# Patient Record
Sex: FEMALE | Race: WHITE | NOT HISPANIC OR LATINO | Employment: UNEMPLOYED | URBAN - METROPOLITAN AREA
[De-identification: names, ages, dates, MRNs, and addresses within clinical notes are randomized per-mention and may not be internally consistent; named-entity substitution may affect disease eponyms.]

---

## 2017-01-29 ENCOUNTER — HOSPITAL ENCOUNTER (EMERGENCY)
Facility: HOSPITAL | Age: 22
Discharge: HOME | End: 2017-01-29
Payer: MEDICAID

## 2017-01-29 VITALS
RESPIRATION RATE: 18 BRPM | DIASTOLIC BLOOD PRESSURE: 74 MMHG | SYSTOLIC BLOOD PRESSURE: 116 MMHG | WEIGHT: 227 LBS | BODY MASS INDEX: 41.77 KG/M2 | TEMPERATURE: 98 F | OXYGEN SATURATION: 100 % | HEIGHT: 62 IN | HEART RATE: 80 BPM

## 2017-01-29 DIAGNOSIS — M06.9 FLARE OF RHEUMATOID ARTHRITIS: Primary | ICD-10-CM

## 2017-01-29 DIAGNOSIS — M25.562 ACUTE PAIN OF LEFT KNEE: ICD-10-CM

## 2017-01-29 PROCEDURE — 96372 THER/PROPH/DIAG INJ SC/IM: CPT

## 2017-01-29 PROCEDURE — 63600175 PHARM REV CODE 636 W HCPCS: Performed by: NURSE PRACTITIONER

## 2017-01-29 PROCEDURE — 99283 EMERGENCY DEPT VISIT LOW MDM: CPT | Mod: 25

## 2017-01-29 RX ORDER — KETOROLAC TROMETHAMINE 30 MG/ML
60 INJECTION, SOLUTION INTRAMUSCULAR; INTRAVENOUS
Status: COMPLETED | OUTPATIENT
Start: 2017-01-29 | End: 2017-01-29

## 2017-01-29 RX ORDER — METHYLPREDNISOLONE 4 MG/1
TABLET ORAL
Qty: 1 PACKAGE | Refills: 0 | Status: SHIPPED | OUTPATIENT
Start: 2017-01-29 | End: 2017-02-19

## 2017-01-29 RX ORDER — DICLOFENAC SODIUM 50 MG/1
50 TABLET, DELAYED RELEASE ORAL 2 TIMES DAILY
Qty: 20 TABLET | Refills: 0 | Status: SHIPPED | OUTPATIENT
Start: 2017-01-29 | End: 2017-07-11 | Stop reason: ALTCHOICE

## 2017-01-29 RX ADMIN — KETOROLAC TROMETHAMINE 60 MG: 60 INJECTION, SOLUTION INTRAMUSCULAR at 04:01

## 2017-01-29 NOTE — ED PROVIDER NOTES
SCRIBE #1 NOTE: I, Noah Meredith, am scribing for, and in the presence of, Marlo Mark NP. I have scribed the entire note.      History      Chief Complaint   Patient presents with    Rheumatoid Arthritis     pt has JRA and hasnt had any relief from medications for 3 weeks        Review of patient's allergies indicates:  No Known Allergies     HPI   HPI    1/29/2017, 4:20 PM   History obtained from the patient      History of Present Illness: Padmini Wren is a 21 y.o. female patient with a PMHx of Rheumatoid Arthritis who presents to the Emergency Department for bilateral knee pain, ongoing for three weeks. The pain is constant and moderate in severity, defined as resembling a usual flare-up. She reports that she does not have primary care at this time to treat her pain. She last received relief, via steroid injection, after presenting to [facility] last week. She has not been icing the site. Patient denies any weakness, numbness, fever, chills, CP, SOB, abdominal pain, N/V/D or any other sx at this time. No further complaints or concerns at this time.     Arrival mode: Personal vehicle    PCP: Primary Doctor No       Past Medical History:  No past medical history on file.    Past Surgical History:  No past surgical history on file.      Family History:  No family history on file.    Social History:  Social History     Social History Main Topics    Smoking status: Not on file    Smokeless tobacco: Not on file    Alcohol use Not on file    Drug use: Not on file    Sexual activity: Not on file       ROS   Review of Systems   Constitutional: Negative for chills and fever.   HENT: Negative for sore throat.    Respiratory: Negative for shortness of breath.    Cardiovascular: Negative for chest pain.   Gastrointestinal: Negative for diarrhea, nausea and vomiting.   Genitourinary: Negative for dysuria.   Musculoskeletal: Positive for arthralgias (knees, bilateral ). Negative for back pain.    Skin: Negative for rash.   Neurological: Negative for weakness and numbness.   Hematological: Does not bruise/bleed easily.       Physical Exam    Initial Vitals   BP Pulse Resp Temp SpO2   01/29/17 1450 01/29/17 1450 01/29/17 1450 01/29/17 1450 01/29/17 1450   117/65 78 16 98.1 °F (36.7 °C) 97 %      Physical Exam  Nursing Notes and Vital Signs Reviewed.  Constitutional: Patient is in no acute distress. Awake and alert. Well-developed and well-nourished.  Head: Atraumatic. Normocephalic.  Eyes: PERRL. EOM intact. Conjunctivae are not pale. No scleral icterus.  ENT: Mucous membranes are moist. Oropharynx is clear and symmetric.    Neck: Supple. Full ROM. No lymphadenopathy.  Cardiovascular: Regular rate. Regular rhythm. No murmurs, rubs, or gallops. Distal pulses are 2+ and symmetric.  Pulmonary/Chest: No respiratory distress. Clear to auscultation bilaterally. No wheezing, rales, or rhonchi.  Abdominal: Soft and non-distended.  There is no tenderness.  No rebound, guarding, or rigidity.  Good bowel sounds.    Genitourinary: No CVA tenderness  Musculoskeletal: Moves all extremities. No obvious deformities. No edema. No calf tenderness.  Right Knee:  No obvious deformity. No swelling or tenderness. No increased warmth, erythema, induration or fluctuance.  No ligament laxity. DP and PT pulses are 2+.  Normal capillary refill.  Distal sensation is intact.  Left Knee:  No obvious deformity. There is mild swelling with tenderness. No increased warmth, erythema, induration or fluctuance. DP and PT pulses are 2+.  Normal capillary refill.  Distal sensation is intact.  Skin: Warm and dry.  Neurological:  Alert, awake, and appropriate.  Normal speech.  No acute focal neurological deficits are appreciated.  Psychiatric: Normal affect. Good eye contact. Appropriate in content.    ED Course    Procedures  ED Vital Signs:  Vitals:    01/29/17 1450   BP: 117/65   Pulse: 78   Resp: 16   Temp: 98.1 °F (36.7 °C)   TempSrc: Oral  "  SpO2: 97%   Weight: 103 kg (227 lb)   Height: 5' 2" (1.575 m)            The Emergency Provider reviewed the vital signs and test results, which are outlined above.    ED Discussion     4:25 PM: Discussed with pt all pertinent ED information and results. Discussed pt dx and plan of tx. Gave pt all f/u and return to the ED instructions. All questions and concerns were addressed at this time. Pt expresses understanding of information and instructions, and is comfortable with plan to discharge. Pt is stable for discharge.    ED Medication(s):  Medications   ketorolac injection 60 mg (not administered)       New Prescriptions    DICLOFENAC (VOLTAREN) 50 MG EC TABLET    Take 1 tablet (50 mg total) by mouth 2 (two) times daily.    METHYLPREDNISOLONE (MEDROL DOSEPACK) 4 MG TABLET    As directed       Follow-up Information     Schedule an appointment as soon as possible for a visit with pcp or ER .            Medical Decision Making              Scribe Attestation:   Scribe #1: I performed the above scribed service and the documentation accurately describes the services I performed. I attest to the accuracy of the note.    Attending:   Physician Attestation Statement for Scribe #1: I, Marlo Mark NP , personally performed the services described in this documentation, as scribed by Noah Meredith, in my presence, and it is both accurate and complete.          Clinical Impression       ICD-10-CM ICD-9-CM   1. Flare of rheumatoid arthritis M06.9 714.0   2. Acute pain of left knee M25.562 719.46       Disposition:   Disposition: Discharged  Condition: Stable       Marlo Mark NP  01/31/17 0206    "

## 2017-01-29 NOTE — ED AVS SNAPSHOT
[facility]  [address] [facility] [address]               Padmini Wren   2017  3:50 PM   ED    Description:  Female : 1995   Department:  [facility]            Your Care was Coordinated By:     Provider Role From To    Marlo Mark NP Nurse Practitioner 17 [id number] --      Reason for Visit     Rheumatoid Arthritis           Diagnoses this Visit        Comments    Flare of rheumatoid arthritis    -  Primary     Acute pain of left knee           ED Disposition     None           To Do List           Follow-up Information     Schedule an appointment as soon as possible for a visit with pcp or ER .       These Medications        Disp Refills Start End    methylPREDNISolone (MEDROL DOSEPACK) 4 mg tablet 1 Package 0 2017    As directed    diclofenac (VOLTAREN) 50 MG EC tablet 20 tablet 0 2017     Take 1 tablet (50 mg total) by mouth 2 (two) times daily. - Oral      [facility]     [facility] -  Assistance  Registered nurses in the [facility] provide clinical advisement, health education, appointment booking, and other advisory services.  Call for this free service at [phone].             Medications           Message regarding Medications     Verify the changes and/or additions to your medication regime listed below are the same as discussed with your clinician today.  If any of these changes or additions are incorrect, please notify your healthcare provider.        START taking these NEW medications        Refills    methylPREDNISolone (MEDROL DOSEPACK) 4 mg tablet 0    Sig: As directed    Class: Print    diclofenac (VOLTAREN) 50 MG EC tablet 0    Sig: Take 1 tablet (50 mg total) by mouth 2 (two) times daily.    Class: Print    Route: Oral      These medications were administered today        Dose Freq    ketorolac injection 60 mg 60 mg ED 1 Time  "   Sig: Inject 2 mLs (60 mg total) into the muscle ED 1 Time.    Class: Normal    Route: Intramuscular    Non-formulary Exception Code: Defer to pharmacy           Verify that the below list of medications is an accurate representation of the medications you are currently taking.  If none reported, the list may be blank. If incorrect, please contact your healthcare provider. Carry this list with you in case of emergency.           Current Medications     diclofenac (VOLTAREN) 50 MG EC tablet Take 1 tablet (50 mg total) by mouth 2 (two) times daily.    ketorolac injection 60 mg Inject 2 mLs (60 mg total) into the muscle ED 1 Time.    methylPREDNISolone (MEDROL DOSEPACK) 4 mg tablet As directed           Clinical Reference Information           Your Vitals Were     BP Pulse Temp Resp Height Weight    117/65 (BP Location: Right arm, Patient Position: Sitting) 78 98.1 °F (36.7 °C) (Oral) 16 5' 2" (1.575 m) 103 kg (227 lb)    SpO2 BMI             97% 41.52 kg/m2         Allergies as of 1/29/2017     No Known Allergies      Immunizations Administered on Date of Encounter - 1/29/2017     None      ED Micro, Lab, POCT     None      ED Imaging Orders     None        Discharge Instructions         Living with Rheumatoid Arthritis  Rheumatoid arthritis (RA) is a chronic disease, but it doesn't have to keep you from being active. It is an autoimmune disease and your immune system attacks the lining of your joints. You can help control RA with exercise and a healthy lifestyle. Be sure to see your healthcare provider for scheduled checkups and lab work. At some point, you may be referred to a rheumatologist (a healthcare provider who specializes in arthritis and related diseases).    Make exercise part of your life  Gentle exercise can help lessen your pain. Keep the following in mind:  · Choose exercises that improve joint motion and make your muscles stronger. Your healthcare provider or a physical therapist may suggest a " few.  · Most people should exercise for at least 30 minutes a day on most days of the week. This can be broken up into shorter periods throughout the day.  · Try walking, riding a bike, or doing exercises in a warm pool. Look for programs in your community for people with arthritis.   · Dont push yourself too hard at first. Slowly build up over time.  · Make sure you warm up for 5 to 10 minutes each time you exercise. Stretching and flexibility exercises are often helpful.   · If pain and stiffness increase, don't exercise as hard or as long.  Watch your weight  If you weigh more than you should, your weight-bearing joints are under extra pressure. This makes your symptoms worse. To reduce pain and stiffness, try shedding a few of those extra pounds. The tips below may help:  · Start a weight-loss program with the help of your healthcare provider.  · Ask your friends and family for support.  · Join a weight-loss group.  Learn ways to cope  Most people with long-term conditions find it a challenge to deal with the emotions that often go along with their conditions. With rheumatoid arthritis, there is also pain.   · Work with your healthcare provider on ways to lessen pain. Medicines, use of heat and cold, and other methods are available.  · Learn to relax. Although it may not be easy, it does help lessen stress, anxiety, and pain. Simple deep-breathing exercises, meditation, and yoga are examples of relaxation techniques.  · Depression is common with long-term conditions. If you feel depressed, make sure you talk with your healthcare provider. Again, treatments, like medicine and counseling, are available.  Try to make your day easier    There are things you can do every day to protect your joints:  · Learn to balance rest with activity. Even on days when you have few symptoms, rest is still important.  · Ask friends and family members for help. Help with simple things can make a big difference for you. For example,  you might ask someone to change a light bulb, or take out your weekly garbage.  · Use assistive devices, which are special tools that reduce strain and protect joints. For example:  ¨ Long-handled reachers or grabbers for reaching high and low  ¨ Jar-openers, two-handled cups, and button --all of these devices help to protect your fingers, hands, and wrists  ¨ Large  for pencils, pens, kitchen and garden tools  The Arthritis Foundation has many additional suggestions about protecting your joints. Go to their website at http://www.arthritis.org.  Use mobility and other aids  People with arthritis and other problems affecting the joints often use mobility aids, to help with walking. For example, they may use canes or walkers. They may also use splints or braces to support joints. Talk with your healthcare provider or therapist about these aids. For instance, you might benefit from:  · Use a cane to ease knee or hip pain and help prevent falls  · Splints for your wrists or other joints  · A brace to support a weak knee joint  © 6595-2203 [employer]. [address] [place] [address]. All rights reserved. This information is not intended as a substitute for professional medical care. Always follow your healthcare professional's instructions.          Rheumatoid Arthritis  You have rheumatoid arthritis (RA). This is a chronic disease that mainly affects the joints. Sometimes, it also affects other parts of the body. RA is an autoimmune disease. This means that the bodys immune system, which normally protects the body, causes harm instead. With RA, the immune system attacks the joints. The reason for this is unknown.  In most cases, RA affects pairs of joints on both sides of the body. These can include joints in both elbows, wrists, hands, knees, feet, or ankles. The disease often starts slowly. Early symptoms include stiffness, muscle aches, weakness, and fatigue. Over time, the  joints may begin to hurt. They may also become warm, swollen, or tender. Symptoms may feel worse in the morning after a nights rest and may get better with activity.  With RA, you may have periods of active disease (when symptoms worsen) followed by periods of remission (when symptoms improve or go away). There is no known cure for RA. But medical treatment can slow or stop the progress of the disease. It can also help relieve symptoms. For advanced disease, surgery, such as joint replacement, may be the best option.  Home care  · If you were prescribed a medication, take it as directed.  · To help control swelling and pain, acetaminophen, ibuprofen, or another NSAID (non-steroidal anti-inflammatory drug) may be recommended. Note: If you have chronic liver or kidney disease or ever had a stomach ulcer or GI bleeding, tell your health care provider before taking any of these medications.  · Some persons find relief with heat (hot shower, hot bath, or heating pad). Others prefer cold (ice in a plastic bag, wrapped in a towel). Try both. Then use the method you like best. Use heat or cold for about 20 minutes, a few times a day.  · Exercise is a key part of treatment for RA. It helps reduce pain. It may also improve flexibility. Do your best to be active daily. Move your joints through their full range of motion each morning. Avoid staying in any one position for long periods of time. Take breaks throughout the day and move around. Also, ask your health care provider or physical therapist what exercises are best for you.  · If you are overweight, lose weight. Extra weight puts stress on your joints.  · If you smoke, quit. Smoking raises the risk of other problems linked to RA.  · No herbal product or nutritional supplement has been proven to help RA. But treatments such as acupuncture and massage may help relieve pain.  · Talk to your health care provider or occupational therapist about easier ways to perform daily  tasks. This may include the use of assistive devices. These are special tools that can help with things like dressing, bathing, cooking, driving, and moving or getting around.  Follow-up care  Follow up with your health care provider, or as advised.   When to seek medical advice  Call your health care provider right away if any of these occur:  · Increasing weakness, pale color of the skin, fainting  · Chest pain or shortness of breath  · Blood in vomit or stool (black or red color)  · Changes in vision  · Skin ulcers  · Fever of 101ºF (38.3ºC) or higher, or as directed by your health care provider  · New joint pain  · New rash  Resources  To learn more about RA, contact:  · Arthritis Foundation, 624.828.2003, www.arthritis.org  · National [place] of Arthritis and Musculoskeletal and Skin Diseases (NIAMS), 121.128.9763, www.niams.nih.gov     © 1277-9790 [employer]. [address] [place] [address], [place] [address]. All rights reserved. This information is not intended as a substitute for professional medical care. Always follow your healthcare professional's instructions.          [facility] Sign-Up     Activating your [facility] account is as easy as 1-2-3!     1) Visit [email or web address], select Sign Up Now, enter this activation code and your date of birth, then select Next.  [email or web address]  Expires: 3/15/2017  4:25 PM      2) Create a username and password to use when you visit [facility] in the future and select a security question in case you lose your password and select Next.    3) Enter your e-mail address and click Sign Up!    Additional Information  If you have questions, please e-mail [email or web address] or call [phone] to talk to our [facility] staff. Remember, [facility] is NOT to be used for urgent needs. For medical emergencies, dial 911.         Smoking Cessation     If you would like to quit smoking:   You may be eligible for free services if you are a [place] resident and started  smoking cigarettes before September 1, 1988.  Call the Smoking Cessation Trust (SCT) toll free at (834) 777-0422 or (445) 723-0651.   Call 1-800-QUIT-NOW if you do not meet the above criteria.             [facility] complies with applicable Federal civil rights laws and does not discriminate on the basis of race, color, national origin, age, disability, or sex.        Language Assistance Services     ATTENTION: Language assistance services are available, free of charge. Please call [phone].      ATENCIÓN: Si habla español, tiene a man disposición servicios gratuitos de asistencia lingüística. Llame al [phone].     CHÚ Ý: N?u b?n nói Ti?ng Vi?t, có các d?ch v? h? tr? ngôn ng? mi?n phí dành cho b?n. G?i s? [phone].

## 2017-01-29 NOTE — DISCHARGE INSTRUCTIONS
Living with Rheumatoid Arthritis  Rheumatoid arthritis (RA) is a chronic disease, but it doesn't have to keep you from being active. It is an autoimmune disease and your immune system attacks the lining of your joints. You can help control RA with exercise and a healthy lifestyle. Be sure to see your healthcare provider for scheduled checkups and lab work. At some point, you may be referred to a rheumatologist (a healthcare provider who specializes in arthritis and related diseases).    Make exercise part of your life  Gentle exercise can help lessen your pain. Keep the following in mind:  · Choose exercises that improve joint motion and make your muscles stronger. Your healthcare provider or a physical therapist may suggest a few.  · Most people should exercise for at least 30 minutes a day on most days of the week. This can be broken up into shorter periods throughout the day.  · Try walking, riding a bike, or doing exercises in a warm pool. Look for programs in your community for people with arthritis.   · Dont push yourself too hard at first. Slowly build up over time.  · Make sure you warm up for 5 to 10 minutes each time you exercise. Stretching and flexibility exercises are often helpful.   · If pain and stiffness increase, don't exercise as hard or as long.  Watch your weight  If you weigh more than you should, your weight-bearing joints are under extra pressure. This makes your symptoms worse. To reduce pain and stiffness, try shedding a few of those extra pounds. The tips below may help:  · Start a weight-loss program with the help of your healthcare provider.  · Ask your friends and family for support.  · Join a weight-loss group.  Learn ways to cope  Most people with long-term conditions find it a challenge to deal with the emotions that often go along with their conditions. With rheumatoid arthritis, there is also pain.   · Work with your healthcare provider on ways to lessen pain. Medicines, use of  heat and cold, and other methods are available.  · Learn to relax. Although it may not be easy, it does help lessen stress, anxiety, and pain. Simple deep-breathing exercises, meditation, and yoga are examples of relaxation techniques.  · Depression is common with long-term conditions. If you feel depressed, make sure you talk with your healthcare provider. Again, treatments, like medicine and counseling, are available.  Try to make your day easier    There are things you can do every day to protect your joints:  · Learn to balance rest with activity. Even on days when you have few symptoms, rest is still important.  · Ask friends and family members for help. Help with simple things can make a big difference for you. For example, you might ask someone to change a light bulb, or take out your weekly garbage.  · Use assistive devices, which are special tools that reduce strain and protect joints. For example:  ¨ Long-handled reachers or grabbers for reaching high and low  ¨ Jar-openers, two-handled cups, and button --all of these devices help to protect your fingers, hands, and wrists  ¨ Large  for pencils, pens, kitchen and garden tools  The Arthritis Foundation has many additional suggestions about protecting your joints. Go to their website at http://www.arthritis.org.  Use mobility and other aids  People with arthritis and other problems affecting the joints often use mobility aids, to help with walking. For example, they may use canes or walkers. They may also use splints or braces to support joints. Talk with your healthcare provider or therapist about these aids. For instance, you might benefit from:  · Use a cane to ease knee or hip pain and help prevent falls  · Splints for your wrists or other joints  · A brace to support a weak knee joint  © 1898-4029 [employer]. [address] [place] [address], [place] [address]. All rights reserved. This information is not intended as a substitute for  professional medical care. Always follow your healthcare professional's instructions.          Rheumatoid Arthritis  You have rheumatoid arthritis (RA). This is a chronic disease that mainly affects the joints. Sometimes, it also affects other parts of the body. RA is an autoimmune disease. This means that the bodys immune system, which normally protects the body, causes harm instead. With RA, the immune system attacks the joints. The reason for this is unknown.  In most cases, RA affects pairs of joints on both sides of the body. These can include joints in both elbows, wrists, hands, knees, feet, or ankles. The disease often starts slowly. Early symptoms include stiffness, muscle aches, weakness, and fatigue. Over time, the joints may begin to hurt. They may also become warm, swollen, or tender. Symptoms may feel worse in the morning after a nights rest and may get better with activity.  With RA, you may have periods of active disease (when symptoms worsen) followed by periods of remission (when symptoms improve or go away). There is no known cure for RA. But medical treatment can slow or stop the progress of the disease. It can also help relieve symptoms. For advanced disease, surgery, such as joint replacement, may be the best option.  Home care  · If you were prescribed a medication, take it as directed.  · To help control swelling and pain, acetaminophen, ibuprofen, or another NSAID (non-steroidal anti-inflammatory drug) may be recommended. Note: If you have chronic liver or kidney disease or ever had a stomach ulcer or GI bleeding, tell your health care provider before taking any of these medications.  · Some persons find relief with heat (hot shower, hot bath, or heating pad). Others prefer cold (ice in a plastic bag, wrapped in a towel). Try both. Then use the method you like best. Use heat or cold for about 20 minutes, a few times a day.  · Exercise is a key part of treatment for RA. It helps reduce  pain. It may also improve flexibility. Do your best to be active daily. Move your joints through their full range of motion each morning. Avoid staying in any one position for long periods of time. Take breaks throughout the day and move around. Also, ask your health care provider or physical therapist what exercises are best for you.  · If you are overweight, lose weight. Extra weight puts stress on your joints.  · If you smoke, quit. Smoking raises the risk of other problems linked to RA.  · No herbal product or nutritional supplement has been proven to help RA. But treatments such as acupuncture and massage may help relieve pain.  · Talk to your health care provider or occupational therapist about easier ways to perform daily tasks. This may include the use of assistive devices. These are special tools that can help with things like dressing, bathing, cooking, driving, and moving or getting around.  Follow-up care  Follow up with your health care provider, or as advised.   When to seek medical advice  Call your health care provider right away if any of these occur:  · Increasing weakness, pale color of the skin, fainting  · Chest pain or shortness of breath  · Blood in vomit or stool (black or red color)  · Changes in vision  · Skin ulcers  · Fever of 101ºF (38.3ºC) or higher, or as directed by your health care provider  · New joint pain  · New rash  Resources  To learn more about RA, contact:  · Arthritis Foundation, 582.200.8960, www.arthritis.org  · National [place] of Arthritis and Musculoskeletal and Skin Diseases (NIAMS), 405.468.1046, www.niams.nih.gov     © 9391-4679 [employer]. [address] [place] [address], [place] [address]. All rights reserved. This information is not intended as a substitute for professional medical care. Always follow your healthcare professional's instructions.

## 2017-02-23 ENCOUNTER — HOSPITAL ENCOUNTER (EMERGENCY)
Facility: HOSPITAL | Age: 22
Discharge: HOME | End: 2017-02-23
Payer: MEDICAID

## 2017-02-23 VITALS
WEIGHT: 220 LBS | TEMPERATURE: 98 F | BODY MASS INDEX: 40.48 KG/M2 | SYSTOLIC BLOOD PRESSURE: 118 MMHG | HEART RATE: 84 BPM | HEIGHT: 62 IN | OXYGEN SATURATION: 99 % | RESPIRATION RATE: 20 BRPM | DIASTOLIC BLOOD PRESSURE: 73 MMHG

## 2017-02-23 DIAGNOSIS — M06.9 RHEUMATOID ARTHRITIS INVOLVING BOTH KNEES, UNSPECIFIED RHEUMATOID FACTOR PRESENCE: Primary | ICD-10-CM

## 2017-02-23 DIAGNOSIS — M06.9 RHEUMATOID ARTHRITIS FLARE: ICD-10-CM

## 2017-02-23 PROCEDURE — 25000003 PHARM REV CODE 250: Performed by: PHYSICIAN ASSISTANT

## 2017-02-23 PROCEDURE — 99283 EMERGENCY DEPT VISIT LOW MDM: CPT | Mod: 25

## 2017-02-23 PROCEDURE — 96372 THER/PROPH/DIAG INJ SC/IM: CPT

## 2017-02-23 PROCEDURE — 63600175 PHARM REV CODE 636 W HCPCS: Performed by: PHYSICIAN ASSISTANT

## 2017-02-23 RX ORDER — HYDROCODONE BITARTRATE AND ACETAMINOPHEN 10; 325 MG/1; MG/1
1 TABLET ORAL EVERY 4 HOURS PRN
Qty: 15 TABLET | Refills: 0 | Status: SHIPPED | OUTPATIENT
Start: 2017-02-23 | End: 2017-07-11 | Stop reason: ALTCHOICE

## 2017-02-23 RX ORDER — DEXAMETHASONE SODIUM PHOSPHATE 4 MG/ML
4 INJECTION, SOLUTION INTRA-ARTICULAR; INTRALESIONAL; INTRAMUSCULAR; INTRAVENOUS; SOFT TISSUE
Status: DISCONTINUED | OUTPATIENT
Start: 2017-02-23 | End: 2017-02-23 | Stop reason: HOSPADM

## 2017-02-23 RX ORDER — HYDROCODONE BITARTRATE AND ACETAMINOPHEN 10; 325 MG/1; MG/1
1 TABLET ORAL
Status: COMPLETED | OUTPATIENT
Start: 2017-02-23 | End: 2017-02-23

## 2017-02-23 RX ORDER — NABUMETONE 500 MG/1
500 TABLET, FILM COATED ORAL 2 TIMES DAILY
Qty: 20 TABLET | Refills: 0 | Status: SHIPPED | OUTPATIENT
Start: 2017-02-23 | End: 2017-07-11 | Stop reason: ALTCHOICE

## 2017-02-23 RX ORDER — DEXAMETHASONE SODIUM PHOSPHATE 4 MG/ML
4 INJECTION, SOLUTION INTRA-ARTICULAR; INTRALESIONAL; INTRAMUSCULAR; INTRAVENOUS; SOFT TISSUE
Status: COMPLETED | OUTPATIENT
Start: 2017-02-23 | End: 2017-02-23

## 2017-02-23 RX ORDER — PREDNISONE 50 MG/1
50 TABLET ORAL DAILY
Qty: 5 TABLET | Refills: 0 | Status: SHIPPED | OUTPATIENT
Start: 2017-02-23 | End: 2017-02-28

## 2017-02-23 RX ADMIN — HYDROCODONE BITARTRATE AND ACETAMINOPHEN 1 TABLET: 10; 325 TABLET ORAL at 05:02

## 2017-02-23 RX ADMIN — DEXAMETHASONE SODIUM PHOSPHATE 4 MG: 4 INJECTION, SOLUTION INTRAMUSCULAR; INTRAVENOUS at 05:02

## 2017-02-23 NOTE — ED PROVIDER NOTES
SCRIBE #1 NOTE: I, Jose Alberto Mehta, am scribing for, and in the presence of, ELROY Carpenter. I have scribed the entire note.      History      Chief Complaint   Patient presents with    Arthritis     pt c/o arthritis flare up to bilat ankles, knees and hips for a while       Review of patient's allergies indicates:  No Known Allergies     HPI   HPI    2/23/2017, 5:11 PM   History obtained from the patient      History of Present Illness: Padmini Wren is a 21 y.o. female patient, with PMHx of rheumatoid arthritis, who presents to the Emergency Department for bilateral hip pain which onset gradually 1 week ago. Symptoms are constant and moderate in severity. Pt has been unable to get the proper medications due to an insurance problem. No mitigating or exacerbating factors reported. Associated sxs include gait problem, bilateral knee pain, bilateral ankle pain. Patient denies any back pain, neck pain, numbness, weakness, HA, lightheadedness, dizziness, n/v/d, and all other sxs at this time. No further complaints or concerns at this time.         Arrival mode: Personal vehicle     PCP: Taryn Salcido MD       Past Medical History:  Past medical history reviewed not relevant      Past Surgical History:  Past surgical history reviewed not relevant      Family History:  Family history reviewed not relevant      Social History:  Social History    Social History Main Topics    Social History Main Topics    Smoking status: Unknown if ever smoked    Smokeless tobacco: Unknown if ever used    Alcohol Use: Unknown drinking history    Drug Use: Unknown if ever used    Sexual Activity: Unknown         ROS   Review of Systems   Constitutional: Negative for fever.   HENT: Negative for sore throat.    Respiratory: Negative for shortness of breath.    Cardiovascular: Negative for chest pain.   Gastrointestinal: Negative for diarrhea, nausea and vomiting.   Genitourinary: Negative for dysuria.  "  Musculoskeletal: Positive for arthralgias (bilateral hip, knees, and ankles) and gait problem. Negative for back pain and neck pain.   Skin: Negative for rash.   Neurological: Negative for dizziness, weakness, light-headedness, numbness and headaches.   Hematological: Does not bruise/bleed easily.       Physical Exam    Initial Vitals   BP Pulse Resp Temp SpO2   02/23/17 1630 02/23/17 1630 02/23/17 1630 02/23/17 1630 02/23/17 1630   118/73 84 20 97.9 °F (36.6 °C) 99 %      Physical Exam  Nursing Notes and Vital Signs Reviewed.  Constitutional: Patient is in no acute distress. Awake and alert. Well-developed and well-nourished.  Head: Atraumatic. Normocephalic.  Eyes: PERRL. EOM intact. Conjunctivae are not pale. No scleral icterus.  ENT: Mucous membranes are moist. Oropharynx is clear and symmetric.    Neck: Supple. Full ROM. No lymphadenopathy.  Cardiovascular: Regular rate. Regular rhythm. No murmurs, rubs, or gallops. Distal pulses are 2+ and symmetric.  Pulmonary/Chest: No respiratory distress. Clear to auscultation bilaterally. No wheezing, rales, or rhonchi.  Abdominal: Soft and non-distended.  There is no tenderness.  No rebound, guarding, or rigidity.  Good bowel sounds.    Musculoskeletal: Moves all extremities. No obvious deformities. No edema. No calf tenderness.  Skin: Warm and dry.  Neurological:  Alert, awake, and appropriate.  Normal speech.  No acute focal neurological deficits are appreciated.  Psychiatric: Normal affect. Good eye contact. Appropriate in content.    ED Course    Procedures  ED Vital Signs:  Vitals:    02/23/17 1630   BP: 118/73   Pulse: 84   Resp: 20   Temp: 97.9 °F (36.6 °C)   TempSrc: Oral   SpO2: 99%   Weight: 99.8 kg (220 lb)   Height: 5' 2" (1.575 m)              The Emergency Provider reviewed the vital signs and test results, which are outlined above.    ED Discussion     5:26 PM: ELROY Roth explained to the pt the need to see a rheumatologist and the limitations " of the ED.    5:35 PM: Discussed pt dx and plan of tx. Gave pt all f/u and return to the ED instructions. All questions and concerns were addressed at this time. Pt expresses understanding of information and instructions, and is comfortable with plan to discharge. Pt is stable for discharge.        ED Medication(s):  Medications   dexamethasone injection 4 mg (not administered)   dexamethasone injection 4 mg (4 mg Intramuscular Given 2/23/17 1743)   hydrocodone-acetaminophen 10-325mg per tablet 1 tablet (1 tablet Oral Given 2/23/17 1742)       Discharge Medication List as of 2/23/2017  5:34 PM      START taking these medications    Details   hydrocodone-acetaminophen 10-325mg (NORCO)  mg Tab Take 1 tablet by mouth every 4 (four) hours as needed., Starting 2/23/2017, Until Discontinued, Print      nabumetone (RELAFEN) 500 MG tablet Take 1 tablet (500 mg total) by mouth 2 (two) times daily., Starting 2/23/2017, Until Discontinued, Print      predniSONE (DELTASONE) 50 MG Tab Take 1 tablet (50 mg total) by mouth once daily., Starting 2/23/2017, Until Tue 2/28/17, Print             Follow-up Information     Follow up with Taryn Salcido MD In 2 days.    Specialty:  Family Medicine    Why:  As needed, If symptoms worsen return to ED     Contact information:    [facility] [facility] [address]  [phone]              Medical Decision Making              Scribe Attestation:   Scribe #1: I performed the above scribed service and the documentation accurately describes the services I performed. I attest to the accuracy of the note.    Attending:   Physician Attestation Statement for Scribe #1: I, ELROY Carpenter, personally performed the services described in this documentation, as scribed by Jose Alberto Mehta, in my presence, and it is both accurate and complete.          Clinical Impression       ICD-10-CM ICD-9-CM   1. Rheumatoid arthritis involving both knees, unspecified rheumatoid factor presence M06.9  714.0   2. Rheumatoid arthritis flare M06.9 714.0       Disposition:   Disposition: Discharged  Condition: Stable           ELROY Baker  02/23/17 1815       ELROY Baker  02/23/17 1816

## 2017-02-23 NOTE — DISCHARGE INSTRUCTIONS
Living with Rheumatoid Arthritis    Rheumatoid arthritis (RA) is a chronic disease, but it doesn't have to keep you from being active. It is an autoimmune disease and your immune system attacks the lining of your joints. You can help control RA with exercise and a healthy lifestyle. Be sure to see your healthcare provider for scheduled checkups and lab work. At some point, you may be referred to a rheumatologist (a healthcare provider who specializes in arthritis and related diseases).  Make exercise part of your life  Gentle exercise can help lessen your pain. Keep the following in mind:  · Choose exercises that improve joint motion and make your muscles stronger. Your healthcare provider or a physical therapist may suggest a few.  · Most people should exercise for at least 30 minutes a day on most days of the week. This can be broken up into shorter periods throughout the day.  · Try walking, riding a bike, or doing exercises in a warm pool. Look for programs in your community for people with arthritis.   · Dont push yourself too hard at first. Slowly build up over time.  · Make sure you warm up for 5 to 10 minutes each time you exercise. Stretching and flexibility exercises are often helpful.   · If pain and stiffness increase, don't exercise as hard or as long.  Watch your weight  If you weigh more than you should, your weight-bearing joints are under extra pressure. This makes your symptoms worse. To reduce pain and stiffness, try shedding a few of those extra pounds. The tips below may help:  · Start a weight-loss program with the help of your healthcare provider.  · Ask your friends and family for support.  · Join a weight-loss group.  Learn ways to cope  Most people with long-term conditions find it a challenge to deal with the emotions that often go along with their conditions. With rheumatoid arthritis, there is also pain.   · Work with your healthcare provider on ways to lessen pain. Medicines, use of  heat and cold, and other methods are available.  · Learn to relax. Although it may not be easy, it does help lessen stress, anxiety, and pain. Simple deep-breathing exercises, meditation, and yoga are examples of relaxation techniques.  · Depression is common with long-term conditions. If you feel depressed, make sure you talk with your healthcare provider. Again, treatments, like medicine and counseling, are available.  Try to make your day easier  There are things you can do every day to protect your joints:  · Learn to balance rest with activity. Even on days when you have few symptoms, rest is still important.  · Ask friends and family members for help. Help with simple things can make a big difference for you. For example, you might ask someone to change a light bulb, or take out your weekly garbage.  · Use assistive devices, which are special tools that reduce strain and protect joints. For example:  ¨ Long-handled reachers or grabbers for reaching high and low  ¨ Jar-openers, two-handled cups, and button --all of these devices help to protect your fingers, hands, and wrists  ¨ Large  for pencils, pens, kitchen and garden tools  The Arthritis Foundation has many additional suggestions about protecting your joints. Go to their website at http://www.arthritis.org.  Use mobility and other aids  People with arthritis and other problems affecting the joints often use mobility aids, to help with walking. For example, they may use canes or walkers. They may also use splints or braces to support joints. Talk with your healthcare provider or therapist about these aids. For instance, you might benefit from:  · Use a cane to ease knee or hip pain and help prevent falls  · Splints for your wrists or other joints  · A brace to support a weak knee joint  Date Last Reviewed: 2/14/2016  © 6247-8875 [employer]. [address] [place] [address], [place] [address]. All rights reserved. This information is not  intended as a substitute for professional medical care. Always follow your healthcare professional's instructions.

## 2017-02-23 NOTE — ED AVS SNAPSHOT
[facility] -   [address] [address]  [place] [address]               Padmini Wren   2017  5:01 PM   ED    Description:  Female : 1995   Department:  [facility] -            Your Care was Coordinated By:     Provider Role From To    ELROY Baker Physician Assistant 17 1700 --      Reason for Visit     Arthritis           Diagnoses this Visit        Comments    Rheumatoid arthritis involving both knees, unspecified rheumatoid factor presence    -  Primary     Rheumatoid arthritis flare           ED Disposition     None           To Do List           Follow-up Information     Follow up with Taryn Salcido MD In 2 days.    Specialty:  Family Medicine    Why:  As needed, If symptoms worsen return to ED     Contact information:    [address] [facility] [address]  [phone]         These Medications        Disp Refills Start End    hydrocodone-acetaminophen 10-325mg (NORCO)  mg Tab 15 tablet 0 2017     Take 1 tablet by mouth every 4 (four) hours as needed. - Oral    predniSONE (DELTASONE) 50 MG Tab 5 tablet 0 2017    Take 1 tablet (50 mg total) by mouth once daily. - Oral    nabumetone (RELAFEN) 500 MG tablet 20 tablet 0 2017     Take 1 tablet (500 mg total) by mouth 2 (two) times daily. - Oral      [facility]     [facility] -  Assistance  Registered nurses in the [facility] provide clinical advisement, health education, appointment booking, and other advisory services.  Call for this free service at [phone].             Medications           Message regarding Medications     Verify the changes and/or additions to your medication regime listed below are the same as discussed with your clinician today.  If any of these changes or additions are incorrect, please notify your healthcare provider.        START taking these NEW  medications        Refills    hydrocodone-acetaminophen 10-325mg (NORCO)  mg Tab 0    Sig: Take 1 tablet by mouth every 4 (four) hours as needed.    Class: Print    Route: Oral    predniSONE (DELTASONE) 50 MG Tab 0    Sig: Take 1 tablet (50 mg total) by mouth once daily.    Class: Print    Route: Oral    nabumetone (RELAFEN) 500 MG tablet 0    Sig: Take 1 tablet (500 mg total) by mouth 2 (two) times daily.    Class: Print    Route: Oral      These medications were administered today        Dose Freq    dexamethasone injection 4 mg 4 mg ED 1 Time    Sig: Inject 1 mL (4 mg total) into the muscle ED 1 Time.    Class: Normal    Route: Intramuscular    Cosign for Ordering: Accepted by Nikolas Cortez MD on 2/23/2017  5:30 PM    hydrocodone-acetaminophen 10-325mg per tablet 1 tablet 1 tablet ED 1 Time    Sig: Take 1 tablet by mouth ED 1 Time.    Class: Normal    Route: Oral    Cosign for Ordering: Accepted by Nikolas Cortez MD on 2/23/2017  5:32 PM           Verify that the below list of medications is an accurate representation of the medications you are currently taking.  If none reported, the list may be blank. If incorrect, please contact your healthcare provider. Carry this list with you in case of emergency.           Current Medications     dexamethasone injection 4 mg Inject 1 mL (4 mg total) into the muscle ED 1 Time.    diclofenac (VOLTAREN) 50 MG EC tablet Take 1 tablet (50 mg total) by mouth 2 (two) times daily.    hydrocodone-acetaminophen 10-325mg (NORCO)  mg Tab Take 1 tablet by mouth every 4 (four) hours as needed.    hydrocodone-acetaminophen 10-325mg per tablet 1 tablet Take 1 tablet by mouth ED 1 Time.    nabumetone (RELAFEN) 500 MG tablet Take 1 tablet (500 mg total) by mouth 2 (two) times daily.    predniSONE (DELTASONE) 50 MG Tab Take 1 tablet (50 mg total) by mouth once daily.           Clinical Reference Information           Your Vitals Were     BP Pulse Temp Resp  "Height Weight    118/73 (BP Location: Right arm, Patient Position: Sitting) 84 97.9 °F (36.6 °C) (Oral) 20 5' 2" (1.575 m) 99.8 kg (220 lb)    SpO2 BMI             99% 40.24 kg/m2         Allergies as of 2/23/2017     No Known Allergies      Immunizations Administered on Date of Encounter - 2/23/2017     None      ED Micro, Lab, POCT     None      ED Imaging Orders     None        Discharge Instructions         Living with Rheumatoid Arthritis    Rheumatoid arthritis (RA) is a chronic disease, but it doesn't have to keep you from being active. It is an autoimmune disease and your immune system attacks the lining of your joints. You can help control RA with exercise and a healthy lifestyle. Be sure to see your healthcare provider for scheduled checkups and lab work. At some point, you may be referred to a rheumatologist (a healthcare provider who specializes in arthritis and related diseases).  Make exercise part of your life  Gentle exercise can help lessen your pain. Keep the following in mind:  · Choose exercises that improve joint motion and make your muscles stronger. Your healthcare provider or a physical therapist may suggest a few.  · Most people should exercise for at least 30 minutes a day on most days of the week. This can be broken up into shorter periods throughout the day.  · Try walking, riding a bike, or doing exercises in a warm pool. Look for programs in your community for people with arthritis.   · Dont push yourself too hard at first. Slowly build up over time.  · Make sure you warm up for 5 to 10 minutes each time you exercise. Stretching and flexibility exercises are often helpful.   · If pain and stiffness increase, don't exercise as hard or as long.  Watch your weight  If you weigh more than you should, your weight-bearing joints are under extra pressure. This makes your symptoms worse. To reduce pain and stiffness, try shedding a few of those extra pounds. The tips below may help:  · Start a " weight-loss program with the help of your healthcare provider.  · Ask your friends and family for support.  · Join a weight-loss group.  Learn ways to cope  Most people with long-term conditions find it a challenge to deal with the emotions that often go along with their conditions. With rheumatoid arthritis, there is also pain.   · Work with your healthcare provider on ways to lessen pain. Medicines, use of heat and cold, and other methods are available.  · Learn to relax. Although it may not be easy, it does help lessen stress, anxiety, and pain. Simple deep-breathing exercises, meditation, and yoga are examples of relaxation techniques.  · Depression is common with long-term conditions. If you feel depressed, make sure you talk with your healthcare provider. Again, treatments, like medicine and counseling, are available.  Try to make your day easier  There are things you can do every day to protect your joints:  · Learn to balance rest with activity. Even on days when you have few symptoms, rest is still important.  · Ask friends and family members for help. Help with simple things can make a big difference for you. For example, you might ask someone to change a light bulb, or take out your weekly garbage.  · Use assistive devices, which are special tools that reduce strain and protect joints. For example:  ¨ Long-handled reachers or grabbers for reaching high and low  ¨ Jar-openers, two-handled cups, and button --all of these devices help to protect your fingers, hands, and wrists  ¨ Large  for pencils, pens, kitchen and garden tools  The Arthritis Foundation has many additional suggestions about protecting your joints. Go to their website at http://www.arthritis.org.  Use mobility and other aids  People with arthritis and other problems affecting the joints often use mobility aids, to help with walking. For example, they may use canes or walkers. They may also use splints or braces to support  joints. Talk with your healthcare provider or therapist about these aids. For instance, you might benefit from:  · Use a cane to ease knee or hip pain and help prevent falls  · Splints for your wrists or other joints  · A brace to support a weak knee joint  Date Last Reviewed: 2/14/2016  © 6577-9447 [employer]. [address] [place] [address], [place] [address]. All rights reserved. This information is not intended as a substitute for professional medical care. Always follow your healthcare professional's instructions.          [facility] Sign-Up     Activating your [facility] account is as easy as 1-2-3!     1) Visit [email or web address], select Sign Up Now, enter this activation code and your date of birth, then select Next.  [email or web address]  Expires: 3/15/2017  4:25 PM      2) Create a username and password to use when you visit [facility] in the future and select a security question in case you lose your password and select Next.    3) Enter your e-mail address and click Sign Up!    Additional Information  If you have questions, please e-mail [email or web address] or call [phone] to talk to our [facility] staff. Remember, [facility] is NOT to be used for urgent needs. For medical emergencies, dial 911.         Smoking Cessation     If you would like to quit smoking:   You may be eligible for free services if you are a [place] resident and started smoking cigarettes before September 1, 1988.  Call the Smoking Cessation Trust (SCT) toll free at (796) 587-3317 or (937) 731-7359.   Call 5-997-QUIT-NOW if you do not meet the above criteria.             [facility] complies with applicable Federal civil rights laws and does not discriminate on the basis of race, color, national origin, age, disability, or sex.        Language Assistance Services     ATTENTION: Language assistance services are available, free of charge. Please call [phone].      ATENCIÓN: margret Denise  disposición servicios gratuitos de asistencia lingüística. Llluis enrique al [phone].     BRAXTON Ý: N?u b?n nói Ti?ng Vi?t, có các d?ch v? h? tr? ngôn ng? mi?n phí dành cho b?n. G?i s? [phone].

## 2017-03-02 ENCOUNTER — TELEPHONE (OUTPATIENT)
Dept: RHEUMATOLOGY | Facility: CLINIC | Age: 22
End: 2017-03-02

## 2017-03-02 ENCOUNTER — OFFICE VISIT (OUTPATIENT)
Dept: RHEUMATOLOGY | Facility: CLINIC | Age: 22
End: 2017-03-02
Payer: MEDICAID

## 2017-03-02 ENCOUNTER — LAB VISIT (OUTPATIENT)
Dept: LAB | Facility: HOSPITAL | Age: 22
End: 2017-03-02
Attending: INTERNAL MEDICINE
Payer: MEDICAID

## 2017-03-02 VITALS
WEIGHT: 232.81 LBS | HEART RATE: 106 BPM | SYSTOLIC BLOOD PRESSURE: 107 MMHG | BODY MASS INDEX: 42.58 KG/M2 | DIASTOLIC BLOOD PRESSURE: 73 MMHG

## 2017-03-02 DIAGNOSIS — M08.80 JIA (JUVENILE IDIOPATHIC ARTHRITIS): ICD-10-CM

## 2017-03-02 DIAGNOSIS — M08.80 JIA (JUVENILE IDIOPATHIC ARTHRITIS): Primary | ICD-10-CM

## 2017-03-02 DIAGNOSIS — M25.462 KNEE EFFUSION, LEFT: Chronic | ICD-10-CM

## 2017-03-02 LAB
ALBUMIN SERPL BCP-MCNC: 3 G/DL
ALP SERPL-CCNC: 69 U/L
ALT SERPL W/O P-5'-P-CCNC: 15 U/L
ANION GAP SERPL CALC-SCNC: 7 MMOL/L
APPEARANCE FLD: NORMAL
AST SERPL-CCNC: 12 U/L
BASOPHILS # BLD AUTO: 0.02 K/UL
BASOPHILS NFR BLD: 0.1 %
BILIRUB SERPL-MCNC: 0.2 MG/DL
BODY FLD TYPE: NORMAL
BUN SERPL-MCNC: 14 MG/DL
CALCIUM SERPL-MCNC: 8.8 MG/DL
CHLORIDE SERPL-SCNC: 106 MMOL/L
CO2 SERPL-SCNC: 28 MMOL/L
COLOR FLD: YELLOW
CREAT SERPL-MCNC: 0.8 MG/DL
CRP SERPL-MCNC: 22.4 MG/L
DIFFERENTIAL METHOD: ABNORMAL
EOSINOPHIL # BLD AUTO: 0.1 K/UL
EOSINOPHIL NFR BLD: 0.7 %
ERYTHROCYTE [DISTWIDTH] IN BLOOD BY AUTOMATED COUNT: 14 %
ERYTHROCYTE [SEDIMENTATION RATE] IN BLOOD BY WESTERGREN METHOD: 13 MM/HR
EST. GFR  (AFRICAN AMERICAN): >60 ML/MIN/1.73 M^2
EST. GFR  (NON AFRICAN AMERICAN): >60 ML/MIN/1.73 M^2
GLUCOSE SERPL-MCNC: 97 MG/DL
HCT VFR BLD AUTO: 42.6 %
HGB BLD-MCNC: 13.5 G/DL
LYMPHOCYTES # BLD AUTO: 5.8 K/UL
LYMPHOCYTES NFR BLD: 36 %
LYMPHOCYTES NFR FLD MANUAL: 17 %
MCH RBC QN AUTO: 29 PG
MCHC RBC AUTO-ENTMCNC: 31.7 %
MCV RBC AUTO: 92 FL
MONOCYTES # BLD AUTO: 0.8 K/UL
MONOCYTES NFR BLD: 5.2 %
MONOS+MACROS NFR FLD MANUAL: 7 %
NEUTROPHILS # BLD AUTO: 9.4 K/UL
NEUTROPHILS NFR BLD: 58 %
NEUTROPHILS NFR FLD MANUAL: 76 %
PLATELET # BLD AUTO: 333 K/UL
PMV BLD AUTO: 9.6 FL
POTASSIUM SERPL-SCNC: 4 MMOL/L
PROT SERPL-MCNC: 7 G/DL
RBC # BLD AUTO: 4.65 M/UL
SODIUM SERPL-SCNC: 141 MMOL/L
WBC # BLD AUTO: 16.21 K/UL
WBC # FLD: NORMAL /CU MM

## 2017-03-02 PROCEDURE — 20610 DRAIN/INJ JOINT/BURSA W/O US: CPT | Mod: PBBFAC,PO | Performed by: PHYSICIAN ASSISTANT

## 2017-03-02 PROCEDURE — 99213 OFFICE O/P EST LOW 20 MIN: CPT | Mod: PBBFAC,PO | Performed by: PHYSICIAN ASSISTANT

## 2017-03-02 PROCEDURE — 20610 DRAIN/INJ JOINT/BURSA W/O US: CPT | Mod: S$PBB,,, | Performed by: PHYSICIAN ASSISTANT

## 2017-03-02 PROCEDURE — 99999 PR PBB SHADOW E&M-EST. PATIENT-LVL III: CPT | Mod: PBBFAC,,, | Performed by: PHYSICIAN ASSISTANT

## 2017-03-02 PROCEDURE — 99214 OFFICE O/P EST MOD 30 MIN: CPT | Mod: 25,S$PBB,, | Performed by: PHYSICIAN ASSISTANT

## 2017-03-02 PROCEDURE — 89051 BODY FLUID CELL COUNT: CPT

## 2017-03-02 RX ORDER — BETAMETHASONE SODIUM PHOSPHATE AND BETAMETHASONE ACETATE 3; 3 MG/ML; MG/ML
3 INJECTION, SUSPENSION INTRA-ARTICULAR; INTRALESIONAL; INTRAMUSCULAR; SOFT TISSUE
Status: COMPLETED | OUTPATIENT
Start: 2017-03-02 | End: 2017-03-03

## 2017-03-02 RX ORDER — METHYLPREDNISOLONE ACETATE 80 MG/ML
80 INJECTION, SUSPENSION INTRA-ARTICULAR; INTRALESIONAL; INTRAMUSCULAR; SOFT TISSUE
Status: COMPLETED | OUTPATIENT
Start: 2017-03-02 | End: 2017-03-03

## 2017-03-02 RX ORDER — FOLIC ACID 1 MG/1
1 TABLET ORAL DAILY
Qty: 90 TABLET | Refills: 2 | Status: SHIPPED | OUTPATIENT
Start: 2017-03-02 | End: 2017-07-11 | Stop reason: ALTCHOICE

## 2017-03-02 RX ORDER — METHOTREXATE 2.5 MG/1
TABLET ORAL
Qty: 30 TABLET | Refills: 11 | Status: SHIPPED | OUTPATIENT
Start: 2017-03-02 | End: 2017-07-11 | Stop reason: ALTCHOICE

## 2017-03-02 ASSESSMENT — ROUTINE ASSESSMENT OF PATIENT INDEX DATA (RAPID3): MDHAQ FUNCTION SCORE: 1.7

## 2017-03-02 NOTE — MR AVS SNAPSHOT
[facility]  [address] [facility] [address] [facility] [address] [facility] [place] [address]  Phone: [phone]  Fax: [phone]                  Padmini Jones   3/2/2017 2:00 PM   Office Visit    Description:  Female : 1995   Provider:  Daphne Armstrong PA-C   Department:  [facility] - Rheumatology           Reason for Visit     Rheumatoid Arthritis           Diagnoses this Visit        Comments    FELIBERTO (juvenile idiopathic arthritis)    -  Primary     Knee effusion, left         BMI 40.0-44.9, adult                To Do List           Future Appointments        Provider Department Dept Phone    3/2/2017 3:35 PM LABORATORY, [facility] [phone]    7/10/2017 9:00 AM Daphne Armstrong PA-C [facility] [phone]    7/10/2017 10:00 AM LABORATORY, [facility] [phone]      Goals (5 Years of Data)     None      Follow-Up and Disposition     Return in about 4 months (around 2017) for reg 4 labs.       These Medications        Disp Refills Start End    methotrexate 2.5 MG Tab 30 tablet 11 3/2/2017     Take 4 (10mg) tablets once a week for two weeks, then increase to 6 (15mg) tablets once a week    Pharmacy: [facility] [address] [place] [address] [address] [facility] [address] Ph #: [phone]       folic acid (FOLVITE) 1 MG tablet 90 tablet 2 3/2/2017 3/2/2018    Take 1 tablet (1 mg total) by mouth once daily. - Oral    Pharmacy: [facility] [address] [facility] [address] AT [facility] OF [employer] [email or web address] Ph #: [phone]         [facility]     [facility] Care [facility] -  Assistance  Registered nurses in the [facility] provide clinical advisement, health education, appointment booking, and other advisory services.  Call for this free service at [phone].             Medications           Message regarding Medications     Verify the changes and/or additions to your  medication regime listed below are the same as discussed with your clinician today.  If any of these changes or additions are incorrect, please notify your healthcare provider.        START taking these NEW medications        Refills    methotrexate 2.5 MG Tab 11    Sig: Take 4 (10mg) tablets once a week for two weeks, then increase to 6 (15mg) tablets once a week    Class: Normal    folic acid (FOLVITE) 1 MG tablet 2    Sig: Take 1 tablet (1 mg total) by mouth once daily.    Class: Normal    Route: Oral      These medications were administered today        Dose Freq    betamethasone acetate-betamethasone sodium phosphate injection 3 mg 3 mg Clinic/HOD 1 time    Sig: Inject 0.5 mLs (3 mg total) into the articular space one time.    Class: Normal    Route: Intra-articular    methylPREDNISolone acetate injection 80 mg 80 mg Clinic/HOD 1 time    Sig: Inject 1 mL (80 mg total) into the articular space one time.    Class: Normal    Route: Intra-articular           Verify that the below list of medications is an accurate representation of the medications you are currently taking.  If none reported, the list may be blank. If incorrect, please contact your healthcare provider. Carry this list with you in case of emergency.           Current Medications     etonogestrel (NEXPLANON) 68 mg Impl by Subdermal route.    naproxen sodium (ALEVE) 220 mg Cap Take by mouth. 2 pills twice daily    folic acid (FOLVITE) 1 MG tablet Take 1 tablet (1 mg total) by mouth once daily.    methotrexate 2.5 MG Tab Take 4 (10mg) tablets once a week for two weeks, then increase to 6 (15mg) tablets once a week           Clinical Reference Information           Your Vitals Were     BP                   107/73           Blood Pressure          Most Recent Value    BP  107/73      Allergies as of 3/2/2017     No Known Allergies      Immunizations Administered on Date of Encounter - 3/2/2017     None      Orders Placed During Today's Visit      Normal  Orders This Visit    WBC & Diff,Body Fluid Joint Fluid, Left Knee     Future Labs/Procedures Expected by Expires    KENDRICK  3/2/2017 5/1/2018    Cyclic citrul peptide antibody, IgG  3/2/2017 5/1/2018    Hepatitis B surface antigen  3/2/2017 5/1/2018    Hepatitis C antibody  3/2/2017 5/1/2018    Rheumatoid factor  3/2/2017 5/1/2018    WBC & Diff,Body Fluid Joint Fluid, Left Knee  3/2/2017 5/1/2018      [facility] Sign-Up     Activating your [facility] account is as easy as 1-2-3!     1) Visit [email or web address], select Sign Up Now, enter this activation code and your date of birth, then select Next.  [email or web address]  Expires: 4/16/2017  3:29 PM      2) Create a username and password to use when you visit [facility] in the future and select a security question in case you lose your password and select Next.    3) Enter your e-mail address and click Sign Up!    Additional Information  If you have questions, please e-mail [email or web address] or call [phone] to talk to our [facility] staff. Remember, [facility] is NOT to be used for urgent needs. For medical emergencies, dial 911.         Instructions    Birth control!!!!    Repeat some labs today    Will start methotrexate.     MTX 2.5mg  -start 4 tablets once a week x 2 weeks (10mg)  -then increase to 6 tablets once a week x 2wks (15mg)  -folic acid daily 1 mg    Skip mtx dose for infections or surgery, call office with any questions               Language Assistance Services     ATTENTION: Language assistance services are available, free of charge. Please call [phone].      ATENCIÓN: Si habla diaz, tiene a man disposición servicios gratuitos de asistencia lingüística. Llame al [phone].     CHÚ Ý: N?u b?n nói Ti?ng Vi?t, có các d?ch v? h? tr? ngôn ng? mi?n phí dành cho b?n. G?i s? [phone].         [facility] complies with applicable Federal civil rights laws and does not discriminate on the basis of race, color, national origin, age,  disability, or sex.

## 2017-03-02 NOTE — PROGRESS NOTES
Subjective:       Patient ID: Padmini Jones is a 21 y.o. female.    Chief Complaint: Rheumatoid Arthritis    HPI Comments: Padmini is a 20y/o female with a history of juvenile idiopathic arthritis.  She has not been since since April of 2014 and saw Franchesca at that time, and prior to that time it sounds like she hadn't been seen in a few years.  She states in the past she has been on Enbrel which helped her joints; she is unsure of whether she has taken methotrexate in the past but it sounds familiar to her.  She has not been on any medications for a very long time and is having issues with her joints.  She c/o gregorio knee pain and swelling, left hip pain, and left ankle pain.  She has been going to the ER to get steroid shots for her pain but these have stopped working.  She complains of 7/10 pain today.  She denies pain in her wrists or hands.  No real morning stiffness.  She denies fever, fatigue, rash, hair loss, weight loss, chest pain, SOB, and hematuria.  She denies any symptoms of Raynauds.     Review of Systems   Constitutional: Negative for chills, fatigue and fever.   HENT: Negative for mouth sores, rhinorrhea and sore throat.    Eyes: Negative for pain and redness.   Respiratory: Negative for cough and shortness of breath.    Cardiovascular: Negative for chest pain.   Gastrointestinal: Negative for abdominal pain, constipation, diarrhea, nausea and vomiting.        Reflux   Genitourinary: Negative for dysuria and hematuria.   Musculoskeletal: Positive for arthralgias and joint swelling. Negative for myalgias.   Skin: Negative for color change and rash.   Neurological: Negative for weakness, numbness and headaches.   Psychiatric/Behavioral: The patient is not nervous/anxious.          Objective:   /73  Pulse 106  Wt 105.6 kg (232 lb 12.9 oz)  BMI 42.58 kg/m2     Physical Exam       Right Side Rheumatological Exam     She has swelling of the knee    Left Side Rheumatological Exam     The  patient is tender to palpation of the knee.    She has swelling of the knee      Musculoskeletal:   gregorio wrists, mcps, pips no synovitis, no tenderness, full rom  gregorio elbows and shoulder no swelling, no tenderness, full rom    gregorio hips no pain with int/ext rotation    Right knee small effusion, nontender, good rom  Left knee moderate effusion, +warmth, no erythema, decreased rom due to pain    gregorio ankles no swelling or tenderness, scar the the medial right ankle from old surgery         Recent Results (from the past 168 hour(s))   CBC auto differential    Collection Time: 03/02/17  2:21 PM   Result Value Ref Range    WBC 16.21 (H) 3.90 - 12.70 K/uL    RBC 4.65 4.00 - 5.40 M/uL    Hemoglobin 13.5 12.0 - 16.0 g/dL    Hematocrit 42.6 37.0 - 48.5 %    MCV 92 82 - 98 fL    MCH 29.0 27.0 - 31.0 pg    MCHC 31.7 (L) 32.0 - 36.0 %    RDW 14.0 11.5 - 14.5 %    Platelets 333 150 - 350 K/uL    MPV 9.6 9.2 - 12.9 fL    Gran # 9.4 (H) 1.8 - 7.7 K/uL    Lymph # 5.8 (H) 1.0 - 4.8 K/uL    Mono # 0.8 0.3 - 1.0 K/uL    Eos # 0.1 0.0 - 0.5 K/uL    Baso # 0.02 0.00 - 0.20 K/uL    Gran% 58.0 38.0 - 73.0 %    Lymph% 36.0 18.0 - 48.0 %    Mono% 5.2 4.0 - 15.0 %    Eosinophil% 0.7 0.0 - 8.0 %    Basophil% 0.1 0.0 - 1.9 %    Differential Method Automated    Comprehensive metabolic panel    Collection Time: 03/02/17  2:21 PM   Result Value Ref Range    Sodium 141 136 - 145 mmol/L    Potassium 4.0 3.5 - 5.1 mmol/L    Chloride 106 95 - 110 mmol/L    CO2 28 23 - 29 mmol/L    Glucose 97 70 - 110 mg/dL    BUN, Bld 14 6 - 20 mg/dL    Creatinine 0.8 0.5 - 1.4 mg/dL    Calcium 8.8 8.7 - 10.5 mg/dL    Total Protein 7.0 6.0 - 8.4 g/dL    Albumin 3.0 (L) 3.5 - 5.2 g/dL    Total Bilirubin 0.2 0.1 - 1.0 mg/dL    Alkaline Phosphatase 69 55 - 135 U/L    AST 12 10 - 40 U/L    ALT 15 10 - 44 U/L    Anion Gap 7 (L) 8 - 16 mmol/L    eGFR if African American >60 >60 mL/min/1.73 m^2    eGFR if non African American >60 >60 mL/min/1.73 m^2   C-reactive protein     Collection Time: 03/02/17  2:21 PM   Result Value Ref Range    CRP 22.4 (H) 0.0 - 8.2 mg/L   Sedimentation rate, manual    Collection Time: 03/02/17  2:21 PM   Result Value Ref Range    Sed Rate 13 0 - 20 mm/Hr   WBC & Diff,Body Fluid Joint Fluid, Left Knee    Collection Time: 03/02/17  3:10 PM   Result Value Ref Range    Body Fluid Type Joint Fluid, Left Knee     Fluid Appearance Cloudy     Fluid Color Yellow     WBC, Body Fluid 02975 /cu mm    Segs, Fluid 76 %    Lymphs, Fluid 17 %    Monocytes/Macrophages, Fluid 7 %   Rheumatoid factor    Collection Time: 03/02/17  3:40 PM   Result Value Ref Range    Rheumatoid Factor <10.0 0.0 - 15.0 IU/mL   Cyclic citrul peptide antibody, IgG    Collection Time: 03/02/17  3:40 PM   Result Value Ref Range    CCP Antibodies <0.5 <5.0 U/mL          Assessment:       1. FELIBERTO (juvenile idiopathic arthritis)    2. Knee effusion, left    3. BMI 40.0-44.9, adult        1.  Left knee effusion: has gone to the ER to get steroid shots several times, these dont really work; fluid found to be inflammatory after aspiration and microscope eval, see below  2.  FELIBERTO oligoarticular - Active disease, gregorio knee effusions L>R, h/o enbrel which seemed to help, unclear if has taken mtx in the past; cdai 12, mhaq 1.7  3.  BMI 42, obesity    Plan:       -Aspirate and inject left knee as below  -Will start mtx, could add enbrel vs humira in the future if needed  -Will get RF, CCP, KENDRICK, hepatits B and C   -MTX 2.5mg    -start 4 tablets once a week x 2 weeks (10mg)    -then increase to 6 tablets once a week x 2wks (15mg)    -folic acid daily 1 mg    Skip mtx dose for infections or surgery, call office with any questions  -Birth control measures discussed  -counseled patient on weight loss measures -encouraged patient to make better dietary decisions and  incorporate some regular exercise into their lifestyle    Procedure note: After verbal consent was obtained.  The left knee was prepared with sterile prep.   The skin was anesthetized with 1% ethyl chloride.  The knee joint was injected with 3 cc of 1% lidocaine to anesthetize the knee.  The joint was then aspirated with an 18 gauge needle.  40ccs of cloudy yellow fluid was obtained and send to lab for cell count.  The joint was then injected with 3mg celestone/soluspan, 80 mg Depomedrol and 1ml of 1% lidocaine.  Hemostasis was obtained.  The patient tolerated procedure well with no complications.  discharge and icing instructions given to patient. The fluid was examined under microscope and was found to be Inflammatory fluid, 10-20WBCper hpf, fair viscocity.no crystals seen.     Case presented to Dr Celestin. He was present in the exam room. PE done independently. Impression and Plan done in collaboration  Hx and exam reviewed  with Mrs Armstrong, helped develop  Imp/Plan as above and discussed same with Diff. Dx considered. I  Reviewed synovial fluid- fair viscosity, 10-20 wbc/hpf; 5-10 rbc/hpf- ,no crystals= inflammatory fluid consistent with active FELIBERTO DARRION Celesitn MD

## 2017-03-02 NOTE — PATIENT INSTRUCTIONS
Birth control!!!!    Repeat some labs today    Will start methotrexate.     MTX 2.5mg  -start 4 tablets once a week x 2 weeks (10mg)  -then increase to 6 tablets once a week x 2wks (15mg)  -folic acid daily 1 mg    Skip mtx dose for infections or surgery, call office with any questions

## 2017-03-03 RX ADMIN — BETAMETHASONE SODIUM PHOSPHATE AND BETAMETHASONE ACETATE 3 MG: 3; 3 INJECTION, SUSPENSION INTRA-ARTICULAR; INTRALESIONAL; INTRAMUSCULAR at 11:03

## 2017-03-03 RX ADMIN — METHYLPREDNISOLONE ACETATE 80 MG: 80 INJECTION, SUSPENSION INTRALESIONAL; INTRAMUSCULAR; INTRASYNOVIAL; SOFT TISSUE at 11:03

## 2017-03-03 ASSESSMENT — CLINICAL DISEASE ACTIVITY INDEX (CDAI)
PATIENT_ASSESSMENT: 7
TENDER_JOINTS_COUNT: 1
SWOLLEN_JOINTS_COUNT: 2
TOTAL_SCORE: 12
PHYSICIAN_ASSESSMENT: 2

## 2017-04-05 ENCOUNTER — LAB VISIT (OUTPATIENT)
Dept: LAB | Facility: HOSPITAL | Age: 22
End: 2017-04-05
Attending: INTERNAL MEDICINE
Payer: MEDICAID

## 2017-04-05 ENCOUNTER — TELEPHONE (OUTPATIENT)
Dept: PHARMACY | Facility: CLINIC | Age: 22
End: 2017-04-05

## 2017-04-05 ENCOUNTER — OFFICE VISIT (OUTPATIENT)
Dept: RHEUMATOLOGY | Facility: CLINIC | Age: 22
End: 2017-04-05
Payer: MEDICAID

## 2017-04-05 VITALS — DIASTOLIC BLOOD PRESSURE: 74 MMHG | HEART RATE: 91 BPM | SYSTOLIC BLOOD PRESSURE: 110 MMHG

## 2017-04-05 DIAGNOSIS — Z11.1 SCREENING-PULMONARY TB: ICD-10-CM

## 2017-04-05 DIAGNOSIS — M08.80 JIA (JUVENILE IDIOPATHIC ARTHRITIS): ICD-10-CM

## 2017-04-05 DIAGNOSIS — M06.061 RHEUMATOID ARTHRITIS INVOLVING RIGHT KNEE WITH NEGATIVE RHEUMATOID FACTOR: ICD-10-CM

## 2017-04-05 DIAGNOSIS — Z79.899 HIGH RISK MEDICATION USE: ICD-10-CM

## 2017-04-05 DIAGNOSIS — M25.462 KNEE EFFUSION, LEFT: Chronic | ICD-10-CM

## 2017-04-05 DIAGNOSIS — M08.80 JIA (JUVENILE IDIOPATHIC ARTHRITIS): Primary | ICD-10-CM

## 2017-04-05 DIAGNOSIS — M25.461 KNEE EFFUSION, RIGHT: ICD-10-CM

## 2017-04-05 DIAGNOSIS — D84.9 IMMUNOCOMPROMISED: ICD-10-CM

## 2017-04-05 PROCEDURE — 99214 OFFICE O/P EST MOD 30 MIN: CPT | Mod: 25,S$PBB,, | Performed by: PHYSICIAN ASSISTANT

## 2017-04-05 PROCEDURE — 36415 COLL VENOUS BLD VENIPUNCTURE: CPT | Mod: PO

## 2017-04-05 PROCEDURE — 20610 DRAIN/INJ JOINT/BURSA W/O US: CPT | Mod: S$PBB,,, | Performed by: PHYSICIAN ASSISTANT

## 2017-04-05 PROCEDURE — 86480 TB TEST CELL IMMUN MEASURE: CPT

## 2017-04-05 PROCEDURE — 99999 PR PBB SHADOW E&M-EST. PATIENT-LVL III: CPT | Mod: PBBFAC,,, | Performed by: PHYSICIAN ASSISTANT

## 2017-04-05 RX ORDER — ETANERCEPT 50 MG/ML
50 SOLUTION SUBCUTANEOUS WEEKLY
Qty: 4 SYRINGE | Refills: 6 | Status: SHIPPED | OUTPATIENT
Start: 2017-04-05 | End: 2017-04-12 | Stop reason: SDUPTHER

## 2017-04-05 RX ORDER — BETAMETHASONE SODIUM PHOSPHATE AND BETAMETHASONE ACETATE 3; 3 MG/ML; MG/ML
3 INJECTION, SUSPENSION INTRA-ARTICULAR; INTRALESIONAL; INTRAMUSCULAR; SOFT TISSUE
Status: COMPLETED | OUTPATIENT
Start: 2017-04-05 | End: 2017-04-05

## 2017-04-05 RX ORDER — METHYLPREDNISOLONE ACETATE 80 MG/ML
80 INJECTION, SUSPENSION INTRA-ARTICULAR; INTRALESIONAL; INTRAMUSCULAR; SOFT TISSUE
Status: COMPLETED | OUTPATIENT
Start: 2017-04-05 | End: 2017-04-05

## 2017-04-05 RX ADMIN — METHYLPREDNISOLONE ACETATE 80 MG: 80 INJECTION, SUSPENSION INTRALESIONAL; INTRAMUSCULAR; INTRASYNOVIAL; SOFT TISSUE at 09:04

## 2017-04-05 RX ADMIN — BETAMETHASONE SODIUM PHOSPHATE AND BETAMETHASONE ACETATE 3 MG: 3; 3 INJECTION, SUSPENSION INTRA-ARTICULAR; INTRALESIONAL; INTRAMUSCULAR at 09:04

## 2017-04-05 ASSESSMENT — ROUTINE ASSESSMENT OF PATIENT INDEX DATA (RAPID3): MDHAQ FUNCTION SCORE: 2

## 2017-04-05 NOTE — TELEPHONE ENCOUNTER
Called to notify patient PA required on Enbrel.  Explained PA process to patient, which can take 1 to 3 days depending on her insurance.  Told her once result of TB test is returned, then I can submit PA and from there it will take 1 to 3 days.  Patient verbalized understanding and was thankful for the call.

## 2017-04-05 NOTE — MR AVS SNAPSHOT
[facility]  [address] [facility] [address]  Phone: [phone]  Fax: [phone]                  Padmini Wren   2017 7:30 AM   Office Visit    Description:  Female : 1995   Provider:  Jannet Saha PA-C   Department:  [facility] - Rheumatology           Reason for Visit     Knee Pain     FELIBERTO           Diagnoses this Visit        Comments    FELIBERTO (juvenile idiopathic arthritis)    -  Primary     Knee effusion, left         BMI 40.0-44.9, adult         High risk medication use         Screening-pulmonary TB         Immunocompromised         Knee effusion, right         Rheumatoid arthritis involving right knee with negative rheumatoid factor                To Do List           Future Appointments        Provider Department Dept Phone    2017 1:40 PM SPECIMEN, [facility] [phone]    2017 10:40 AM LABORATORY, [facility] [phone]    2017 11:00 AM Jannet Saha PA-C [facility] [phone]    7/10/2017 10:00 AM LABORATORY, [facility] [phone]      Goals (5 Years of Data)     None      Follow-Up and Disposition     Follow-up and Disposition History       These Medications        Disp Refills Start End    ENBREL SURECLICK 50 mg/mL (0.98 mL) PnIj 4 Syringe 6 2017    Inject 50 mg into the skin once a week. - Subcutaneous    Pharmacy: [facility] [address] [place] [address] Ph #: [phone]         [facility]     [facility] Nurse Care Line -  Assistance  Unless otherwise directed by your provider, please contact [facility], our nurse care line that is available for  assistance.     Registered nurses in the [facility] provide: appointment scheduling, clinical advisement, health education, and other advisory services.  Call: [phone] (toll free)                Medications           Message regarding Medications     Verify the changes and/or additions to your medication regime listed below are the same as discussed with your clinician today.  If any of these changes or additions are incorrect, please notify your healthcare provider.        START taking these NEW medications        Refills    ENBREL SURECLICK 50 mg/mL (0.98 mL) PnIj 6    Sig: Inject 50 mg into the skin once a week.    Class: Normal    Route: Subcutaneous      These medications were administered today        Dose Freq    betamethasone acetate-betamethasone sodium phosphate injection 3 mg 3 mg Clinic/HOD 1 time    Sig: Inject 0.5 mLs (3 mg total) into the articular space one time.    Class: Normal    Route: Intra-articular    methylPREDNISolone acetate injection 80 mg 80 mg Clinic/HOD 1 time    Sig: Inject 1 mL (80 mg total) into the articular space one time.    Class: Normal    Route: Intra-articular      STOP taking these medications     etonogestrel (NEXPLANON) 68 mg Impl by Subdermal route.    etanercept (ENBREL) 50 mg/mL (0.98 mL) injection Inject 1 mL (50 mg total) into the skin once a week.           Verify that the below list of medications is an accurate representation of the medications you are currently taking.  If none reported, the list may be blank. If incorrect, please contact your healthcare provider. Carry this list with you in case of emergency.           Current Medications     diclofenac (VOLTAREN) 50 MG EC tablet Take 1 tablet (50 mg total) by mouth 2 (two) times daily.    ENBREL SURECLICK 50 mg/mL (0.98 mL) PnIj Inject 50 mg into the skin once a week.    folic acid (FOLVITE) 1 MG tablet Take 1 tablet (1 mg total) by mouth once daily.    hydrocodone-acetaminophen 10-325mg (NORCO)  mg Tab Take 1 tablet by mouth every 4 (four) hours as needed.    methotrexate 2.5 MG Tab Take 4 (10mg) tablets once a week for two weeks, then increase to 6 (15mg) tablets once a week    nabumetone  (RELAFEN) 500 MG tablet Take 1 tablet (500 mg total) by mouth 2 (two) times daily.    naproxen sodium (ALEVE) 220 mg Cap Take by mouth. 2 pills twice daily           Clinical Reference Information           Your Vitals Were     BP Pulse Last Period             110/74 91 03/29/2017         Blood Pressure          Most Recent Value    BP  110/74      Allergies as of 4/5/2017     No Known Allergies      Immunizations Administered on Date of Encounter - 4/5/2017     None      Orders Placed During Today's Visit     Future Labs/Procedures Expected by Expires    Quantiferon Gold TB  4/5/2017 6/4/2018    Recurring Lab Work Interval Expires    C-reactive protein   4/5/2018    CBC auto differential   4/5/2018    Comprehensive metabolic panel   4/5/2018    Sedimentation rate, manual   4/5/2018      Administrations This Visit     betamethasone acetate-betamethasone sodium phosphate injection 3 mg     Admin Date Action Dose Route Administered By             04/05/2017 Given 3 mg Intra-articular Jannet GLORIA Saha PA-C                    methylPREDNISolone acetate injection 80 mg     Admin Date Action Dose Route Administered By             04/05/2017 Given 80 mg Intra-articular Jannet GLORIA Saha PA-C                      [facility] Sign-Up     Activating your [facility] account is as easy as 1-2-3!     1) Visit [email or web address], select Sign Up Now, enter this activation code and your date of birth, then select Next.  [email or web address]  Expires: 4/16/2017  4:29 PM      2) Create a username and password to use when you visit [facility] in the future and select a security question in case you lose your password and select Next.    3) Enter your e-mail address and click Sign Up!    Additional Information  If you have questions, please e-mail [email or web address] or call [phone] to talk to our [facility] staff. Remember, [facility] is NOT to be used for urgent needs. For medical emergencies, dial 911.         Language Assistance Services      ATTENTION: Language assistance services are available, free of charge. Please call [phone].      ATENCIÓN: Si habla diaz, tiene a man disposición servicios gratuitos de asistencia lingüística. Llame al [phone].     CHÚ Ý: N?u b?n nói Ti?ng Vi?t, có các d?ch v? h? tr? ngôn ng? mi?n phí dành cho b?n. G?i s? [phone].         [facility] - Rheumatology complies with applicable Federal civil rights laws and does not discriminate on the basis of race, color, national origin, age, disability, or sex.

## 2017-04-05 NOTE — PROGRESS NOTES
Subjective:       Patient ID: Padmini Wren is a 22 y.o. female.    Chief Complaint: Knee Pain and FELIBERTO    HPI Comments: Padmini is a 21 y/o female with a history of juvenile idiopathic arthritis.  Saw Daphne 1 month ago. Prior to that had been last seen April of 2014. At the time was off all RA treatment. Came in with gregorio knee and ankle pain. Left knee effusion. Left knee drained and injected with steroids. Fluid was inflammatory. Started on mtx and titrated up to 15 mg once weekly. Taking folic acid. Not on birth control. Saw her GYN to start depo-provera injections soon. She is aware she cannot become pregnant while taking mtx. In the past took enbrel which did well to control her RA. She has been off about 2 years due to medicaid not covering it any longer. Most of her joint symptoms have always been in her knees, feet and ankles. Today her with c/o gregorio knee pain right much worse than left. The steroid injection did help the left. Also gregorio ankle pain. Unable to take care of her 2 y/o son. She is in a wheelchair today. Cannot bear weight on right knee and right ankle. She complains of 8/10 pain today.  She denies pain in her wrists or hands. No sob or chest pain.  She denies fever, fatigue, rash, hair loss, or weight loss.  She denies any symptoms of Raynauds. No infections have occurred since last visit.     Knee Pain   Associated symptoms include arthralgias and joint swelling. Pertinent negatives include no abdominal pain, chest pain, chills, coughing, fatigue, fever, headaches, myalgias, nausea, numbness, rash, sore throat, vomiting or weakness.     Review of Systems   Constitutional: Negative for chills, fatigue and fever.   HENT: Negative for mouth sores, rhinorrhea and sore throat.    Eyes: Negative for pain and redness.   Respiratory: Negative for cough and shortness of breath.    Cardiovascular: Negative for chest pain.   Gastrointestinal: Negative for abdominal pain, constipation,  diarrhea, nausea and vomiting.        Reflux   Genitourinary: Negative for dysuria and hematuria.   Musculoskeletal: Positive for arthralgias and joint swelling. Negative for myalgias.   Skin: Negative for color change and rash.   Neurological: Negative for weakness, numbness and headaches.   Psychiatric/Behavioral: The patient is not nervous/anxious.          Objective:   /74  Pulse 91  LMP 03/29/2017 Comment: no birth controll, but will be starting depo inj     Physical Exam   Constitutional: She is oriented to person, place, and time and well-developed, well-nourished, and in no distress. No distress.   HENT:   Head: Normocephalic and atraumatic.   Right Ear: External ear normal.   Left Ear: External ear normal.   Mouth/Throat: No oropharyngeal exudate.   Eyes: Conjunctivae and EOM are normal. Pupils are equal, round, and reactive to light. No scleral icterus.   Neck: Normal range of motion. Neck supple. No thyromegaly present.   Cardiovascular: Normal rate, regular rhythm and normal heart sounds.    No murmur heard.  Pulmonary/Chest: Effort normal and breath sounds normal. She exhibits no tenderness.   Abdominal: Soft. Bowel sounds are normal.       Right Side Rheumatological Exam     The patient is tender to palpation of the knee, ankle and talocalcaneal    She has swelling of the knee, ankle and talocalcaneal    Left Side Rheumatological Exam     The patient is tender to palpation of the knee, ankle and talocalcaneal.    She has swelling of the knee, knee and talocalcaneal      Lymphadenopathy:     She has no cervical adenopathy.   Neurological: She is alert and oriented to person, place, and time. She displays normal reflexes. No cranial nerve deficit. She exhibits normal muscle tone. Gait normal.   Skin: Skin is warm and dry. No rash noted.     Musculoskeletal: Normal range of motion. She exhibits edema and tenderness.   gregorio wrists, mcps, pips no synovitis, no tenderness, full rom  gregorio elbows and  shoulder no swelling, no tenderness, full rom    gregorio hips no pain with int/ext rotation    Right knee small effusion, nontender, good rom  Left knee moderate effusion, +warmth, no erythema, decreased rom due to pain    gregorio ankles no swelling or tenderness, scar the the medial right ankle from old surgery         No results found for this or any previous visit (from the past 168 hour(s)).        Component      Latest Ref Rng & Units 3/2/2017   WBC      3.90 - 12.70 K/uL 16.21 (H)   RBC      4.00 - 5.40 M/uL 4.65   Hemoglobin      12.0 - 16.0 g/dL 13.5   Hematocrit      37.0 - 48.5 % 42.6   MCV      82 - 98 fL 92   MCH      27.0 - 31.0 pg 29.0   MCHC      32.0 - 36.0 % 31.7 (L)   RDW      11.5 - 14.5 % 14.0   Platelets      150 - 350 K/uL 333   MPV      9.2 - 12.9 fL 9.6   Gran #      1.8 - 7.7 K/uL 9.4 (H)   Lymph #      1.0 - 4.8 K/uL 5.8 (H)   Mono #      0.3 - 1.0 K/uL 0.8   Eos #      0.0 - 0.5 K/uL 0.1   Baso #      0.00 - 0.20 K/uL 0.02   Gran%      38.0 - 73.0 % 58.0   Lymph%      18.0 - 48.0 % 36.0   Mono%      4.0 - 15.0 % 5.2   Eosinophil%      0.0 - 8.0 % 0.7   Basophil%      0.0 - 1.9 % 0.1   Differential Method       Automated   Sodium      136 - 145 mmol/L 141   Potassium      3.5 - 5.1 mmol/L 4.0   Chloride      95 - 110 mmol/L 106   CO2      23 - 29 mmol/L 28   Glucose      70 - 110 mg/dL 97   BUN, Bld      6 - 20 mg/dL 14   Creatinine      0.5 - 1.4 mg/dL 0.8   Calcium      8.7 - 10.5 mg/dL 8.8   Total Protein      6.0 - 8.4 g/dL 7.0   Albumin      3.5 - 5.2 g/dL 3.0 (L)   Total Bilirubin      0.1 - 1.0 mg/dL 0.2   Alkaline Phosphatase      55 - 135 U/L 69   AST      10 - 40 U/L 12   ALT      10 - 44 U/L 15   Anion Gap      8 - 16 mmol/L 7 (L)   eGFR if African American      >60 mL/min/1.73 m:2 >60   eGFR if non African American      >60 mL/min/1.73 m:2 >60   Anti Sm Antibody      0.00 - 19.99 EU 0.55   Anti-Sm Interpretation      Negative Negative   Anti-SSA Antibody      0.00 - 19.99 EU 0.00    Anti-SSA Interpretation      Negative Negative   Anti-SSB Antibody      0.00 - 19.99 EU 0.10   Anti-SSB Interpretation      Negative Negative   ds DNA Ab      Negative 1:10 Negative 1:10   Anti Sm/RNP Antibody      0.00 - 19.99 EU 1.26   Anti-Sm/RNP Interpretation      Negative Negative   Body Fluid Type       Joint Fluid, Left Knee   Fluid Appearance       Cloudy   Fluid Color       Yellow   WBC, Body Fluid      /cu mm 16554   Segs, Fluid      % 76   Lymphs, Fluid      % 17   Monocytes/Macrophages, Fluid      % 7   CRP      0.0 - 8.2 mg/L 22.4 (H)   Sed Rate      0 - 20 mm/Hr 13   KENDRICK Screen      Negative <1:160 Positive (A)   Rheumatoid Factor      0.0 - 15.0 IU/mL <10.0   CCP Antibodies      <5.0 U/mL <0.5   Hepatitis B Surface Ag       Negative   Hepatitis C Ab       Negative   KENDRICK HEP-2 Titer       Positive 1:160 Homogeneous Speckled        Assessment:       1. FELIBERTO (juvenile idiopathic arthritis)    2. Knee effusion, left    3. BMI 40.0-44.9, adult    4. High risk medication use    5. Screening-pulmonary TB    6. Immunocompromised        1.  Right knee effusion- FELIBERTO with activity in both knees and both ankles with prolonged morning stiffness back on mtx with no improvement in her symtpoms    In the past failed mtx monotherapy  Good response to enbrel    2.  FELIBERTO oligoarticular - Active disease, gregorio knee effusions L>R, h/o enbrel which seemed to help, unclear if has taken mtx in the past; cdai 12, mhaq 1.7    3.  BMI 42, obesity    4. Medication Monitoring- no current issues, no evidence of toxicity    5. Immunocompromised no issues with recurrent infections      Plan:       -inject right knee knee as below  -increase her  mtx to 20 mg once weekly   -folic acid daily 1 mg while on mtx      -Skip mtx dose for infections or surgery, call office with any questions-   -Birth control measures discussed- she will start depo-provera through her GYN office    -quantiferon gold TB test today    -move her back to enbrel  since she responded so well in the past with no side effects   -Discussed risk versus benefits and potential side effects of enbrel. Medication Literature given to patient   - Always remember to Hold enbrel and mtx  for signs of infection or for surgery     --counseled patient on weight loss measures -encouraged patient to make better dietary decisions and  incorporate some regular exercise into their lifestyle    rtc 8 weeks with labs      Procedure note: After verbal consent was obtained.  The right knee was prepared with sterile prep.  The skin was anesthetized with 1% ethyl chloride.  The knee joint was injected with 3 cc of 1% lidocaine to anesthetize the knee.  The joint was then injected with 3mg celestone/soluspan, 80 mg Depomedrol and 1ml of 1% lidocaine.  Hemostasis was obtained.  The patient tolerated procedure well with no complications.  discharge and icing instructions given to patient.

## 2017-04-07 LAB
MITOGEN NIL: 6.92 IU/ML
NIL: 0.04 IU/ML
TB ANTIGEN NIL: -0.01 IU/ML
TB ANTIGEN: 0.02 IU/ML
TB GOLD: NEGATIVE

## 2017-04-10 ENCOUNTER — TELEPHONE (OUTPATIENT)
Dept: PHARMACY | Facility: CLINIC | Age: 22
End: 2017-04-10

## 2017-04-10 ENCOUNTER — TELEPHONE (OUTPATIENT)
Dept: RHEUMATOLOGY | Facility: CLINIC | Age: 22
End: 2017-04-10

## 2017-04-10 NOTE — TELEPHONE ENCOUNTER
Called and spoke with patient notifying her that PA for Enbrel was approved with a $3.00 copayment.    Explained to Ms. Washington that her Enbrel prescription is being sent to [facility] in [place] and a member of our [facility] Team will be calling her to obtain information, set up a shipping date, obtain consultation and payment information. She is aware and verbalized understanding that she must speak with a member of our team at [facility] before her medicaiton is shipped and understands it is coming from [place].     PA Information  [facility]   [phone]  Approval Dates: 4/10/17-4/10/18  Case ID # [id number]

## 2017-04-11 ENCOUNTER — TELEPHONE (OUTPATIENT)
Dept: PHARMACY | Facility: CLINIC | Age: 22
End: 2017-04-11

## 2017-04-12 ENCOUNTER — TELEPHONE (OUTPATIENT)
Dept: PHARMACY | Facility: CLINIC | Age: 22
End: 2017-04-12

## 2017-04-12 DIAGNOSIS — M08.80 JIA (JUVENILE IDIOPATHIC ARTHRITIS): ICD-10-CM

## 2017-04-12 DIAGNOSIS — M06.061 RHEUMATOID ARTHRITIS INVOLVING RIGHT KNEE WITH NEGATIVE RHEUMATOID FACTOR: ICD-10-CM

## 2017-04-12 RX ORDER — ETANERCEPT 50 MG/ML
50 SOLUTION SUBCUTANEOUS WEEKLY
Qty: 4 SYRINGE | Refills: 6 | Status: SHIPPED | OUTPATIENT
Start: 2017-04-12 | End: 2017-04-18 | Stop reason: SDUPTHER

## 2017-04-18 DIAGNOSIS — M06.061 RHEUMATOID ARTHRITIS INVOLVING RIGHT KNEE WITH NEGATIVE RHEUMATOID FACTOR: ICD-10-CM

## 2017-04-18 DIAGNOSIS — M08.80 JIA (JUVENILE IDIOPATHIC ARTHRITIS): ICD-10-CM

## 2017-04-18 RX ORDER — ETANERCEPT 50 MG/ML
50 SOLUTION SUBCUTANEOUS WEEKLY
Qty: 4 SYRINGE | Refills: 6 | Status: SHIPPED | OUTPATIENT
Start: 2017-04-18 | End: 2017-07-11 | Stop reason: DRUGHIGH

## 2017-04-27 ENCOUNTER — TELEPHONE (OUTPATIENT)
Dept: RHEUMATOLOGY | Facility: CLINIC | Age: 22
End: 2017-04-27

## 2017-04-27 NOTE — TELEPHONE ENCOUNTER
----- Message from Sindi Huerta sent at 4/26/2017  4:31 PM CDT -----  Contact:   [facility] at [phone]    [facility] at [phone], Pharmacy has a questions about the patient inusrance and needing prior Authorization on Emril.  Please advise. Thanks.

## 2017-06-05 ENCOUNTER — TELEPHONE (OUTPATIENT)
Dept: RHEUMATOLOGY | Facility: CLINIC | Age: 22
End: 2017-06-05

## 2017-06-05 NOTE — TELEPHONE ENCOUNTER
Make sure we reschedule this pt  She no showed today  Last visit i started enbrel  She needs recheck and reg 4 labs

## 2017-07-11 ENCOUNTER — HOSPITAL ENCOUNTER (EMERGENCY)
Facility: HOSPITAL | Age: 22
Discharge: HOME | End: 2017-07-11
Attending: EMERGENCY MEDICINE
Payer: MEDICAID

## 2017-07-11 VITALS
DIASTOLIC BLOOD PRESSURE: 60 MMHG | SYSTOLIC BLOOD PRESSURE: 108 MMHG | BODY MASS INDEX: 40.75 KG/M2 | HEIGHT: 63 IN | WEIGHT: 230 LBS | OXYGEN SATURATION: 99 % | RESPIRATION RATE: 20 BRPM | HEART RATE: 64 BPM | TEMPERATURE: 98 F

## 2017-07-11 DIAGNOSIS — Z3A.01 LESS THAN 8 WEEKS GESTATION OF PREGNANCY: Primary | ICD-10-CM

## 2017-07-11 LAB
ABO + RH BLD: NORMAL
ALBUMIN SERPL BCP-MCNC: 3.4 G/DL
ALP SERPL-CCNC: 64 U/L
ALT SERPL W/O P-5'-P-CCNC: 14 U/L
ANION GAP SERPL CALC-SCNC: 10 MMOL/L
AST SERPL-CCNC: 18 U/L
B-HCG UR QL: POSITIVE
BACTERIA #/AREA URNS HPF: NORMAL /HPF
BACTERIA GENITAL QL WET PREP: ABNORMAL
BASOPHILS # BLD AUTO: 0.02 K/UL
BASOPHILS NFR BLD: 0.2 %
BILIRUB SERPL-MCNC: 0.4 MG/DL
BILIRUB UR QL STRIP: NEGATIVE
BLD GP AB SCN CELLS X3 SERPL QL: NORMAL
BUN SERPL-MCNC: 6 MG/DL
CALCIUM SERPL-MCNC: 8.7 MG/DL
CHLORIDE SERPL-SCNC: 109 MMOL/L
CLARITY UR: CLEAR
CLUE CELLS VAG QL WET PREP: ABNORMAL
CO2 SERPL-SCNC: 22 MMOL/L
COLOR UR: YELLOW
CREAT SERPL-MCNC: 0.6 MG/DL
DIFFERENTIAL METHOD: ABNORMAL
EOSINOPHIL # BLD AUTO: 0.3 K/UL
EOSINOPHIL NFR BLD: 2.6 %
ERYTHROCYTE [DISTWIDTH] IN BLOOD BY AUTOMATED COUNT: 14.7 %
EST. GFR  (AFRICAN AMERICAN): >60 ML/MIN/1.73 M^2
EST. GFR  (NON AFRICAN AMERICAN): >60 ML/MIN/1.73 M^2
FILAMENT FUNGI VAG WET PREP-#/AREA: ABNORMAL
GLUCOSE SERPL-MCNC: 76 MG/DL
GLUCOSE UR QL STRIP: NEGATIVE
HCG INTACT+B SERPL-ACNC: 368 MIU/ML
HCT VFR BLD AUTO: 42.7 %
HGB BLD-MCNC: 13.7 G/DL
HGB UR QL STRIP: NEGATIVE
KETONES UR QL STRIP: NEGATIVE
LEUKOCYTE ESTERASE UR QL STRIP: ABNORMAL
LYMPHOCYTES # BLD AUTO: 3.9 K/UL
LYMPHOCYTES NFR BLD: 31.7 %
MCH RBC QN AUTO: 29 PG
MCHC RBC AUTO-ENTMCNC: 32.1 %
MCV RBC AUTO: 90 FL
MICROSCOPIC COMMENT: NORMAL
MONOCYTES # BLD AUTO: 0.9 K/UL
MONOCYTES NFR BLD: 6.9 %
NEUTROPHILS # BLD AUTO: 7.2 K/UL
NEUTROPHILS NFR BLD: 58.6 %
NITRITE UR QL STRIP: NEGATIVE
PH UR STRIP: 6 [PH] (ref 5–8)
PLATELET # BLD AUTO: 317 K/UL
PMV BLD AUTO: 10.1 FL
POTASSIUM SERPL-SCNC: 3.9 MMOL/L
PROT SERPL-MCNC: 7.3 G/DL
PROT UR QL STRIP: NEGATIVE
RBC # BLD AUTO: 4.73 M/UL
RBC #/AREA URNS HPF: 1 /HPF (ref 0–4)
SODIUM SERPL-SCNC: 141 MMOL/L
SP GR UR STRIP: >=1.03 (ref 1–1.03)
SPECIMEN SOURCE: ABNORMAL
T VAGINALIS GENITAL QL WET PREP: ABNORMAL
URN SPEC COLLECT METH UR: ABNORMAL
UROBILINOGEN UR STRIP-ACNC: NEGATIVE EU/DL
WBC # BLD AUTO: 12.32 K/UL
WBC #/AREA URNS HPF: 2 /HPF (ref 0–5)
WBC #/AREA VAG WET PREP: ABNORMAL
YEAST GENITAL QL WET PREP: ABNORMAL

## 2017-07-11 PROCEDURE — 87210 SMEAR WET MOUNT SALINE/INK: CPT

## 2017-07-11 PROCEDURE — 86900 BLOOD TYPING SEROLOGIC ABO: CPT

## 2017-07-11 PROCEDURE — 86850 RBC ANTIBODY SCREEN: CPT

## 2017-07-11 PROCEDURE — 81000 URINALYSIS NONAUTO W/SCOPE: CPT

## 2017-07-11 PROCEDURE — 80053 COMPREHEN METABOLIC PANEL: CPT

## 2017-07-11 PROCEDURE — 99283 EMERGENCY DEPT VISIT LOW MDM: CPT

## 2017-07-11 PROCEDURE — 87591 N.GONORRHOEAE DNA AMP PROB: CPT

## 2017-07-11 PROCEDURE — 85025 COMPLETE CBC W/AUTO DIFF WBC: CPT

## 2017-07-11 PROCEDURE — 84702 CHORIONIC GONADOTROPIN TEST: CPT

## 2017-07-11 PROCEDURE — 81025 URINE PREGNANCY TEST: CPT

## 2017-07-11 RX ORDER — ALBUTEROL SULFATE 0.63 MG/3ML
0.63 SOLUTION RESPIRATORY (INHALATION) EVERY 6 HOURS PRN
COMMUNITY
End: 2017-10-10

## 2017-07-11 NOTE — ED PROVIDER NOTES
SCRIBE #1 NOTE: I, Mercedez Ordoñez, am scribing for, and in the presence of, Ranjeet Castillo MD. I have scribed the entire note.      History      Chief Complaint   Patient presents with    Possible Pregnancy     pt reports having 3/4 +pregnancy tests, pt unsure how far along, reports cramping and some spotting, LMP around april/may       Review of patient's allergies indicates:  No Known Allergies     HPI   HPI    7/11/2017, 3:06 PM   History obtained from the patient      History of Present Illness: Padimni Wren is a 22 y.o. female patient who presents to the Emergency Department for abdominal cramping which onset gradually a few days ago. Pain is located to the suprapubic region. Sx have been intermittent and moderate in severity. No modifying factors noted. Pt reports having a positive at-home pregnancy test, but pt has not followed up with OB/GYN. Pt's LMP was 2 months ago. Pt is unsure how far along she is. Associated sx include pelvic pressure when having a BM. Pt denies any fever, chills, n/v/d, constipation, vaginal bleeding, vaginal discharge, dysuria, or hematuria. No further complaints at this time.       Arrival mode: Personal vehicle     PCP: Taryn Salcido MD       Past Medical History:  Past Medical History:   Diagnosis Date    Anxiety     Arthritis     Asthma     JRA (juvenile rheumatoid arthritis)     Seizures        Past Surgical History:  Past Surgical History:   Procedure Laterality Date    ANKLE FRACTURE SURGERY      CYST REMOVAL      tubes ears           Family History:  Family History   Problem Relation Age of Onset    Inflammatory bowel disease Mother     Asthma Mother     Asthma Brother     Asthma Brother        Social History:  Social History     Social History Main Topics    Smoking status: Never Smoker    Smokeless tobacco: Unknown     Alcohol use Yes    Drug use: No    Sexual activity: Yes     Birth control/ protection: None       ROS   Review  of Systems   Constitutional: Negative for chills, diaphoresis and fever.   HENT: Negative for sore throat.    Respiratory: Negative for shortness of breath.    Cardiovascular: Negative for chest pain.   Gastrointestinal: Positive for abdominal pain (cramping). Negative for blood in stool, constipation, diarrhea, nausea and vomiting.   Genitourinary: Positive for pelvic pain (pressure). Negative for dysuria, hematuria, vaginal bleeding, vaginal discharge and vaginal pain.   Musculoskeletal: Negative for back pain.   Skin: Negative for rash.   Neurological: Negative for dizziness, weakness, light-headedness, numbness and headaches.   Hematological: Does not bruise/bleed easily.       Physical Exam      Initial Vitals [07/11/17 1433]   BP Pulse Resp Temp SpO2   (!) 146/85 67 18 98.1 °F (36.7 °C) 100 %      MAP       105.33          Physical Exam  Nursing Notes and Vital Signs Reviewed.  Constitutional: Patient is in no acute distress. Well-developed and well-nourished.  Head: Atraumatic. Normocephalic.  Eyes: PERRL. EOM intact. Conjunctivae are not pale. No scleral icterus.  ENT: Mucous membranes are moist. Oropharynx is clear and symmetric.    Neck: Supple. Full ROM. No lymphadenopathy.  Cardiovascular: Regular rate. Regular rhythm. No murmurs, rubs, or gallops. Distal pulses are 2+ and symmetric.  Pulmonary/Chest: No respiratory distress. Clear to auscultation bilaterally. No wheezing, rales, or rhonchi.  Abdominal: Soft and non-distended.  There is no tenderness.  No rebound, guarding, or rigidity. Obese.   Musculoskeletal: Moves all extremities. No obvious deformities.   Pelvic: A female chaperone was present for this examination. Nl external inspection. No lesions or abnormalities were visible on the labia majora or minora. Cervical os is closed. There is no CMT. There is no blood in the vaginal vault. Thick white discharge. No adnexal tenderness. No adnexal masses.   Skin: Warm and dry.  Neurological:  Alert,  "awake, and appropriate.  Normal speech.  No acute focal neurological deficits are appreciated.  Psychiatric: Normal affect. Good eye contact. Appropriate in content.    ED Course    Procedures  ED Vital Signs:  Vitals:    07/11/17 1433 07/11/17 1631   BP: (!) 146/85 108/60   Pulse: 67 64   Resp: 18 20   Temp: 98.1 °F (36.7 °C)    TempSrc: Oral    SpO2: 100% 99%   Weight: 104.3 kg (230 lb)    Height: 5' 3" (1.6 m)        Abnormal Lab Results:  Labs Reviewed   URINALYSIS - Abnormal; Notable for the following:        Result Value    Specific Gravity, UA >=1.030 (*)     Leukocytes, UA 1+ (*)     All other components within normal limits   VAGINAL SCREEN - Abnormal; Notable for the following:     Budding Yeast Rare (*)     WBC - Vaginal Screen Rare (*)     Bacteria - Vaginal Screen Occasional (*)     All other components within normal limits   CBC W/ AUTO DIFFERENTIAL - Abnormal; Notable for the following:     RDW 14.7 (*)     All other components within normal limits   COMPREHENSIVE METABOLIC PANEL - Abnormal; Notable for the following:     CO2 22 (*)     Albumin 3.4 (*)     All other components within normal limits   C. TRACHOMATIS/N. GONORRHOEAE BY AMP DNA   PREGNANCY TEST, URINE RAPID   HCG, QUANTITATIVE, PREGNANCY   URINALYSIS MICROSCOPIC   TYPE & SCREEN        All Lab Results:  Results for orders placed or performed during the hospital encounter of 07/11/17   Urinalysis   Result Value Ref Range    Specimen UA Urine, Clean Catch     Color, UA Yellow Yellow, Straw, Tish    Appearance, UA Clear Clear    pH, UA 6.0 5.0 - 8.0    Specific Gravity, UA >=1.030 (A) 1.005 - 1.030    Protein, UA Negative Negative    Glucose, UA Negative Negative    Ketones, UA Negative Negative    Bilirubin (UA) Negative Negative    Occult Blood UA Negative Negative    Nitrite, UA Negative Negative    Urobilinogen, UA Negative <2.0 EU/dL    Leukocytes, UA 1+ (A) Negative   Pregnancy, urine rapid   Result Value Ref Range    Preg Test, Ur " Positive    Vaginal Screen   Result Value Ref Range    Trichomonas None None    Clue Cells, Wet Prep None None    Budding Yeast Rare (A) None    Fungal Hyphae None None    WBC - Vaginal Screen Rare (A) None    Bacteria - Vaginal Screen Occasional (A) None    Wet Prep Source VAG None   hCG, quantitative   Result Value Ref Range    hCG Quant 368 See Text mIU/mL   CBC auto differential   Result Value Ref Range    WBC 12.32 3.90 - 12.70 K/uL    RBC 4.73 4.00 - 5.40 M/uL    Hemoglobin 13.7 12.0 - 16.0 g/dL    Hematocrit 42.7 37.0 - 48.5 %    MCV 90 82 - 98 fL    MCH 29.0 27.0 - 31.0 pg    MCHC 32.1 32.0 - 36.0 %    RDW 14.7 (H) 11.5 - 14.5 %    Platelets 317 150 - 350 K/uL    MPV 10.1 9.2 - 12.9 fL    Gran # 7.2 1.8 - 7.7 K/uL    Lymph # 3.9 1.0 - 4.8 K/uL    Mono # 0.9 0.3 - 1.0 K/uL    Eos # 0.3 0.0 - 0.5 K/uL    Baso # 0.02 0.00 - 0.20 K/uL    Gran% 58.6 38.0 - 73.0 %    Lymph% 31.7 18.0 - 48.0 %    Mono% 6.9 4.0 - 15.0 %    Eosinophil% 2.6 0.0 - 8.0 %    Basophil% 0.2 0.0 - 1.9 %    Differential Method Automated    Comprehensive metabolic panel   Result Value Ref Range    Sodium 141 136 - 145 mmol/L    Potassium 3.9 3.5 - 5.1 mmol/L    Chloride 109 95 - 110 mmol/L    CO2 22 (L) 23 - 29 mmol/L    Glucose 76 70 - 110 mg/dL    BUN, Bld 6 6 - 20 mg/dL    Creatinine 0.6 0.5 - 1.4 mg/dL    Calcium 8.7 8.7 - 10.5 mg/dL    Total Protein 7.3 6.0 - 8.4 g/dL    Albumin 3.4 (L) 3.5 - 5.2 g/dL    Total Bilirubin 0.4 0.1 - 1.0 mg/dL    Alkaline Phosphatase 64 55 - 135 U/L    AST 18 10 - 40 U/L    ALT 14 10 - 44 U/L    Anion Gap 10 8 - 16 mmol/L    eGFR if African American >60 >60 mL/min/1.73 m^2    eGFR if non African American >60 >60 mL/min/1.73 m^2   Urinalysis Microscopic   Result Value Ref Range    RBC, UA 1 0 - 4 /hpf    WBC, UA 2 0 - 5 /hpf    Bacteria, UA Rare None-Occ /hpf    Microscopic Comment SEE COMMENT    Type & Screen   Result Value Ref Range    Group & Rh A POS     Indirect Trista NEG          Imaging  Results:  Imaging Results    None                 The Emergency Provider reviewed the vital signs and test results, which are outlined above.    ED Discussion     5:33 PM: Reassessed pt at this time.  Discussed with pt all pertinent ED information and results. Discussed pt dx and plan of tx. Gave pt all f/u and return to the ED instructions. All questions and concerns were addressed at this time. Pt expresses understanding of information and instructions, and is comfortable with plan to discharge. Pt is stable for discharge.    ED Medication(s):  Medications - No data to display    Current Discharge Medication List          Follow-up Information     ob/gyn.    Contact information:  [phone]                   Medical Decision Making    Medical Decision Making:   Clinical Tests:   Lab Tests: Reviewed and Ordered           Scribe Attestation:   Scribe #1: I performed the above scribed service and the documentation accurately describes the services I performed. I attest to the accuracy of the note.    Attending:   Physician Attestation Statement for Scribe #1: I, Ranjeet Castillo MD, personally performed the services described in this documentation, as scribed by Mercedez Ordoñez, in my presence, and it is both accurate and complete.          Clinical Impression       ICD-10-CM ICD-9-CM   1. Less than 8 weeks gestation of pregnancy Z3A.01 V22.2       Disposition:   Disposition: Discharged  Condition: Stable         Ranjeet Castillo MD  07/11/17 7706

## 2017-07-12 LAB
C TRACH DNA SPEC QL NAA+PROBE: NOT DETECTED
N GONORRHOEA DNA SPEC QL NAA+PROBE: NOT DETECTED

## 2017-07-28 ENCOUNTER — OFFICE VISIT (OUTPATIENT)
Dept: OBSTETRICS AND GYNECOLOGY | Facility: CLINIC | Age: 22
End: 2017-07-28
Payer: MEDICAID

## 2017-07-28 ENCOUNTER — LAB VISIT (OUTPATIENT)
Dept: LAB | Facility: HOSPITAL | Age: 22
End: 2017-07-28
Payer: MEDICAID

## 2017-07-28 VITALS
SYSTOLIC BLOOD PRESSURE: 118 MMHG | WEIGHT: 222.69 LBS | BODY MASS INDEX: 40.98 KG/M2 | DIASTOLIC BLOOD PRESSURE: 74 MMHG | HEIGHT: 62 IN

## 2017-07-28 DIAGNOSIS — M06.061 RHEUMATOID ARTHRITIS INVOLVING RIGHT KNEE WITH NEGATIVE RHEUMATOID FACTOR: ICD-10-CM

## 2017-07-28 DIAGNOSIS — O26.849 UTERINE SIZE DATE DISCREPANCY, UNSPECIFIED TRIMESTER: Primary | ICD-10-CM

## 2017-07-28 DIAGNOSIS — F41.9 ANXIETY: ICD-10-CM

## 2017-07-28 DIAGNOSIS — R56.9 SEIZURES: ICD-10-CM

## 2017-07-28 DIAGNOSIS — Z87.19 HISTORY OF GALLSTONES: ICD-10-CM

## 2017-07-28 DIAGNOSIS — O26.849 UTERINE SIZE DATE DISCREPANCY, UNSPECIFIED TRIMESTER: ICD-10-CM

## 2017-07-28 PROBLEM — Z79.899 HIGH RISK MEDICATION USE: Status: RESOLVED | Noted: 2017-04-05 | Resolved: 2017-07-28

## 2017-07-28 PROBLEM — Z11.1 SCREENING-PULMONARY TB: Status: RESOLVED | Noted: 2017-04-05 | Resolved: 2017-07-28

## 2017-07-28 PROBLEM — M25.462 KNEE EFFUSION, LEFT: Chronic | Status: RESOLVED | Noted: 2017-03-02 | Resolved: 2017-07-28

## 2017-07-28 PROBLEM — M25.461 KNEE EFFUSION, RIGHT: Status: RESOLVED | Noted: 2017-04-05 | Resolved: 2017-07-28

## 2017-07-28 PROBLEM — D84.9 IMMUNOCOMPROMISED: Status: RESOLVED | Noted: 2017-04-05 | Resolved: 2017-07-28

## 2017-07-28 LAB
ABO + RH BLD: NORMAL
BASOPHILS # BLD AUTO: 0.03 K/UL
BASOPHILS NFR BLD: 0.3 %
BLD GP AB SCN CELLS X3 SERPL QL: NORMAL
DIFFERENTIAL METHOD: ABNORMAL
EOSINOPHIL # BLD AUTO: 0.1 K/UL
EOSINOPHIL NFR BLD: 1.2 %
ERYTHROCYTE [DISTWIDTH] IN BLOOD BY AUTOMATED COUNT: 14.1 %
HCT VFR BLD AUTO: 40.2 %
HGB BLD-MCNC: 12.6 G/DL
LYMPHOCYTES # BLD AUTO: 2.7 K/UL
LYMPHOCYTES NFR BLD: 23.3 %
MCH RBC QN AUTO: 28.2 PG
MCHC RBC AUTO-ENTMCNC: 31.3 G/DL
MCV RBC AUTO: 90 FL
MONOCYTES # BLD AUTO: 0.6 K/UL
MONOCYTES NFR BLD: 4.9 %
NEUTROPHILS # BLD AUTO: 8.2 K/UL
NEUTROPHILS NFR BLD: 70 %
PLATELET # BLD AUTO: 316 K/UL
PMV BLD AUTO: 10.2 FL
RBC # BLD AUTO: 4.47 M/UL
RPR SER QL: NORMAL
WBC # BLD AUTO: 11.75 K/UL

## 2017-07-28 PROCEDURE — 99204 OFFICE O/P NEW MOD 45 MIN: CPT | Mod: S$PBB,TH,, | Performed by: ADVANCED PRACTICE MIDWIFE

## 2017-07-28 PROCEDURE — 99999 PR PBB SHADOW E&M-EST. PATIENT-LVL III: CPT | Mod: PBBFAC,,, | Performed by: ADVANCED PRACTICE MIDWIFE

## 2017-07-28 PROCEDURE — 86703 HIV-1/HIV-2 1 RESULT ANTBDY: CPT

## 2017-07-28 PROCEDURE — 86900 BLOOD TYPING SEROLOGIC ABO: CPT

## 2017-07-28 PROCEDURE — 87340 HEPATITIS B SURFACE AG IA: CPT

## 2017-07-28 PROCEDURE — 86850 RBC ANTIBODY SCREEN: CPT

## 2017-07-28 PROCEDURE — 88175 CYTOPATH C/V AUTO FLUID REDO: CPT

## 2017-07-28 PROCEDURE — 85025 COMPLETE CBC W/AUTO DIFF WBC: CPT

## 2017-07-28 PROCEDURE — 86592 SYPHILIS TEST NON-TREP QUAL: CPT

## 2017-07-28 PROCEDURE — 86762 RUBELLA ANTIBODY: CPT

## 2017-07-28 PROCEDURE — 36415 COLL VENOUS BLD VENIPUNCTURE: CPT

## 2017-07-28 NOTE — PROGRESS NOTES
"Subjective:      Padmini Wren is a 22 y.o. female who presents for evaluation of amenorrhea. She believes she could be pregnant. Pregnancy is desired. Sexual Activity: multiple partners, contraception: none. Current symptoms also include: positive home pregnancy test. Last period was abnormal. Cycles are irregular. She had an hcg in the ER about 2.5 weeks ago that was 368. She did not return for f/u hcg. They did not see anything on u/s at that visit. She was told she was around 5 wga.    Medical hx includes: JRA, anxiety, asthma, obesity and seizure disorder. Patient followed by a specialist for JRA. She is currently taking Enbrel which is pregnancy category B. Encouraged to continue this medication, but to schedule a f/u with specialist to let her know pt is pregnant. Has not used any medications for asthma in "years." States she thinks her sx were anxiety related and not asthma. Recommended to see PCP for this. Also notes seizure disorder, but states that she hasn't taken medication in >5 years. No seizures since then either.     OB hx of  x1 in 2016. She had a NCB at [facility]. She desires NCB again.      Patient unsure who FOB is. Asking about paternity testing. Explained she will need to do this at an outside lab.       Patient's last menstrual period was 05/15/2017.  The following portions of the patient's history were reviewed and updated as appropriate: allergies, current medications, past family history, past medical history, past social history, past surgical history and problem list.    Review of Systems  Pertinent items are noted in HPI.       Objective:      /74   Ht 5' 2" (1.575 m)   Wt 101 kg (222 lb 10.6 oz)   LMP 05/15/2017   BMI 40.73 kg/m²   General: alert, appears stated age, no distress and no acute distress        General:   Genitalia:  AAO x3, WDWN, NAD  Vulva normal in appearance, no rash or lesions noted. Vagina moist, with adequate rugae, pink in " appearance, no tenderness or lesions noted, no discharge noted. Cervix closed, pink, without bleeding or discharge. Specimens collected.       Lab Review  Urine HCG: positive      Assessment:      Absence of menstruation.       Plan:      Pregnancy Test: Positive. Briefly discussed pre- care options. Pregnancy, Childbirth and the [employer] book given. Encouraged well-balanced diet, plenty of rest when needed, pre- vitamins daily and walking for exercise. Discussed self-help for nausea, avoiding OTC medications until consulting provider or pharmacist, other than Tylenol as needed, minimal caffeine (1-2 cups daily) and avoiding alcohol. She will schedule her initial OB visit in the next month with her PCP or OB provider. Feel free to call with any questions.   OB labs and dating sono ordered  ED precautions reviewed

## 2017-07-31 ENCOUNTER — TELEPHONE (OUTPATIENT)
Dept: OBSTETRICS AND GYNECOLOGY | Facility: CLINIC | Age: 22
End: 2017-07-31

## 2017-07-31 DIAGNOSIS — O20.0 THREATENED ABORTION, ANTEPARTUM: Primary | ICD-10-CM

## 2017-07-31 LAB
HBV SURFACE AG SERPL QL IA: NEGATIVE
HIV 1+2 AB+HIV1 P24 AG SERPL QL IA: NEGATIVE
RUBV IGG SER-ACNC: 12.7 IU/ML
RUBV IGG SER-IMP: REACTIVE

## 2017-07-31 NOTE — TELEPHONE ENCOUNTER
I spoke with pt lab appointment scheduled for Aug 1st at [facility] she voiced understanding.

## 2017-07-31 NOTE — TELEPHONE ENCOUNTER
I spoke with pt she was calling for her BHCG results from Friday 7/28.  Pt informed that a BHCG was not ordered as a part of labs from Friday pt insists that she should have had this done.  Please advise

## 2017-07-31 NOTE — TELEPHONE ENCOUNTER
----- Message from Mary Gotti sent at 7/31/2017 10:45 AM CDT -----  Contact:  pt  [phone]  Pt requests the nurse to call her at [phone] (home) regarding lab results.

## 2017-08-01 ENCOUNTER — LAB VISIT (OUTPATIENT)
Dept: LAB | Facility: HOSPITAL | Age: 22
End: 2017-08-01
Attending: ADVANCED PRACTICE MIDWIFE
Payer: MEDICAID

## 2017-08-01 DIAGNOSIS — O20.0 THREATENED ABORTION, ANTEPARTUM: ICD-10-CM

## 2017-08-01 LAB — HCG INTACT+B SERPL-ACNC: NORMAL MIU/ML

## 2017-08-01 PROCEDURE — 36415 COLL VENOUS BLD VENIPUNCTURE: CPT

## 2017-08-01 PROCEDURE — 84702 CHORIONIC GONADOTROPIN TEST: CPT

## 2017-08-02 ENCOUNTER — TELEPHONE (OUTPATIENT)
Dept: OBSTETRICS AND GYNECOLOGY | Facility: CLINIC | Age: 22
End: 2017-08-02

## 2017-08-15 ENCOUNTER — PROCEDURE VISIT (OUTPATIENT)
Dept: OBSTETRICS AND GYNECOLOGY | Facility: CLINIC | Age: 22
End: 2017-08-15
Payer: MEDICAID

## 2017-08-15 ENCOUNTER — INITIAL PRENATAL (OUTPATIENT)
Dept: OBSTETRICS AND GYNECOLOGY | Facility: CLINIC | Age: 22
End: 2017-08-15
Payer: MEDICAID

## 2017-08-15 VITALS
BODY MASS INDEX: 41.21 KG/M2 | WEIGHT: 225.31 LBS | DIASTOLIC BLOOD PRESSURE: 78 MMHG | SYSTOLIC BLOOD PRESSURE: 124 MMHG

## 2017-08-15 DIAGNOSIS — Z34.80 SUPERVISION OF OTHER NORMAL PREGNANCY: Primary | ICD-10-CM

## 2017-08-15 DIAGNOSIS — O26.849 UTERINE SIZE DATE DISCREPANCY, UNSPECIFIED TRIMESTER: ICD-10-CM

## 2017-08-15 PROCEDURE — 3008F BODY MASS INDEX DOCD: CPT | Mod: ,,, | Performed by: ADVANCED PRACTICE MIDWIFE

## 2017-08-15 PROCEDURE — 99999 PR PBB SHADOW E&M-EST. PATIENT-LVL II: CPT | Mod: PBBFAC,,, | Performed by: ADVANCED PRACTICE MIDWIFE

## 2017-08-15 PROCEDURE — 76801 OB US < 14 WKS SINGLE FETUS: CPT | Mod: 26,S$PBB,, | Performed by: OBSTETRICS & GYNECOLOGY

## 2017-08-15 PROCEDURE — 99213 OFFICE O/P EST LOW 20 MIN: CPT | Mod: TH,S$PBB,, | Performed by: ADVANCED PRACTICE MIDWIFE

## 2017-08-15 PROCEDURE — 99212 OFFICE O/P EST SF 10 MIN: CPT | Mod: PBBFAC,25 | Performed by: ADVANCED PRACTICE MIDWIFE

## 2017-08-15 PROCEDURE — 76801 OB US < 14 WKS SINGLE FETUS: CPT | Mod: PBBFAC | Performed by: OBSTETRICS & GYNECOLOGY

## 2017-08-15 NOTE — PROGRESS NOTES
Doing well; U/S today not consistent with LMP, HECTOR adjusted and pt aware  Pt here for new ob visit  Oriented to the practice including CNM/MD collaboration  Reviewed labs  Introduced to Coffective ofelia  Blue bag materials reviewed  Warning signs discussed.

## 2017-09-12 ENCOUNTER — ROUTINE PRENATAL (OUTPATIENT)
Dept: OBSTETRICS AND GYNECOLOGY | Facility: CLINIC | Age: 22
End: 2017-09-12
Payer: MEDICAID

## 2017-09-12 VITALS
WEIGHT: 222.25 LBS | BODY MASS INDEX: 40.65 KG/M2 | DIASTOLIC BLOOD PRESSURE: 74 MMHG | SYSTOLIC BLOOD PRESSURE: 120 MMHG

## 2017-09-12 DIAGNOSIS — O99.213 OBESITY IN PREGNANCY, ANTEPARTUM, THIRD TRIMESTER: ICD-10-CM

## 2017-09-12 DIAGNOSIS — R30.0 DYSURIA: ICD-10-CM

## 2017-09-12 DIAGNOSIS — Z87.19 PERSONAL HISTORY OF GALLSTONES: ICD-10-CM

## 2017-09-12 DIAGNOSIS — Z34.80 SUPERVISION OF OTHER NORMAL PREGNANCY: Primary | ICD-10-CM

## 2017-09-12 PROBLEM — O99.210 OBESITY IN PREGNANCY, ANTEPARTUM: Status: ACTIVE | Noted: 2017-09-12

## 2017-09-12 PROCEDURE — 87086 URINE CULTURE/COLONY COUNT: CPT

## 2017-09-12 PROCEDURE — 99213 OFFICE O/P EST LOW 20 MIN: CPT | Mod: TH,S$PBB,, | Performed by: ADVANCED PRACTICE MIDWIFE

## 2017-09-12 PROCEDURE — 3008F BODY MASS INDEX DOCD: CPT | Mod: ,,, | Performed by: ADVANCED PRACTICE MIDWIFE

## 2017-09-12 PROCEDURE — 87088 URINE BACTERIA CULTURE: CPT

## 2017-09-12 PROCEDURE — 99213 OFFICE O/P EST LOW 20 MIN: CPT | Mod: PBBFAC | Performed by: ADVANCED PRACTICE MIDWIFE

## 2017-09-12 PROCEDURE — 99999 PR PBB SHADOW E&M-EST. PATIENT-LVL III: CPT | Mod: PBBFAC,,, | Performed by: ADVANCED PRACTICE MIDWIFE

## 2017-09-12 NOTE — PROGRESS NOTES
C/o epigastric pain, discussed diet, avoiding fatty foods, hx of gallstones, not followed up on after last baby as recommended. US ordered. QMS next ov. Pt also c/o urgency will send urine culture. al

## 2017-09-14 ENCOUNTER — TELEPHONE (OUTPATIENT)
Dept: OBSTETRICS AND GYNECOLOGY | Facility: CLINIC | Age: 22
End: 2017-09-14

## 2017-09-14 ENCOUNTER — TELEPHONE (OUTPATIENT)
Dept: RADIOLOGY | Facility: HOSPITAL | Age: 22
End: 2017-09-14

## 2017-09-14 LAB — BACTERIA UR CULT: NORMAL

## 2017-09-14 RX ORDER — NITROFURANTOIN 25; 75 MG/1; MG/1
100 CAPSULE ORAL 2 TIMES DAILY
Qty: 14 CAPSULE | Refills: 0 | Status: SHIPPED | OUTPATIENT
Start: 2017-09-14 | End: 2017-09-21

## 2017-09-22 ENCOUNTER — TELEPHONE (OUTPATIENT)
Dept: RADIOLOGY | Facility: HOSPITAL | Age: 22
End: 2017-09-22

## 2017-09-25 ENCOUNTER — TELEPHONE (OUTPATIENT)
Dept: OBSTETRICS AND GYNECOLOGY | Facility: CLINIC | Age: 22
End: 2017-09-25

## 2017-09-25 ENCOUNTER — HOSPITAL ENCOUNTER (OUTPATIENT)
Dept: RADIOLOGY | Facility: HOSPITAL | Age: 22
Discharge: HOME | End: 2017-09-25
Attending: ADVANCED PRACTICE MIDWIFE
Payer: MEDICAID

## 2017-09-25 DIAGNOSIS — Z87.19 PERSONAL HISTORY OF GALLSTONES: ICD-10-CM

## 2017-09-25 PROCEDURE — 76700 US EXAM ABDOM COMPLETE: CPT | Mod: 26,,, | Performed by: RADIOLOGY

## 2017-09-25 PROCEDURE — 76700 US EXAM ABDOM COMPLETE: CPT | Mod: TC

## 2017-09-25 NOTE — TELEPHONE ENCOUNTER
Number is not reachable. Attempted to call pt- US of abdomen shows   1.  Cholelithiasis without evidence to suggest cholecystitis.    2.  Splenomegaly   will inform next OV. al

## 2017-10-04 ENCOUNTER — TELEPHONE (OUTPATIENT)
Dept: OBSTETRICS AND GYNECOLOGY | Facility: CLINIC | Age: 22
End: 2017-10-04

## 2017-10-04 NOTE — TELEPHONE ENCOUNTER
----- Message from Alen Al sent at 10/4/2017  3:34 PM CDT -----  Contact: pt  Pt calling in regards to a miss call [phone] (vpap). [place]

## 2017-10-04 NOTE — TELEPHONE ENCOUNTER
----- Message from Portia Paige sent at 10/4/2017  3:11 PM CDT -----  Pt at [phone]//states she received a letter in the mail to call your office regarding the results of her Ultrasound//please call back at phone number listed in this message///frances/jourdan

## 2017-10-10 ENCOUNTER — LAB VISIT (OUTPATIENT)
Dept: LAB | Facility: HOSPITAL | Age: 22
End: 2017-10-10
Attending: ADVANCED PRACTICE MIDWIFE
Payer: MEDICAID

## 2017-10-10 ENCOUNTER — ROUTINE PRENATAL (OUTPATIENT)
Dept: OBSTETRICS AND GYNECOLOGY | Facility: CLINIC | Age: 22
End: 2017-10-10
Payer: MEDICAID

## 2017-10-10 VITALS
BODY MASS INDEX: 40.77 KG/M2 | WEIGHT: 222.88 LBS | DIASTOLIC BLOOD PRESSURE: 60 MMHG | SYSTOLIC BLOOD PRESSURE: 102 MMHG

## 2017-10-10 DIAGNOSIS — Z34.80 ENCOUNTER FOR SUPERVISION OF OTHER NORMAL PREGNANCY: ICD-10-CM

## 2017-10-10 DIAGNOSIS — Z36.89 ENCOUNTER FOR FETAL ANATOMIC SURVEY: Primary | ICD-10-CM

## 2017-10-10 DIAGNOSIS — K80.20 GALLSTONES: ICD-10-CM

## 2017-10-10 DIAGNOSIS — Z34.82 ENCOUNTER FOR SUPERVISION OF OTHER NORMAL PREGNANCY IN SECOND TRIMESTER: ICD-10-CM

## 2017-10-10 PROCEDURE — 81511 FTL CGEN ABNOR FOUR ANAL: CPT

## 2017-10-10 PROCEDURE — 99212 OFFICE O/P EST SF 10 MIN: CPT | Mod: PBBFAC | Performed by: ADVANCED PRACTICE MIDWIFE

## 2017-10-10 PROCEDURE — 99999 PR PBB SHADOW E&M-EST. PATIENT-LVL II: CPT | Mod: PBBFAC,,, | Performed by: ADVANCED PRACTICE MIDWIFE

## 2017-10-10 PROCEDURE — 36415 COLL VENOUS BLD VENIPUNCTURE: CPT

## 2017-10-10 PROCEDURE — 99213 OFFICE O/P EST LOW 20 MIN: CPT | Mod: TH,S$PBB,, | Performed by: ADVANCED PRACTICE MIDWIFE

## 2017-10-10 NOTE — PROGRESS NOTES
Discussed US findings, + gallstones, pt denies any s/s presently. rec pt check with insurance about surgeons on her plan. Exp consult during pregnancy, can remove after unless recurrent problems occur. Low fat diet recommended. Anatomy scan ordered. QMS today. al

## 2017-10-12 LAB
# FETUSES US: NORMAL
2ND TRIMESTER 4 SCREEN PNL SERPL: NEGATIVE
2ND TRIMESTER 4 SCREEN SERPL-IMP: NORMAL
AFP MOM SERPL: 0.6
AFP SERPL-MCNC: 20.5 NG/ML
AGE AT DELIVERY: 21
B-HCG MOM SERPL: 1.23
B-HCG SERPL-ACNC: 21.6 IU/ML
FET TS 21 RISK FROM MAT AGE: NORMAL
GA (DAYS): 3 D
GA (WEEKS): 18 WK
GA METHOD: NORMAL
IDDM PATIENT QL: NORMAL
INHIBIN A MOM SERPL: 0.85
INHIBIN A SERPL-MCNC: 118.8 PG/ML
SMOKING STATUS FTND: NO
TS 18 RISK FETUS: NORMAL
TS 21 RISK FETUS: NORMAL
U ESTRIOL MOM SERPL: 0.65
U ESTRIOL SERPL-MCNC: 0.84 NG/ML

## 2017-10-31 ENCOUNTER — PROCEDURE VISIT (OUTPATIENT)
Dept: OBSTETRICS AND GYNECOLOGY | Facility: CLINIC | Age: 22
End: 2017-10-31
Payer: MEDICAID

## 2017-10-31 ENCOUNTER — ROUTINE PRENATAL (OUTPATIENT)
Dept: OBSTETRICS AND GYNECOLOGY | Facility: CLINIC | Age: 22
End: 2017-10-31
Payer: MEDICAID

## 2017-10-31 VITALS
SYSTOLIC BLOOD PRESSURE: 110 MMHG | BODY MASS INDEX: 41.49 KG/M2 | WEIGHT: 226.88 LBS | DIASTOLIC BLOOD PRESSURE: 64 MMHG

## 2017-10-31 DIAGNOSIS — Z36.89 ENCOUNTER FOR FETAL ANATOMIC SURVEY: ICD-10-CM

## 2017-10-31 DIAGNOSIS — O99.210 OBESITY IN PREGNANCY, ANTEPARTUM: ICD-10-CM

## 2017-10-31 PROCEDURE — 99213 OFFICE O/P EST LOW 20 MIN: CPT | Mod: TH,S$PBB,, | Performed by: ADVANCED PRACTICE MIDWIFE

## 2017-10-31 PROCEDURE — 76805 OB US >/= 14 WKS SNGL FETUS: CPT | Mod: 26,S$PBB,, | Performed by: OBSTETRICS & GYNECOLOGY

## 2017-10-31 PROCEDURE — 99999 PR PBB SHADOW E&M-EST. PATIENT-LVL II: CPT | Mod: PBBFAC,,, | Performed by: ADVANCED PRACTICE MIDWIFE

## 2017-10-31 PROCEDURE — 76805 OB US >/= 14 WKS SNGL FETUS: CPT | Mod: PBBFAC | Performed by: OBSTETRICS & GYNECOLOGY

## 2017-10-31 PROCEDURE — 99212 OFFICE O/P EST SF 10 MIN: CPT | Mod: PBBFAC,25 | Performed by: ADVANCED PRACTICE MIDWIFE

## 2017-11-06 NOTE — PROGRESS NOTES
US today subopt spine, f/u ordered. pt denies c/o. Coffective counseling sheet Get Ready discussed with mother. Reinforced avoiding induction of labor unless medically indicated as well as comfort measures during labor.  Encouraged mother to download Coffective mobile ofelia if she has not already done so. Mother verbalizes understanding.al

## 2017-11-28 ENCOUNTER — PROCEDURE VISIT (OUTPATIENT)
Dept: OBSTETRICS AND GYNECOLOGY | Facility: CLINIC | Age: 22
End: 2017-11-28
Payer: MEDICAID

## 2017-11-28 ENCOUNTER — ROUTINE PRENATAL (OUTPATIENT)
Dept: OBSTETRICS AND GYNECOLOGY | Facility: CLINIC | Age: 22
End: 2017-11-28
Payer: MEDICAID

## 2017-11-28 VITALS
BODY MASS INDEX: 41.81 KG/M2 | SYSTOLIC BLOOD PRESSURE: 118 MMHG | WEIGHT: 228.63 LBS | DIASTOLIC BLOOD PRESSURE: 78 MMHG

## 2017-11-28 DIAGNOSIS — O99.210 OBESITY AFFECTING PREGNANCY, ANTEPARTUM: Primary | ICD-10-CM

## 2017-11-28 PROCEDURE — 99213 OFFICE O/P EST LOW 20 MIN: CPT | Mod: TH,S$PBB,, | Performed by: ADVANCED PRACTICE MIDWIFE

## 2017-11-28 PROCEDURE — 99212 OFFICE O/P EST SF 10 MIN: CPT | Mod: PBBFAC | Performed by: ADVANCED PRACTICE MIDWIFE

## 2017-11-28 PROCEDURE — 76815 OB US LIMITED FETUS(S): CPT | Mod: PBBFAC | Performed by: OBSTETRICS & GYNECOLOGY

## 2017-11-28 PROCEDURE — 99999 PR PBB SHADOW E&M-EST. PATIENT-LVL II: CPT | Mod: PBBFAC,,, | Performed by: ADVANCED PRACTICE MIDWIFE

## 2017-11-28 PROCEDURE — 76815 OB US LIMITED FETUS(S): CPT | Mod: 26,S$PBB,, | Performed by: OBSTETRICS & GYNECOLOGY

## 2017-11-28 NOTE — PROGRESS NOTES
Doing well, US today normal spine, anatomy complete. Labs next OV. Declines flu shot, tdap handout provided. Planning to breastfeed. Coffective counseling sheet Fall In Love discussed with mother. Reinforced immediate skin to skin, the magic first hour, importance of the first feeding and delaying routine procedures. Encouraged mother to download Coffective mobile ofelia if she has not already done so. Mother verbalizes understanding.    Had issues nursing last baby bc baby was taken away from mom for 6 hours!!! al

## 2017-12-28 ENCOUNTER — ROUTINE PRENATAL (OUTPATIENT)
Dept: OBSTETRICS AND GYNECOLOGY | Facility: CLINIC | Age: 22
End: 2017-12-28
Payer: MEDICAID

## 2017-12-28 ENCOUNTER — LAB VISIT (OUTPATIENT)
Dept: LAB | Facility: HOSPITAL | Age: 22
End: 2017-12-28
Attending: ADVANCED PRACTICE MIDWIFE
Payer: MEDICAID

## 2017-12-28 VITALS — WEIGHT: 231.06 LBS | DIASTOLIC BLOOD PRESSURE: 70 MMHG | BODY MASS INDEX: 42.26 KG/M2 | SYSTOLIC BLOOD PRESSURE: 92 MMHG

## 2017-12-28 DIAGNOSIS — Z34.83 ENCOUNTER FOR SUPERVISION OF OTHER NORMAL PREGNANCY IN THIRD TRIMESTER: Primary | ICD-10-CM

## 2017-12-28 DIAGNOSIS — Z23 NEED FOR TDAP VACCINATION: ICD-10-CM

## 2017-12-28 DIAGNOSIS — O99.210 OBESITY AFFECTING PREGNANCY, ANTEPARTUM: ICD-10-CM

## 2017-12-28 LAB
BASOPHILS # BLD AUTO: 0.02 K/UL
BASOPHILS NFR BLD: 0.1 %
DIFFERENTIAL METHOD: ABNORMAL
EOSINOPHIL # BLD AUTO: 0.3 K/UL
EOSINOPHIL NFR BLD: 2.5 %
ERYTHROCYTE [DISTWIDTH] IN BLOOD BY AUTOMATED COUNT: 13.7 %
GLUCOSE SERPL-MCNC: 132 MG/DL
HCT VFR BLD AUTO: 32.3 %
HGB BLD-MCNC: 9.9 G/DL
IMM GRANULOCYTES # BLD AUTO: 0.24 K/UL
IMM GRANULOCYTES NFR BLD AUTO: 1.8 %
LYMPHOCYTES # BLD AUTO: 2.3 K/UL
LYMPHOCYTES NFR BLD: 16.5 %
MCH RBC QN AUTO: 27.7 PG
MCHC RBC AUTO-ENTMCNC: 30.7 G/DL
MCV RBC AUTO: 91 FL
MONOCYTES # BLD AUTO: 0.8 K/UL
MONOCYTES NFR BLD: 5.5 %
NEUTROPHILS # BLD AUTO: 10.1 K/UL
NEUTROPHILS NFR BLD: 73.6 %
NRBC BLD-RTO: 0 /100 WBC
PLATELET # BLD AUTO: 281 K/UL
PMV BLD AUTO: 10.4 FL
RBC # BLD AUTO: 3.57 M/UL
WBC # BLD AUTO: 13.68 K/UL

## 2017-12-28 PROCEDURE — 82950 GLUCOSE TEST: CPT

## 2017-12-28 PROCEDURE — 99213 OFFICE O/P EST LOW 20 MIN: CPT | Mod: TH,S$PBB,, | Performed by: ADVANCED PRACTICE MIDWIFE

## 2017-12-28 PROCEDURE — 36415 COLL VENOUS BLD VENIPUNCTURE: CPT

## 2017-12-28 PROCEDURE — 90471 IMMUNIZATION ADMIN: CPT | Mod: PBBFAC

## 2017-12-28 PROCEDURE — 99212 OFFICE O/P EST SF 10 MIN: CPT | Mod: PBBFAC,25 | Performed by: ADVANCED PRACTICE MIDWIFE

## 2017-12-28 PROCEDURE — 85025 COMPLETE CBC W/AUTO DIFF WBC: CPT

## 2017-12-28 PROCEDURE — 86592 SYPHILIS TEST NON-TREP QUAL: CPT

## 2017-12-28 PROCEDURE — 90715 TDAP VACCINE 7 YRS/> IM: CPT | Mod: PBBFAC

## 2017-12-28 PROCEDURE — 99999 PR PBB SHADOW E&M-EST. PATIENT-LVL II: CPT | Mod: PBBFAC,,, | Performed by: ADVANCED PRACTICE MIDWIFE

## 2017-12-28 PROCEDURE — 86703 HIV-1/HIV-2 1 RESULT ANTBDY: CPT

## 2017-12-29 ENCOUNTER — TELEPHONE (OUTPATIENT)
Dept: OBSTETRICS AND GYNECOLOGY | Facility: CLINIC | Age: 22
End: 2017-12-29

## 2017-12-29 LAB
HIV 1+2 AB+HIV1 P24 AG SERPL QL IA: NEGATIVE
RPR SER QL: NORMAL

## 2017-12-29 RX ORDER — FERROUS SULFATE 325(65) MG
325 TABLET ORAL DAILY
Qty: 30 TABLET | Refills: 3 | Status: SHIPPED | OUTPATIENT
Start: 2017-12-29 | End: 2018-01-19 | Stop reason: SDUPTHER

## 2017-12-29 NOTE — TELEPHONE ENCOUNTER
Please tell pt I sent iron to her pharmacy. She needs to take her PNV and an iron tablet daily. She passed her glucose test. Lilia

## 2018-01-19 ENCOUNTER — ROUTINE PRENATAL (OUTPATIENT)
Dept: OBSTETRICS AND GYNECOLOGY | Facility: CLINIC | Age: 23
End: 2018-01-19
Payer: MEDICAID

## 2018-01-19 ENCOUNTER — PROCEDURE VISIT (OUTPATIENT)
Dept: OBSTETRICS AND GYNECOLOGY | Facility: CLINIC | Age: 23
End: 2018-01-19
Payer: MEDICAID

## 2018-01-19 ENCOUNTER — TELEPHONE (OUTPATIENT)
Dept: OBSTETRICS AND GYNECOLOGY | Facility: CLINIC | Age: 23
End: 2018-01-19

## 2018-01-19 VITALS — WEIGHT: 232.38 LBS | DIASTOLIC BLOOD PRESSURE: 64 MMHG | SYSTOLIC BLOOD PRESSURE: 108 MMHG | BODY MASS INDEX: 42.5 KG/M2

## 2018-01-19 DIAGNOSIS — Z34.83 ENCOUNTER FOR SUPERVISION OF OTHER NORMAL PREGNANCY IN THIRD TRIMESTER: Primary | ICD-10-CM

## 2018-01-19 DIAGNOSIS — O99.210 OBESITY AFFECTING PREGNANCY, ANTEPARTUM: ICD-10-CM

## 2018-01-19 DIAGNOSIS — Z3A.31 31 WEEKS GESTATION OF PREGNANCY: ICD-10-CM

## 2018-01-19 PROBLEM — Z34.93 ENCOUNTER FOR SUPERVISION OF NORMAL PREGNANCY IN THIRD TRIMESTER: Status: ACTIVE | Noted: 2017-08-15

## 2018-01-19 PROCEDURE — 76816 OB US FOLLOW-UP PER FETUS: CPT | Mod: PBBFAC | Performed by: OBSTETRICS & GYNECOLOGY

## 2018-01-19 PROCEDURE — 99213 OFFICE O/P EST LOW 20 MIN: CPT | Mod: PBBFAC,TH,25 | Performed by: ADVANCED PRACTICE MIDWIFE

## 2018-01-19 PROCEDURE — 99999 PR PBB SHADOW E&M-EST. PATIENT-LVL III: CPT | Mod: PBBFAC,,, | Performed by: ADVANCED PRACTICE MIDWIFE

## 2018-01-19 PROCEDURE — 76816 OB US FOLLOW-UP PER FETUS: CPT | Mod: 26,S$PBB,, | Performed by: OBSTETRICS & GYNECOLOGY

## 2018-01-19 PROCEDURE — 76819 FETAL BIOPHYS PROFIL W/O NST: CPT | Mod: PBBFAC | Performed by: OBSTETRICS & GYNECOLOGY

## 2018-01-19 PROCEDURE — 99213 OFFICE O/P EST LOW 20 MIN: CPT | Mod: TH,S$PBB,, | Performed by: ADVANCED PRACTICE MIDWIFE

## 2018-01-19 PROCEDURE — 76819 FETAL BIOPHYS PROFIL W/O NST: CPT | Mod: 26,S$PBB,, | Performed by: OBSTETRICS & GYNECOLOGY

## 2018-01-19 RX ORDER — FERROUS SULFATE 325(65) MG
325 TABLET ORAL DAILY
Qty: 30 TABLET | Refills: 3 | Status: SHIPPED | OUTPATIENT
Start: 2018-01-19 | End: 2018-03-13

## 2018-01-19 NOTE — TELEPHONE ENCOUNTER
----- Message from Alen Al sent at 1/18/2018  3:39 PM CST -----  Contact: pt  She's calling in regards to rescheduling her appt, [phone] (cell)

## 2018-01-19 NOTE — TELEPHONE ENCOUNTER
Appt r/s for today at 2pm at [place] location, per pt request.  Pt voiced understanding.  SERGEY ROSE

## 2018-01-19 NOTE — PROGRESS NOTES
Pt wants to go natural, went natural with last birth, discussed making birth plan   PTL precautions and FKCs reviewed   Ultrasound today with EFW 29%, 3lb 14oz, MVP 5.6cm, KRUPA 15.9cm, reviewed with pt     Coffective counseling sheet Protect Breastfeeding discussed with mother. Reinforced avoidence of artifical nipples and formula, unless medically indicated.  Encouraged mother to download Coffective mobile ofelia if she has not already done so. Mother verbalizes understanding.

## 2018-01-30 ENCOUNTER — ROUTINE PRENATAL (OUTPATIENT)
Dept: OBSTETRICS AND GYNECOLOGY | Facility: CLINIC | Age: 23
End: 2018-01-30
Payer: MEDICAID

## 2018-01-30 VITALS
DIASTOLIC BLOOD PRESSURE: 60 MMHG | WEIGHT: 231.06 LBS | BODY MASS INDEX: 42.26 KG/M2 | SYSTOLIC BLOOD PRESSURE: 102 MMHG

## 2018-01-30 DIAGNOSIS — Z34.83 ENCOUNTER FOR SUPERVISION OF OTHER NORMAL PREGNANCY IN THIRD TRIMESTER: Primary | ICD-10-CM

## 2018-01-30 DIAGNOSIS — O99.210 OBESITY AFFECTING PREGNANCY, ANTEPARTUM: ICD-10-CM

## 2018-01-30 PROCEDURE — 3008F BODY MASS INDEX DOCD: CPT | Mod: ,,, | Performed by: ADVANCED PRACTICE MIDWIFE

## 2018-01-30 PROCEDURE — 99213 OFFICE O/P EST LOW 20 MIN: CPT | Mod: TH,S$PBB,, | Performed by: ADVANCED PRACTICE MIDWIFE

## 2018-01-30 PROCEDURE — 99212 OFFICE O/P EST SF 10 MIN: CPT | Mod: PBBFAC,TH | Performed by: ADVANCED PRACTICE MIDWIFE

## 2018-01-30 PROCEDURE — 99999 PR PBB SHADOW E&M-EST. PATIENT-LVL II: CPT | Mod: PBBFAC,,, | Performed by: ADVANCED PRACTICE MIDWIFE

## 2018-01-30 NOTE — PROGRESS NOTES
Doing well, no c/o. Good FM, no PTL s/s. US for growth next OV for growth, BMI. GBS handout provided and explained. Planning to formula feed. al

## 2018-02-07 ENCOUNTER — TELEPHONE (OUTPATIENT)
Dept: OBSTETRICS AND GYNECOLOGY | Facility: CLINIC | Age: 23
End: 2018-02-07

## 2018-02-07 NOTE — TELEPHONE ENCOUNTER
----- Message from Dior Hearn sent at 2/7/2018  1:10 PM CST -----  Contact: pt  Pt calling in regards to wanting to speak with the nurse regarding a bladder or yeast infection possibly and would like a callback at .[phone]

## 2018-02-10 ENCOUNTER — HOSPITAL ENCOUNTER (OUTPATIENT)
Facility: HOSPITAL | Age: 23
Discharge: HOME | End: 2018-02-10
Attending: OBSTETRICS & GYNECOLOGY | Admitting: OBSTETRICS & GYNECOLOGY
Payer: MEDICAID

## 2018-02-10 ENCOUNTER — NURSE TRIAGE (OUTPATIENT)
Dept: ADMINISTRATIVE | Facility: CLINIC | Age: 23
End: 2018-02-10

## 2018-02-10 DIAGNOSIS — O47.9 UTERINE CONTRACTIONS DURING PREGNANCY: ICD-10-CM

## 2018-02-10 PROBLEM — O99.013 ANEMIA DURING PREGNANCY IN THIRD TRIMESTER: Status: ACTIVE | Noted: 2018-02-10

## 2018-02-10 PROBLEM — O23.43 URINARY TRACT INFECTION IN MOTHER DURING THIRD TRIMESTER OF PREGNANCY: Status: ACTIVE | Noted: 2018-02-10

## 2018-02-10 PROBLEM — N89.8 VAGINAL LESION: Status: ACTIVE | Noted: 2018-02-10

## 2018-02-10 LAB
BACTERIA #/AREA URNS HPF: ABNORMAL /HPF
BILIRUB UR QL STRIP: NEGATIVE
CLARITY UR: CLEAR
COLOR UR: YELLOW
GLUCOSE UR QL STRIP: ABNORMAL
HGB UR QL STRIP: ABNORMAL
KETONES UR QL STRIP: NEGATIVE
LEUKOCYTE ESTERASE UR QL STRIP: ABNORMAL
MICROSCOPIC COMMENT: ABNORMAL
NITRITE UR QL STRIP: POSITIVE
PH UR STRIP: 6 [PH] (ref 5–8)
PROT UR QL STRIP: NEGATIVE
RBC #/AREA URNS HPF: 0 /HPF (ref 0–4)
SP GR UR STRIP: 1.02 (ref 1–1.03)
SQUAMOUS #/AREA URNS HPF: 3 /HPF
URN SPEC COLLECT METH UR: ABNORMAL
UROBILINOGEN UR STRIP-ACNC: 1 EU/DL
WBC #/AREA URNS HPF: 12 /HPF (ref 0–5)

## 2018-02-10 PROCEDURE — 81000 URINALYSIS NONAUTO W/SCOPE: CPT

## 2018-02-10 PROCEDURE — G0378 HOSPITAL OBSERVATION PER HR: HCPCS

## 2018-02-10 PROCEDURE — 87088 URINE BACTERIA CULTURE: CPT

## 2018-02-10 PROCEDURE — 87077 CULTURE AEROBIC IDENTIFY: CPT

## 2018-02-10 PROCEDURE — 59025 FETAL NON-STRESS TEST: CPT

## 2018-02-10 PROCEDURE — 59025 FETAL NON-STRESS TEST: CPT | Mod: 26,,, | Performed by: ADVANCED PRACTICE MIDWIFE

## 2018-02-10 PROCEDURE — 87529 HSV DNA AMP PROBE: CPT

## 2018-02-10 PROCEDURE — 99213 OFFICE O/P EST LOW 20 MIN: CPT | Mod: TH,25,, | Performed by: ADVANCED PRACTICE MIDWIFE

## 2018-02-10 PROCEDURE — 87086 URINE CULTURE/COLONY COUNT: CPT

## 2018-02-10 PROCEDURE — 87186 SC STD MICRODIL/AGAR DIL: CPT

## 2018-02-10 PROCEDURE — 99211 OFF/OP EST MAY X REQ PHY/QHP: CPT | Mod: 25,TH

## 2018-02-10 RX ORDER — ACETAMINOPHEN 500 MG
500 TABLET ORAL EVERY 6 HOURS PRN
Status: DISCONTINUED | OUTPATIENT
Start: 2018-02-10 | End: 2018-02-11 | Stop reason: HOSPADM

## 2018-02-10 RX ORDER — METRONIDAZOLE 500 MG/1
500 TABLET ORAL 2 TIMES DAILY
Qty: 14 TABLET | Refills: 0 | Status: SHIPPED | OUTPATIENT
Start: 2018-02-10 | End: 2018-02-17

## 2018-02-10 RX ORDER — FERROUS SULFATE 325(65) MG
325 TABLET ORAL
Refills: 0 | COMMUNITY
Start: 2018-02-10 | End: 2018-02-13 | Stop reason: SDUPTHER

## 2018-02-10 RX ORDER — ONDANSETRON 8 MG/1
8 TABLET, ORALLY DISINTEGRATING ORAL EVERY 8 HOURS PRN
Status: DISCONTINUED | OUTPATIENT
Start: 2018-02-10 | End: 2018-02-11 | Stop reason: HOSPADM

## 2018-02-10 RX ORDER — NITROFURANTOIN 25; 75 MG/1; MG/1
100 CAPSULE ORAL 2 TIMES DAILY
Qty: 14 CAPSULE | Refills: 0 | Status: SHIPPED | OUTPATIENT
Start: 2018-02-10 | End: 2018-02-17

## 2018-02-10 NOTE — TELEPHONE ENCOUNTER
"Patient stated that she has abdominal pain that makes her cry. Pains are intermittent but occur at timed intervals consistent with contractions. Reported that she is 35 weeks but baby appears 38 weeks. Last time this pain occurred she was in labor with the last child. Advised to go to the ED per protocol and she verbalized understanding. Instructed to call back with any additional problems and/or concerns  Reason for Disposition   MODERATE-SEVERE abdominal pain (e.g., interferes with normal activities, awakens from sleep)    Answer Assessment - Initial Assessment Questions  1. LOCATION: "Where does it hurt?"      Pelvic area on the right side  2. RADIATION: "Does the pain shoot anywhere else?" (e.g., chest, back)      No  3. ONSET: "When did the pain begin?" (Minutes, hours or days ago)       Around 1600 today  4. ONSET: "Gradual or sudden onset?"      Sudden onset  5. PATTERN: "Does the pain come and go, or has it been constant since it started?"       Intermittent   6. SEVERITY: "How bad is the pain?" "What does it keep you from doing?"  (e.g., Scale 1-10; mild, moderate, or severe)    - MILD (1-3): doesn't interfere with normal activities, abdomen soft and not tender to touch     - MODERATE (4-7): interferes with normal activities or awakens from sleep, tender to touch     - SEVERE (8-10): excruciating pain, doubled over, unable to do any normal activities      Makes the patient cry  7. RECURRENT SYMPTOM: "Have you ever had this type of abdominal pain before?" If so, ask: "When was the last time?" and "What happened that time?"       With previous pregnancy  8. CAUSE: "What do you think is causing the abdominal pain?      Possible labor   9. RELIEVING/AGGRAVATING FACTORS: "What makes it better or worse?" (e.g., antacids, bowel movement, movement)      no  10. OTHER SYMPTOMS: "Has there been any vaginal bleeding, fever, vomiting, diarrhea, or urine problems?"        Possible UTI  11. HECTOR: "What date are you " "expecting to deliver?"        3/20    Protocols used: ST PREGNANCY - ABDOMINAL PAIN GREATER THAN 20 WEEKS EG-A-      "

## 2018-02-11 NOTE — H&P
[facility] -   Obstetrics  History & Physical    Patient Name: Padmini Wren  MRN: [id number]  Admission Date: 2/10/2018  Primary Care Provider: Taryn Salcido MD    Subjective:     Principal Problem:Uterine contractions during pregnancy    History of Present Illness:  C/o contractions since 5pm, vaginal discharge, and vaginal lesion    Obstetric HPI:  Patient reports Intensity: mild contractions, active fetal movement, No vaginal bleeding , No loss of fluid     This pregnancy has been complicated by   Gallstones     Obstetric History       T1      L1     SAB0   TAB0   Ectopic0   Multiple0   Live Births1       # Outcome Date GA Lbr Paulino/2nd Weight Sex Delivery Anes PTL Lv   2 Current            1 Term 16   3.6 kg (7 lb 15 oz) M Vag-Spont None  JOJO        Past Medical History:   Diagnosis Date    Anxiety     Arthritis     Asthma     JRA (juvenile rheumatoid arthritis)     Seizures      Past Surgical History:   Procedure Laterality Date    ANKLE FRACTURE SURGERY      CYST REMOVAL      tubes ears         PTA Medications   Medication Sig    ferrous sulfate 325 mg (65 mg iron) Tab tablet Take 1 tablet (325 mg total) by mouth once daily.       Review of patient's allergies indicates:  No Known Allergies     Family History     Problem Relation (Age of Onset)    Asthma Mother, Brother, Brother    Inflammatory bowel disease Mother        Social History Main Topics    Smoking status: Never Smoker    Smokeless tobacco: Never Used    Alcohol use Yes    Drug use: No    Sexual activity: Yes     Birth control/ protection: None     Review of Systems   Gastrointestinal: Positive for abdominal pain.   Genitourinary: Positive for dysuria and vaginal discharge.   All other systems reviewed and are negative.     Objective:     Vital Signs (Most Recent):    Vital Signs (24h Range):           There is no height or weight on file to calculate BMI.    FHT: 140 Cat 1  (reassuring)  TOCO:  Irregualar    Physical Exam:   Constitutional: She is oriented to person, place, and time. Vital signs are normal. She appears well-developed and well-nourished. She is cooperative.    HENT:   Head: Normocephalic and atraumatic.       Pulmonary/Chest: Effort normal.        Abdominal: Soft.   Gravid, non-tender     Genitourinary: Vagina normal and uterus normal. Pelvic exam was performed with patient supine. Cervix is normal. Labial bartholins normal.          Musculoskeletal: Normal range of motion and moves all extremeties.       Neurological: She is alert and oriented to person, place, and time. She has normal strength.    Skin: Skin is warm, dry and intact. Capillary refill takes less than 2 seconds.    Psychiatric: She has a normal mood and affect. Her speech is normal and behavior is normal. Judgment and thought content normal. Cognition and memory are normal.     Wet prep + for clue cell, copious amounts of discharge noted   Cervix:  Closed on speculum exam      Significant Labs:  Lab Results   Component Value Date    GROUPTRH A POS 2017    HEPBSAG Negative 2017       I have personallly reviewed all pertinent lab results from the last 24 hours.    Assessment/Plan:     22 y.o. female  at 34w4d for:    * Uterine contractions during pregnancy    Observe   UA sent  HSV culture   Wet prep + clue cells   RNST        Vaginal lesion    HSV culture collected and sent          Anemia during pregnancy in third trimester    Iron daily, not begun yet per pt            Joseph Rogers CNM  Obstetrics  [facility]

## 2018-02-11 NOTE — DISCHARGE SUMMARY
[facility] -   Obstetrics  Discharge Summary      Patient Name: Padmini Wren  MRN: [id number]  Admission Date: 2/10/2018  Hospital Length of Stay: 0 days  Discharge Date and Time:  02/10/2018 10:33 PM  Attending Physician: Franchesca Eisenberg, *   Discharging Provider: Joseph Rogers CNM  Primary Care Provider: Taryn Salcido MD    HPI: C/o contractions since 5pm, vaginal discharge, and vaginal lesion    * No surgery found *     Hospital Course:   Observe   UA  HSV culture   Wet prep  NST  Urine culture        Final Active Diagnoses:    Diagnosis Date Noted POA    PRINCIPAL PROBLEM:  Uterine contractions during pregnancy [O62.2] 02/10/2018 Yes    Anemia during pregnancy in third trimester [O99.013] 02/10/2018 Yes    Vaginal lesion [N89.8] 02/10/2018 Yes    Urinary tract infection in mother during third trimester of pregnancy [O23.43] 02/10/2018 Yes      Problems Resolved During this Admission:    Diagnosis Date Noted Date Resolved POA        Labs: All labs within the past 24 hours have been reviewed      Immunizations     None          This patient has no babies on file.  Pending Diagnostic Studies:     Procedure Component Value Units Date/Time    HSV by Rapid PCR, Non-Blood [facility]; Vagina (Left vaginal lesion) [[id number]] Collected:  02/10/18 2145    Order Status:  Sent Lab Status:  In process Updated:  02/10/18 2154          Discharged Condition: good    Disposition: Home or Self Care    Follow Up:  Follow-up Information     Lilia Gomez CNM.    Specialty:  Obstetrics  Why:  Keep Scheduled appt Tuesday   Contact information:  [address]  [place] [address] [phone]                 Patient Instructions:   No discharge procedures on file.  Medications:  Current Discharge Medication List      START taking these medications    Details   !! ferrous sulfate 325 mg (65 mg iron) Tab tablet Take 1 tablet (325 mg total) by mouth daily with breakfast.  Refills: 0       metroNIDAZOLE (FLAGYL) 500 MG tablet Take 1 tablet (500 mg total) by mouth 2 (two) times daily.  Qty: 14 tablet, Refills: 0      nitrofurantoin, macrocrystal-monohydrate, (MACROBID) 100 MG capsule Take 1 capsule (100 mg total) by mouth 2 (two) times daily.  Qty: 14 capsule, Refills: 0       !! - Potential duplicate medications found. Please discuss with provider.      CONTINUE these medications which have NOT CHANGED    Details   !! ferrous sulfate 325 mg (65 mg iron) Tab tablet Take 1 tablet (325 mg total) by mouth once daily.  Qty: 30 tablet, Refills: 3       !! - Potential duplicate medications found. Please discuss with provider.          Joseph Rogers CNM  Obstetrics  [facility]

## 2018-02-11 NOTE — SUBJECTIVE & OBJECTIVE
Obstetric HPI:  Patient reports Intensity: mild contractions, active fetal movement, No vaginal bleeding , No loss of fluid     This pregnancy has been complicated by   Gallstones     Obstetric History       T1      L1     SAB0   TAB0   Ectopic0   Multiple0   Live Births1       # Outcome Date GA Lbr Paulino/2nd Weight Sex Delivery Anes PTL Lv   2 Current            1 Term 16   3.6 kg (7 lb 15 oz) M Vag-Spont None  JOJO        Past Medical History:   Diagnosis Date    Anxiety     Arthritis     Asthma     JRA (juvenile rheumatoid arthritis)     Seizures      Past Surgical History:   Procedure Laterality Date    ANKLE FRACTURE SURGERY      CYST REMOVAL      tubes ears         PTA Medications   Medication Sig    ferrous sulfate 325 mg (65 mg iron) Tab tablet Take 1 tablet (325 mg total) by mouth once daily.       Review of patient's allergies indicates:  No Known Allergies     Family History     Problem Relation (Age of Onset)    Asthma Mother, Brother, Brother    Inflammatory bowel disease Mother        Social History Main Topics    Smoking status: Never Smoker    Smokeless tobacco: Never Used    Alcohol use Yes    Drug use: No    Sexual activity: Yes     Birth control/ protection: None     Review of Systems   Gastrointestinal: Positive for abdominal pain.   Genitourinary: Positive for dysuria and vaginal discharge.   All other systems reviewed and are negative.     Objective:     Vital Signs (Most Recent):    Vital Signs (24h Range):           There is no height or weight on file to calculate BMI.    FHT: 140 Cat 1 (reassuring)  TOCO:  Irregualar    Physical Exam:   Constitutional: She is oriented to person, place, and time. Vital signs are normal. She appears well-developed and well-nourished. She is cooperative.    HENT:   Head: Normocephalic and atraumatic.       Pulmonary/Chest: Effort normal.        Abdominal: Soft.   Gravid, non-tender     Genitourinary: Vagina normal and uterus  normal. Pelvic exam was performed with patient supine. Cervix is normal. Labial bartholins normal.          Musculoskeletal: Normal range of motion and moves all extremeties.       Neurological: She is alert and oriented to person, place, and time. She has normal strength.    Skin: Skin is warm, dry and intact. Capillary refill takes less than 2 seconds.    Psychiatric: She has a normal mood and affect. Her speech is normal and behavior is normal. Judgment and thought content normal. Cognition and memory are normal.     Wet prep + for clue cell, copious amounts of discharge noted   Cervix:  Closed on speculum exam      Significant Labs:  Lab Results   Component Value Date    [facility] POS 07/28/2017    HEPBSAG Negative 07/28/2017       I have personallly reviewed all pertinent lab results from the last 24 hours.

## 2018-02-11 NOTE — PROCEDURES
Padmini Mahoney Robert Wren is a 22 y.o. female patient.            Obtain Fetal nonstress test (NST)  Date/Time: 2/10/2018 10:01 PM  Performed by: BENI ROGERS.  Authorized by: BENI ROGERS     Nonstress Test:     Variability:  6-25 BPM    Decelerations:  None    Accelerations:  15 bpm    Acoustic Stimulator: No      Baseline:  140    Uterine Irritability: No      Contractions:  Irregular  Biophysical Profile:     Nonstress Test Interpretation: reactive      Overall Impression:  Reassuring  Post-procedure:     Patient tolerance:  Patient tolerated the procedure well with no immediate complications        Beni Rogers  2/10/2018

## 2018-02-13 ENCOUNTER — ROUTINE PRENATAL (OUTPATIENT)
Dept: OBSTETRICS AND GYNECOLOGY | Facility: CLINIC | Age: 23
End: 2018-02-13
Payer: MEDICAID

## 2018-02-13 ENCOUNTER — PROCEDURE VISIT (OUTPATIENT)
Dept: OBSTETRICS AND GYNECOLOGY | Facility: CLINIC | Age: 23
End: 2018-02-13
Payer: MEDICAID

## 2018-02-13 VITALS
BODY MASS INDEX: 42.46 KG/M2 | SYSTOLIC BLOOD PRESSURE: 118 MMHG | WEIGHT: 232.13 LBS | DIASTOLIC BLOOD PRESSURE: 72 MMHG

## 2018-02-13 DIAGNOSIS — O99.013 ANEMIA DURING PREGNANCY IN THIRD TRIMESTER: ICD-10-CM

## 2018-02-13 DIAGNOSIS — O99.210 OBESITY AFFECTING PREGNANCY, ANTEPARTUM: ICD-10-CM

## 2018-02-13 DIAGNOSIS — O99.210 OBESITY AFFECTING PREGNANCY, ANTEPARTUM: Primary | ICD-10-CM

## 2018-02-13 LAB
BACTERIA UR CULT: NORMAL
HSV1 DNA SPEC QL NAA+PROBE: NEGATIVE
HSV2 DNA SPEC QL NAA+PROBE: NEGATIVE
SPECIMEN SOURCE: NORMAL

## 2018-02-13 PROCEDURE — 76819 FETAL BIOPHYS PROFIL W/O NST: CPT | Mod: 26,59,S$PBB, | Performed by: OBSTETRICS & GYNECOLOGY

## 2018-02-13 PROCEDURE — 76815 OB US LIMITED FETUS(S): CPT | Mod: 59,PBBFAC | Performed by: OBSTETRICS & GYNECOLOGY

## 2018-02-13 PROCEDURE — 76815 OB US LIMITED FETUS(S): CPT | Mod: 26,59,S$PBB, | Performed by: OBSTETRICS & GYNECOLOGY

## 2018-02-13 PROCEDURE — 99213 OFFICE O/P EST LOW 20 MIN: CPT | Mod: PBBFAC,TH,25 | Performed by: ADVANCED PRACTICE MIDWIFE

## 2018-02-13 PROCEDURE — 3008F BODY MASS INDEX DOCD: CPT | Mod: ,,, | Performed by: ADVANCED PRACTICE MIDWIFE

## 2018-02-13 PROCEDURE — 99213 OFFICE O/P EST LOW 20 MIN: CPT | Mod: TH,S$PBB,, | Performed by: ADVANCED PRACTICE MIDWIFE

## 2018-02-13 PROCEDURE — 87081 CULTURE SCREEN ONLY: CPT

## 2018-02-13 PROCEDURE — 87147 CULTURE TYPE IMMUNOLOGIC: CPT

## 2018-02-13 PROCEDURE — 87184 SC STD DISK METHOD PER PLATE: CPT

## 2018-02-13 PROCEDURE — 99999 PR PBB SHADOW E&M-EST. PATIENT-LVL III: CPT | Mod: PBBFAC,,, | Performed by: ADVANCED PRACTICE MIDWIFE

## 2018-02-13 PROCEDURE — 76819 FETAL BIOPHYS PROFIL W/O NST: CPT | Mod: 59,PBBFAC | Performed by: OBSTETRICS & GYNECOLOGY

## 2018-02-13 RX ORDER — FERROUS SULFATE 325(65) MG
325 TABLET ORAL
Qty: 30 TABLET | Refills: 3 | Status: SHIPPED | OUTPATIENT
Start: 2018-02-13 | End: 2018-02-23 | Stop reason: SDUPTHER

## 2018-02-17 LAB — BACTERIA SPEC AEROBE CULT: NORMAL

## 2018-02-19 ENCOUNTER — TELEPHONE (OUTPATIENT)
Dept: OBSTETRICS AND GYNECOLOGY | Facility: CLINIC | Age: 23
End: 2018-02-19

## 2018-02-19 NOTE — TELEPHONE ENCOUNTER
Pt informed of GBS results, will be treated when she is admitted in labor.  Pt voiced understanding.  ROSE, SERGEY

## 2018-02-19 NOTE — TELEPHONE ENCOUNTER
----- Message from Anabel Do sent at 2/19/2018 11:19 AM CST -----  Please call pt back at [phone] for test results.

## 2018-02-23 ENCOUNTER — ROUTINE PRENATAL (OUTPATIENT)
Dept: OBSTETRICS AND GYNECOLOGY | Facility: CLINIC | Age: 23
End: 2018-02-23
Payer: MEDICAID

## 2018-02-23 VITALS
DIASTOLIC BLOOD PRESSURE: 60 MMHG | BODY MASS INDEX: 42.42 KG/M2 | SYSTOLIC BLOOD PRESSURE: 110 MMHG | WEIGHT: 231.94 LBS

## 2018-02-23 DIAGNOSIS — O99.210 OBESITY AFFECTING PREGNANCY, ANTEPARTUM: ICD-10-CM

## 2018-02-23 DIAGNOSIS — O99.013 ANEMIA DURING PREGNANCY IN THIRD TRIMESTER: ICD-10-CM

## 2018-02-23 DIAGNOSIS — Z34.83 ENCOUNTER FOR SUPERVISION OF OTHER NORMAL PREGNANCY IN THIRD TRIMESTER: Primary | ICD-10-CM

## 2018-02-23 PROCEDURE — 99213 OFFICE O/P EST LOW 20 MIN: CPT | Mod: TH,S$PBB,, | Performed by: ADVANCED PRACTICE MIDWIFE

## 2018-02-23 PROCEDURE — 3008F BODY MASS INDEX DOCD: CPT | Mod: ,,, | Performed by: ADVANCED PRACTICE MIDWIFE

## 2018-02-23 PROCEDURE — 99999 PR PBB SHADOW E&M-EST. PATIENT-LVL II: CPT | Mod: PBBFAC,,, | Performed by: ADVANCED PRACTICE MIDWIFE

## 2018-02-23 PROCEDURE — 99212 OFFICE O/P EST SF 10 MIN: CPT | Mod: PBBFAC,TH | Performed by: ADVANCED PRACTICE MIDWIFE

## 2018-02-23 NOTE — PROGRESS NOTES
Denies RUC, good FM, s/s of labor reviewed. + GBS, aware of treatment.   Coffective counseling sheet Learn Your Baby discussed with mother. Instructed regarding feeding cues and methods to calm baby. Encouraged mother to download Coffective mobile ofelia if she has not already done so.  Mother verbalized understanding.   Coffective counseling sheet Nourish discussed with mother. Reinforced basic breastfeeding position and latch as well as proper hand expression technique. Encouraged mother to download Coffective mobile ofelia if she has not already done so.  Mother verbalizes understanding. al

## 2018-03-08 ENCOUNTER — TELEPHONE (OUTPATIENT)
Dept: OBSTETRICS AND GYNECOLOGY | Facility: CLINIC | Age: 23
End: 2018-03-08

## 2018-03-08 NOTE — TELEPHONE ENCOUNTER
----- Message from Rosalva Harris sent at 3/8/2018  9:25 AM CST -----  Contact: [phone]   Pt would like to consult with the nurse about body pain.  Please call back at [phone].  Thx_AH

## 2018-03-08 NOTE — TELEPHONE ENCOUNTER
"Pt c/o "feel something popping in my pelvic, don't know if it's my bones or if it's the baby's bones for the last 3 days, and I don't want the baby to come out with a funny looking head."  Appt made for today at 3:15pm at [place] location.  Pt voiced understanding.  ROSE, LPN  "

## 2018-03-13 ENCOUNTER — ROUTINE PRENATAL (OUTPATIENT)
Dept: OBSTETRICS AND GYNECOLOGY | Facility: CLINIC | Age: 23
End: 2018-03-13
Payer: MEDICAID

## 2018-03-13 VITALS
DIASTOLIC BLOOD PRESSURE: 74 MMHG | SYSTOLIC BLOOD PRESSURE: 124 MMHG | BODY MASS INDEX: 43.75 KG/M2 | WEIGHT: 239.19 LBS

## 2018-03-13 DIAGNOSIS — N89.8 VAGINAL LESION: ICD-10-CM

## 2018-03-13 DIAGNOSIS — O99.013 ANEMIA DURING PREGNANCY IN THIRD TRIMESTER: ICD-10-CM

## 2018-03-13 DIAGNOSIS — O99.210 OBESITY AFFECTING PREGNANCY, ANTEPARTUM: Primary | ICD-10-CM

## 2018-03-13 PROCEDURE — 99213 OFFICE O/P EST LOW 20 MIN: CPT | Mod: PBBFAC,TH | Performed by: ADVANCED PRACTICE MIDWIFE

## 2018-03-13 PROCEDURE — 99213 OFFICE O/P EST LOW 20 MIN: CPT | Mod: TH,S$PBB,, | Performed by: ADVANCED PRACTICE MIDWIFE

## 2018-03-13 PROCEDURE — 99999 PR PBB SHADOW E&M-EST. PATIENT-LVL III: CPT | Mod: PBBFAC,,, | Performed by: ADVANCED PRACTICE MIDWIFE

## 2018-03-13 NOTE — PROGRESS NOTES
Ready for baby, UC last night, sat on ball, none since. rec pt stay active, walk, s/s of labor reviewed. al

## 2018-03-14 ENCOUNTER — HOSPITAL ENCOUNTER (OUTPATIENT)
Facility: HOSPITAL | Age: 23
Discharge: HOME | End: 2018-03-14
Attending: OBSTETRICS & GYNECOLOGY | Admitting: OBSTETRICS & GYNECOLOGY
Payer: MEDICAID

## 2018-03-14 VITALS
RESPIRATION RATE: 18 BRPM | SYSTOLIC BLOOD PRESSURE: 106 MMHG | TEMPERATURE: 98 F | BODY MASS INDEX: 43.98 KG/M2 | WEIGHT: 239 LBS | HEIGHT: 62 IN | HEART RATE: 92 BPM | DIASTOLIC BLOOD PRESSURE: 60 MMHG

## 2018-03-14 DIAGNOSIS — O47.9 THREATENED LABOR AT TERM: ICD-10-CM

## 2018-03-14 LAB
C TRACH DNA SPEC QL NAA+PROBE: NOT DETECTED
N GONORRHOEA DNA SPEC QL NAA+PROBE: NOT DETECTED

## 2018-03-14 PROCEDURE — 25000003 PHARM REV CODE 250: Performed by: ADVANCED PRACTICE MIDWIFE

## 2018-03-14 PROCEDURE — 59025 FETAL NON-STRESS TEST: CPT

## 2018-03-14 PROCEDURE — 99213 OFFICE O/P EST LOW 20 MIN: CPT | Mod: TH,25,, | Performed by: ADVANCED PRACTICE MIDWIFE

## 2018-03-14 PROCEDURE — 87491 CHLMYD TRACH DNA AMP PROBE: CPT

## 2018-03-14 PROCEDURE — G0378 HOSPITAL OBSERVATION PER HR: HCPCS

## 2018-03-14 PROCEDURE — 59025 FETAL NON-STRESS TEST: CPT | Mod: 26,,, | Performed by: ADVANCED PRACTICE MIDWIFE

## 2018-03-14 PROCEDURE — 99211 OFF/OP EST MAY X REQ PHY/QHP: CPT | Mod: 25,TH

## 2018-03-14 RX ORDER — ONDANSETRON 8 MG/1
8 TABLET, ORALLY DISINTEGRATING ORAL EVERY 8 HOURS PRN
Status: DISCONTINUED | OUTPATIENT
Start: 2018-03-14 | End: 2018-03-14 | Stop reason: HOSPADM

## 2018-03-14 RX ORDER — ACETAMINOPHEN 500 MG
500 TABLET ORAL EVERY 6 HOURS PRN
Status: DISCONTINUED | OUTPATIENT
Start: 2018-03-14 | End: 2018-03-14 | Stop reason: HOSPADM

## 2018-03-14 RX ADMIN — SODIUM CHLORIDE, POTASSIUM CHLORIDE, SODIUM LACTATE AND CALCIUM CHLORIDE: 600; 310; 30; 20 INJECTION, SOLUTION INTRAVENOUS at 02:03

## 2018-03-14 NOTE — H&P
"[facility]  Obstetrics  History & Physical    Patient Name: Padmini Wren  MRN: [id number]  Admission Date: 3/14/2018  Primary Care Provider: Taryn Salcido MD    Subjective:     Principal Problem:Threatened labor at term    History of Present Illness:  3/14/18 at 1200: 22 y.o.   with IUP at 39w1d presents with c/o irregular ctxs and leakage of fluid    Obstetric HPI:  Patient reports Date/time of onset: 3/13/18 at 1830 contractions, active fetal movement, some spotting this past Saturday- was seen in the office 3/13/18, vaginal bleeding , Yes loss of fluid "since last week"    This pregnancy has been complicated by anemia- pt admits to noncompliance with PO iron,  UCs, L vaginal lesion- negative HSV, obesity, gall stones, anxiety- no medications, Rheumatoid arthritis, seizures- last one was 5 years ago    Obstetric History       T1      L1     SAB0   TAB0   Ectopic0   Multiple0   Live Births1       # Outcome Date GA Lbr Paulino/2nd Weight Sex Delivery Anes PTL Lv   2 Current            1 Term 16   3.6 kg (7 lb 15 oz) M Vag-Spont None  JOJO        Past Medical History:   Diagnosis Date    Anxiety     Arthritis     Asthma     JRA (juvenile rheumatoid arthritis)     Seizures      Past Surgical History:   Procedure Laterality Date    ANKLE FRACTURE SURGERY      CYST REMOVAL      tubes ears         No prescriptions prior to admission.       Review of patient's allergies indicates:  No Known Allergies     Family History     Problem Relation (Age of Onset)    Asthma Mother, Brother, Brother    Inflammatory bowel disease Mother        Social History Main Topics    Smoking status: Never Smoker    Smokeless tobacco: Never Used    Alcohol use Yes    Drug use: No    Sexual activity: Yes     Birth control/ protection: None     Review of Systems   All other systems reviewed and are negative.     Objective:     Vital Signs (Most Recent):    Vital Signs " (24h Range):  BP: (124)/(74) 124/74        There is no height or weight on file to calculate BMI.    FHT: 140bpm, Cat 1 (reassuring)  TOCO:  Q 2-4 minutes    Physical Exam:   Constitutional: She is oriented to person, place, and time. She appears well-developed and well-nourished.    HENT:   Head: Normocephalic.     Neck: Normal range of motion. Neck supple. No thyromegaly present.    Cardiovascular: Normal rate, regular rhythm and normal heart sounds.  Exam reveals no edema.     Pulmonary/Chest: Effort normal and breath sounds normal. No respiratory distress.        Abdominal: Soft. Normal appearance and bowel sounds are normal. There is no tenderness. There is no rebound and no guarding. No hernia.     Genitourinary: Vagina normal. No vaginal discharge found.           Musculoskeletal: Normal range of motion. She exhibits no edema or tenderness.       Neurological: She is alert and oriented to person, place, and time.    Skin: Skin is warm and dry. No rash noted.    Psychiatric: She has a normal mood and affect.       Cervix:  Per RN  Dilation:  3  Effacement:  70  Station: -3  Presentation: Vertex     Significant Labs:  Lab Results   Component Value Date    GROUPTRH A POS 2017    HEPBSAG Negative 2017    STREPBCULT  2018     STREPTOCOCCUS AGALACTIAE (GROUP B)  Beta-hemolytic streptococci are routinely susceptible to   penicillins,cephalosporins and carbapenems.         I have personallly reviewed all pertinent lab results from the last 24 hours.    Assessment/Plan:     22 y.o. female  at 39w1d for:    * Threatened labor at term    Admit to triage  NST, SVE, PO and IV hydration   SSE- neg pooling, neg vulsalva  Ferning- neg  Nitrazine- neg  Wet prep- neg   GC/CMZ pending          ANNA MARIE Tavarez CNM  Obstetrics  [facility]

## 2018-03-14 NOTE — SUBJECTIVE & OBJECTIVE
"Obstetric HPI:  Patient reports Date/time of onset: 3/13/18 at 1830 contractions, active fetal movement, some spotting this past Saturday- was seen in the office 3/13/18, vaginal bleeding , Yes loss of fluid "since last week"    This pregnancy has been complicated by anemia- pt admits to noncompliance with PO iron,  UCs, L vaginal lesion- negative HSV, obesity, gall stones, anxiety- no medications, Rheumatoid arthritis, seizures- last one was 5 years ago    Obstetric History       T1      L1     SAB0   TAB0   Ectopic0   Multiple0   Live Births1       # Outcome Date GA Lbr Paulino/2nd Weight Sex Delivery Anes PTL Lv   2 Current            1 Term 16   3.6 kg (7 lb 15 oz) M Vag-Spont None  JOJO        Past Medical History:   Diagnosis Date    Anxiety     Arthritis     Asthma     JRA (juvenile rheumatoid arthritis)     Seizures      Past Surgical History:   Procedure Laterality Date    ANKLE FRACTURE SURGERY      CYST REMOVAL      tubes ears         No prescriptions prior to admission.       Review of patient's allergies indicates:  No Known Allergies     Family History     Problem Relation (Age of Onset)    Asthma Mother, Brother, Brother    Inflammatory bowel disease Mother        Social History Main Topics    Smoking status: Never Smoker    Smokeless tobacco: Never Used    Alcohol use Yes    Drug use: No    Sexual activity: Yes     Birth control/ protection: None     Review of Systems   All other systems reviewed and are negative.     Objective:     Vital Signs (Most Recent):    Vital Signs (24h Range):  BP: (124)/(74) 124/74        There is no height or weight on file to calculate BMI.    FHT: 140bpm, Cat 1 (reassuring)  TOCO:  Q 2-4 minutes    Physical Exam:   Constitutional: She is oriented to person, place, and time. She appears well-developed and well-nourished.    HENT:   Head: Normocephalic.     Neck: Normal range of motion. Neck supple. No thyromegaly present.  "   Cardiovascular: Normal rate, regular rhythm and normal heart sounds.  Exam reveals no edema.     Pulmonary/Chest: Effort normal and breath sounds normal. No respiratory distress.        Abdominal: Soft. Normal appearance and bowel sounds are normal. There is no tenderness. There is no rebound and no guarding. No hernia.     Genitourinary: Vagina normal. No vaginal discharge found.           Musculoskeletal: Normal range of motion. She exhibits no edema or tenderness.       Neurological: She is alert and oriented to person, place, and time.    Skin: Skin is warm and dry. No rash noted.    Psychiatric: She has a normal mood and affect.       Cervix:  Per RN  Dilation:  3  Effacement:  70  Station: -3  Presentation: Vertex     Significant Labs:  Lab Results   Component Value Date    GROUPTRH A POS 07/28/2017    HEPBSAG Negative 07/28/2017    STREPBCULT  02/13/2018     STREPTOCOCCUS AGALACTIAE (GROUP B)  Beta-hemolytic streptococci are routinely susceptible to   penicillins,cephalosporins and carbapenems.         I have personallly reviewed all pertinent lab results from the last 24 hours.

## 2018-03-14 NOTE — ASSESSMENT & PLAN NOTE
Admit to triage  NST, SVE, PO and IV hydration   SSE- neg pooling, neg vulsalva  Ferning- neg  Nitrazine- neg  Wet prep- neg   GC/CMZ pending

## 2018-03-14 NOTE — ASSESSMENT & PLAN NOTE
Admit to triage  NST, SVE, PO and IV hydration   SSE- neg pooling, neg vulsalva  Ferning- neg  Nitrazine- neg  Wet prep- neg   GC/CMZ pending  At 0300: SVE unchanged. Pt desires to go home

## 2018-03-14 NOTE — SUBJECTIVE & OBJECTIVE
Obstetric HPI:  Patient reports Intensity: mild contractions, active fetal movement, absent vaginal bleeding , absent loss of fluid      Objective:     Vital Signs (Most Recent):  Temp: 97.8 °F (36.6 °C) (03/14/18 0015)  Pulse: 108 (03/14/18 0015)  Resp: 18 (03/14/18 0015)  BP: 128/73 (03/14/18 0015) Vital Signs (24h Range):  Temp:  [97.8 °F (36.6 °C)] 97.8 °F (36.6 °C)  Pulse:  [108] 108  Resp:  [18] 18  BP: (124-128)/(73-74) 128/73     Weight: 108.4 kg (239 lb)  Body mass index is 43.71 kg/m².    FHT: 135bpm, Cat 1 (reassuring)  TOCO:  irregular in strength, duration, and frequency.  Mild to palpation     No intake or output data in the 24 hours ending 03/14/18 0324    Cervical Exam:  Dilation:  3.5  Effacement:  70  Station: -4  Presentation: Vertex     Significant Labs:  Recent Lab Results     None          Physical Exam

## 2018-03-14 NOTE — PROCEDURES
"Padmini rWen is a 22 y.o. female patient.    Temp: 97.8 °F (36.6 °C) (03/14/18 0015)  Pulse: 108 (03/14/18 0015)  Resp: 18 (03/14/18 0015)  BP: 128/73 (03/14/18 0015)  Weight: 108.4 kg (239 lb) (03/14/18 0030)  Height: 5' 2" (157.5 cm) (03/14/18 0030)       Obtain Fetal nonstress test (NST)  Date/Time: 3/14/2018 3:28 AM  Performed by: ANYN BENSON  Authorized by: ANNY BENSON     Nonstress Test:     Variability:  6-25 BPM    Decelerations:  None    Accelerations:  15 bpm    Acoustic Stimulator: No      Baseline:  135    Uterine Irritability: No      Contractions:  Irregular    Contraction Frequency:  Irregular in strength, duration, and frequency.  Mild to palpation   Biophysical Profile:     Nonstress Test Interpretation: reactive      Overall Impression:  Reassuring  Post-procedure:     Patient tolerance:  Patient tolerated the procedure well with no immediate complications      ANNA MARIE Benson  3/14/2018    "

## 2018-03-14 NOTE — PROGRESS NOTES
[facility] -   Obstetrics  Antepartum Progress Note    Patient Name: Padmini Wren  MRN: [id number]  Admission Date: 3/14/2018  Hospital Length of Stay: 0 days  Attending Physician: Franchesca Eisenberg, *  Primary Care Provider: Taryn Salcido MD    Subjective:     Principal Problem:Threatened labor at term    HPI:  3/14/18 at 1200: 22 y.o.   with IUP at 39w1d presents with c/o irregular ctxs and leakage of fluid    Hospital Course:  3/14/18 at 1200: admit to triage, NST, SVE, SSE, wet prep, ferning, nitrazine, GC/CMZ, PO and IV hydration  0300: SVE unchanged 3.5/70/-4, cx posterior.  Ctxs irregular in duration, strength, frequency; palpating mild.  Pt desires to go home    Obstetric HPI:  Patient reports Intensity: mild contractions, active fetal movement, absent vaginal bleeding , absent loss of fluid      Objective:     Vital Signs (Most Recent):  Temp: 97.8 °F (36.6 °C) (18 0015)  Pulse: 108 (18 0015)  Resp: 18 (18 0015)  BP: 128/73 (18 0015) Vital Signs (24h Range):  Temp:  [97.8 °F (36.6 °C)] 97.8 °F (36.6 °C)  Pulse:  [108] 108  Resp:  [18] 18  BP: (124-128)/(73-74) 128/73     Weight: 108.4 kg (239 lb)  Body mass index is 43.71 kg/m².    FHT: 135bpm, Cat 1 (reassuring)  TOCO:  irregular in strength, duration, and frequency.  Mild to palpation     No intake or output data in the 24 hours ending 18 0324    Cervical Exam:  Dilation:  3.5  Effacement:  70  Station: -4  Presentation: Vertex     Significant Labs:  Recent Lab Results     None          Physical Exam    Assessment/Plan:     22 y.o. female  at 39w1d for:    * Threatened labor at term    Admit to triage  NST, SVE, PO and IV hydration   SSE- neg pooling, neg vulsalva  Ferning- neg  Nitrazine- neg  Wet prep- neg   GC/CMZ pending  At 0300: SVE unchanged. Pt desires to go home             ANNA MARIE Tavarez CNM  Obstetrics  [facility]

## 2018-03-14 NOTE — HOSPITAL COURSE
3/14/18 at 1200: admit to triage, NST, SVE, SSE, wet prep, ferning, nitrazine, GC/CMZ, PO and IV hydration  0300: SVE unchanged 3.5/70/-4, cx posterior.  Ctxs irregular in duration, strength, frequency; palpating mild.  Pt desires to go home

## 2018-03-14 NOTE — HPI
3/14/18 at 1200: 22 y.o.   with IUP at 39w1d presents with c/o irregular ctxs and leakage of fluid

## 2018-03-15 ENCOUNTER — HOSPITAL ENCOUNTER (OUTPATIENT)
Facility: HOSPITAL | Age: 23
Discharge: HOME | End: 2018-03-16
Attending: OBSTETRICS & GYNECOLOGY | Admitting: OBSTETRICS & GYNECOLOGY
Payer: MEDICAID

## 2018-03-15 DIAGNOSIS — O47.9 THREATENED LABOR AT TERM: ICD-10-CM

## 2018-03-16 VITALS
RESPIRATION RATE: 21 BRPM | TEMPERATURE: 98 F | HEIGHT: 62 IN | BODY MASS INDEX: 43.98 KG/M2 | HEART RATE: 97 BPM | DIASTOLIC BLOOD PRESSURE: 67 MMHG | WEIGHT: 239 LBS | SYSTOLIC BLOOD PRESSURE: 142 MMHG

## 2018-03-16 PROBLEM — O23.43 URINARY TRACT INFECTION IN MOTHER DURING THIRD TRIMESTER OF PREGNANCY: Status: RESOLVED | Noted: 2018-02-10 | Resolved: 2018-03-16

## 2018-03-16 PROBLEM — O47.9 UTERINE CONTRACTIONS DURING PREGNANCY: Status: RESOLVED | Noted: 2018-02-10 | Resolved: 2018-03-16

## 2018-03-16 PROCEDURE — 59025 FETAL NON-STRESS TEST: CPT | Mod: 26,,, | Performed by: MIDWIFE

## 2018-03-16 PROCEDURE — 99213 OFFICE O/P EST LOW 20 MIN: CPT | Mod: TH,25,, | Performed by: MIDWIFE

## 2018-03-16 PROCEDURE — 59025 FETAL NON-STRESS TEST: CPT

## 2018-03-16 PROCEDURE — 99211 OFF/OP EST MAY X REQ PHY/QHP: CPT | Mod: TH,25

## 2018-03-16 RX ORDER — ACETAMINOPHEN 500 MG
500 TABLET ORAL EVERY 6 HOURS PRN
Status: DISCONTINUED | OUTPATIENT
Start: 2018-03-16 | End: 2018-03-16 | Stop reason: HOSPADM

## 2018-03-16 RX ORDER — ONDANSETRON 8 MG/1
8 TABLET, ORALLY DISINTEGRATING ORAL EVERY 8 HOURS PRN
Status: DISCONTINUED | OUTPATIENT
Start: 2018-03-16 | End: 2018-03-16 | Stop reason: HOSPADM

## 2018-03-16 NOTE — PROCEDURES
"Padmini OLIVAREZ Maru Wren is a 23 y.o. female patient.    Temp: 98.2 °F (36.8 °C) (03/16/18 0002)  Pulse: 97 (03/16/18 0219)  Resp: (!) 21 (03/16/18 0002)  BP: (!) 142/67 (03/16/18 0219)  Weight: 108.4 kg (239 lb) (03/16/18 0002)  Height: 5' 2" (157.5 cm) (03/16/18 0002)       Obtain Fetal nonstress test (NST)  Date/Time: 3/16/2018 4:12 AM  Performed by: RICHAR VERNON  Authorized by: RICHAR VERNON     Nonstress Test:     Variability:  6-25 BPM    Decelerations:  None    Accelerations:  15 bpm    Contractions:  Irregular  Biophysical Profile:     Nonstress Test Interpretation: reactive      Overall Impression:  Reassuring  Post-procedure:     Patient tolerance:  Patient tolerated the procedure well with no immediate complications        Richar Vernon  3/16/2018  "

## 2018-03-16 NOTE — SUBJECTIVE & OBJECTIVE
Obstetric HPI:  Patient reports contractions, active fetal movement, No vaginal bleeding , No loss of fluid     This pregnancy has been complicated by obesity, anemia    Obstetric History       T1      L1     SAB0   TAB0   Ectopic0   Multiple0   Live Births1       # Outcome Date GA Lbr Paulino/2nd Weight Sex Delivery Anes PTL Lv   2 Current            1 Term 16   3.6 kg (7 lb 15 oz) M Vag-Spont None  JOJO        Past Medical History:   Diagnosis Date    Anxiety     Arthritis     Asthma     JRA (juvenile rheumatoid arthritis)     Seizures      Past Surgical History:   Procedure Laterality Date    ANKLE FRACTURE SURGERY      CYST REMOVAL      tubes ears         No prescriptions prior to admission.       Review of patient's allergies indicates:  No Known Allergies     Family History     Problem Relation (Age of Onset)    Asthma Mother, Brother, Brother    Inflammatory bowel disease Mother        Social History Main Topics    Smoking status: Never Smoker    Smokeless tobacco: Never Used    Alcohol use Yes    Drug use: No    Sexual activity: Yes     Birth control/ protection: None     Review of Systems   Gastrointestinal: Positive for abdominal pain.   Musculoskeletal: Positive for back pain.   All other systems reviewed and are negative.     Objective:     Vital Signs (Most Recent):    Vital Signs (24h Range):           There is no height or weight on file to calculate BMI.    FHT: Cat 1 (reassuring)  TOCO:  Q 2-5 minutes    Physical Exam:   Constitutional: She is oriented to person, place, and time. She appears well-developed and well-nourished.    HENT:   Head: Normocephalic.     Neck: Normal range of motion.     Pulmonary/Chest: Effort normal.        Abdominal: Soft.             Musculoskeletal: Normal range of motion.       Neurological: She is alert and oriented to person, place, and time.    Skin: Skin is warm and dry.    Psychiatric: She has a normal mood and affect. Her behavior is  normal. Judgment and thought content normal.       Cervix:  Dilation:  3.5  Effacement:  50%  Station: -3  Presentation: Vertex     Significant Labs:  Lab Results   Component Value Date    GROUPTRH A POS 07/28/2017    HEPBSAG Negative 07/28/2017    STREPBCULT  02/13/2018     STREPTOCOCCUS AGALACTIAE (GROUP B)  Beta-hemolytic streptococci are routinely susceptible to   penicillins,cephalosporins and carbapenems.         I have personallly reviewed all pertinent lab results from the last 24 hours.

## 2018-03-16 NOTE — PROGRESS NOTES
Spoke with TERRENCE Villar CNM about bp 140's/70s but pt reports no s/s of pre-e. Okay with discharge. Educate about pre-e s/s and when to report to l&d unit.

## 2018-03-16 NOTE — H&P
[facility] -   Obstetrics  History & Physical    Patient Name: Padmini Wren  MRN: [id number]  Admission Date: 3/15/2018  Primary Care Provider: Taryn Salcido MD    Subjective:     Principal Problem:Threatened labor at term    History of Present Illness:  Pt presented to unit w/ c/o ctx's    Obstetric HPI:  Patient reports contractions, active fetal movement, No vaginal bleeding , No loss of fluid     This pregnancy has been complicated by obesity, anemia    Obstetric History       T1      L1     SAB0   TAB0   Ectopic0   Multiple0   Live Births1       # Outcome Date GA Lbr Paulino/2nd Weight Sex Delivery Anes PTL Lv   2 Current            1 Term 16   3.6 kg (7 lb 15 oz) M Vag-Spont None  JOJO        Past Medical History:   Diagnosis Date    Anxiety     Arthritis     Asthma     JRA (juvenile rheumatoid arthritis)     Seizures      Past Surgical History:   Procedure Laterality Date    ANKLE FRACTURE SURGERY      CYST REMOVAL      tubes ears         No prescriptions prior to admission.       Review of patient's allergies indicates:  No Known Allergies     Family History     Problem Relation (Age of Onset)    Asthma Mother, Brother, Brother    Inflammatory bowel disease Mother        Social History Main Topics    Smoking status: Never Smoker    Smokeless tobacco: Never Used    Alcohol use Yes    Drug use: No    Sexual activity: Yes     Birth control/ protection: None     Review of Systems   Gastrointestinal: Positive for abdominal pain.   Musculoskeletal: Positive for back pain.   All other systems reviewed and are negative.     Objective:     Vital Signs (Most Recent):    Vital Signs (24h Range):           There is no height or weight on file to calculate BMI.    FHT: Cat 1 (reassuring)  TOCO:  Q 2-5 minutes    Physical Exam:   Constitutional: She is oriented to person, place, and time. She appears well-developed and well-nourished.    HENT:   Head:  Normocephalic.     Neck: Normal range of motion.     Pulmonary/Chest: Effort normal.        Abdominal: Soft.             Musculoskeletal: Normal range of motion.       Neurological: She is alert and oriented to person, place, and time.    Skin: Skin is warm and dry.    Psychiatric: She has a normal mood and affect. Her behavior is normal. Judgment and thought content normal.       Cervix:  Dilation:  3.5  Effacement:  50%  Station: -3  Presentation: Vertex     Significant Labs:  Lab Results   Component Value Date    GROUPTRH A POS 2017    HEPBSAG Negative 2017    STREPBCULT  2018     STREPTOCOCCUS AGALACTIAE (GROUP B)  Beta-hemolytic streptococci are routinely susceptible to   penicillins,cephalosporins and carbapenems.         I have personallly reviewed all pertinent lab results from the last 24 hours.    Assessment/Plan:     23 y.o. female  at 39w3d for:    * Threatened labor at term    Observed for S/S Labor            Richar Vernon, ESTEFANY  Obstetrics  [facility]

## 2018-03-16 NOTE — DISCHARGE SUMMARY
[facility]  Obstetrics  Discharge Summary      Patient Name: Padmini Wren  MRN: [id number]  Admission Date: 3/15/2018  Hospital Length of Stay: 0 days  Discharge Date and Time: 3/16/18  Attending Physician: Nayeli Delgado MD   Discharging Provider: Richar Vernon CNM  Primary Care Provider: Taryn Salcido MD    HPI: Pt presented to unit w/ c/o ctx's    * No surgery found *     Hospital Course:   EFM/TOCO  NST  Observed for S/S labor        Final Active Diagnoses:    Diagnosis Date Noted POA    PRINCIPAL PROBLEM:  Threatened labor at term [O47.9] 03/14/2018 Yes      Problems Resolved During this Admission:    Diagnosis Date Noted Date Resolved POA        Labs: All labs within the past 24 hours have been reviewed        Immunizations     None          This patient has no babies on file.  Pending Diagnostic Studies:     None          Discharged Condition: good    Disposition: Home or Self Care    Follow Up:    Patient Instructions:   No discharge procedures on file.  Medications:  There are no discharge medications for this patient.      Richar Vernon CNM  Obstetrics  [facility]

## 2018-03-19 ENCOUNTER — ANESTHESIA (OUTPATIENT)
Dept: OBSTETRICS AND GYNECOLOGY | Facility: HOSPITAL | Age: 23
End: 2018-03-19

## 2018-03-19 ENCOUNTER — HOSPITAL ENCOUNTER (INPATIENT)
Facility: HOSPITAL | Age: 23
LOS: 2 days | Discharge: HOME | End: 2018-03-21
Attending: OBSTETRICS & GYNECOLOGY | Admitting: OBSTETRICS & GYNECOLOGY
Payer: MEDICAID

## 2018-03-19 ENCOUNTER — ANESTHESIA EVENT (OUTPATIENT)
Dept: OBSTETRICS AND GYNECOLOGY | Facility: HOSPITAL | Age: 23
End: 2018-03-19

## 2018-03-19 DIAGNOSIS — Z37.9 NORMAL LABOR: ICD-10-CM

## 2018-03-19 PROBLEM — O99.820 GBS (GROUP B STREPTOCOCCUS CARRIER), +RV CULTURE, CURRENTLY PREGNANT: Status: ACTIVE | Noted: 2018-03-19

## 2018-03-19 LAB
ABO + RH BLD: NORMAL
BASOPHILS # BLD AUTO: 0.01 K/UL
BASOPHILS NFR BLD: 0.1 %
BLD GP AB SCN CELLS X3 SERPL QL: NORMAL
DIFFERENTIAL METHOD: ABNORMAL
EOSINOPHIL # BLD AUTO: 0.2 K/UL
EOSINOPHIL NFR BLD: 1.6 %
ERYTHROCYTE [DISTWIDTH] IN BLOOD BY AUTOMATED COUNT: 15.5 %
HCT VFR BLD AUTO: 33.4 %
HGB BLD-MCNC: 10.5 G/DL
LYMPHOCYTES # BLD AUTO: 3.7 K/UL
LYMPHOCYTES NFR BLD: 30.6 %
MCH RBC QN AUTO: 26.6 PG
MCHC RBC AUTO-ENTMCNC: 31.4 G/DL
MCV RBC AUTO: 85 FL
MONOCYTES # BLD AUTO: 0.6 K/UL
MONOCYTES NFR BLD: 5.2 %
NEUTROPHILS # BLD AUTO: 7.5 K/UL
NEUTROPHILS NFR BLD: 62.5 %
PLATELET # BLD AUTO: 269 K/UL
PMV BLD AUTO: 9.8 FL
RBC # BLD AUTO: 3.94 M/UL
WBC # BLD AUTO: 12.03 K/UL

## 2018-03-19 PROCEDURE — 25000003 PHARM REV CODE 250: Performed by: ADVANCED PRACTICE MIDWIFE

## 2018-03-19 PROCEDURE — 86850 RBC ANTIBODY SCREEN: CPT

## 2018-03-19 PROCEDURE — 72200004 HC VAGINAL DELIVERY LEVEL I

## 2018-03-19 PROCEDURE — 59409 OBSTETRICAL CARE: CPT | Mod: GB,,, | Performed by: ADVANCED PRACTICE MIDWIFE

## 2018-03-19 PROCEDURE — 11000001 HC ACUTE MED/SURG PRIVATE ROOM

## 2018-03-19 PROCEDURE — 63600175 PHARM REV CODE 636 W HCPCS: Performed by: ADVANCED PRACTICE MIDWIFE

## 2018-03-19 PROCEDURE — 85025 COMPLETE CBC W/AUTO DIFF WBC: CPT

## 2018-03-19 PROCEDURE — 0UQMXZZ REPAIR VULVA, EXTERNAL APPROACH: ICD-10-PCS | Performed by: ADVANCED PRACTICE MIDWIFE

## 2018-03-19 PROCEDURE — 72100002 HC LABOR CARE, 1ST 8 HOURS

## 2018-03-19 PROCEDURE — 0KQM0ZZ REPAIR PERINEUM MUSCLE, OPEN APPROACH: ICD-10-PCS | Performed by: ADVANCED PRACTICE MIDWIFE

## 2018-03-19 RX ORDER — MISOPROSTOL 200 UG/1
600 TABLET ORAL
Status: DISCONTINUED | OUTPATIENT
Start: 2018-03-19 | End: 2018-03-21 | Stop reason: HOSPADM

## 2018-03-19 RX ORDER — BUTORPHANOL TARTRATE 1 MG/ML
1 INJECTION INTRAMUSCULAR; INTRAVENOUS
Status: DISCONTINUED | OUTPATIENT
Start: 2018-03-19 | End: 2018-03-21 | Stop reason: HOSPADM

## 2018-03-19 RX ORDER — HYDROCORTISONE 25 MG/G
CREAM TOPICAL 3 TIMES DAILY PRN
Status: DISCONTINUED | OUTPATIENT
Start: 2018-03-19 | End: 2018-03-21 | Stop reason: HOSPADM

## 2018-03-19 RX ORDER — IBUPROFEN 600 MG/1
600 TABLET ORAL EVERY 6 HOURS PRN
Status: DISCONTINUED | OUTPATIENT
Start: 2018-03-19 | End: 2018-03-21 | Stop reason: HOSPADM

## 2018-03-19 RX ORDER — OXYTOCIN/RINGER'S LACTATE 20/1000 ML
41.65 PLASTIC BAG, INJECTION (ML) INTRAVENOUS CONTINUOUS
Status: DISPENSED | OUTPATIENT
Start: 2018-03-19 | End: 2018-03-19

## 2018-03-19 RX ORDER — BUTORPHANOL TARTRATE 1 MG/ML
2 INJECTION INTRAMUSCULAR; INTRAVENOUS
Status: DISCONTINUED | OUTPATIENT
Start: 2018-03-19 | End: 2018-03-21 | Stop reason: HOSPADM

## 2018-03-19 RX ORDER — ACETAMINOPHEN 325 MG/1
650 TABLET ORAL EVERY 6 HOURS PRN
Status: DISCONTINUED | OUTPATIENT
Start: 2018-03-19 | End: 2018-03-21 | Stop reason: HOSPADM

## 2018-03-19 RX ORDER — DOCUSATE SODIUM 100 MG/1
200 CAPSULE, LIQUID FILLED ORAL 2 TIMES DAILY PRN
Status: DISCONTINUED | OUTPATIENT
Start: 2018-03-19 | End: 2018-03-21 | Stop reason: HOSPADM

## 2018-03-19 RX ORDER — DIPHENHYDRAMINE HCL 25 MG
25 CAPSULE ORAL EVERY 4 HOURS PRN
Status: DISCONTINUED | OUTPATIENT
Start: 2018-03-19 | End: 2018-03-21 | Stop reason: HOSPADM

## 2018-03-19 RX ORDER — ONDANSETRON 8 MG/1
8 TABLET, ORALLY DISINTEGRATING ORAL EVERY 8 HOURS PRN
Status: DISCONTINUED | OUTPATIENT
Start: 2018-03-19 | End: 2018-03-19 | Stop reason: SDUPTHER

## 2018-03-19 RX ORDER — DIPHENHYDRAMINE HYDROCHLORIDE 50 MG/ML
25 INJECTION INTRAMUSCULAR; INTRAVENOUS EVERY 4 HOURS PRN
Status: DISCONTINUED | OUTPATIENT
Start: 2018-03-19 | End: 2018-03-21 | Stop reason: HOSPADM

## 2018-03-19 RX ORDER — HYDROCODONE BITARTRATE AND ACETAMINOPHEN 5; 325 MG/1; MG/1
1 TABLET ORAL EVERY 4 HOURS PRN
Status: DISCONTINUED | OUTPATIENT
Start: 2018-03-19 | End: 2018-03-21 | Stop reason: HOSPADM

## 2018-03-19 RX ORDER — ONDANSETRON 8 MG/1
8 TABLET, ORALLY DISINTEGRATING ORAL EVERY 8 HOURS PRN
Status: DISCONTINUED | OUTPATIENT
Start: 2018-03-19 | End: 2018-03-21 | Stop reason: HOSPADM

## 2018-03-19 RX ORDER — SODIUM CHLORIDE, SODIUM LACTATE, POTASSIUM CHLORIDE, CALCIUM CHLORIDE 600; 310; 30; 20 MG/100ML; MG/100ML; MG/100ML; MG/100ML
INJECTION, SOLUTION INTRAVENOUS CONTINUOUS
Status: DISCONTINUED | OUTPATIENT
Start: 2018-03-19 | End: 2018-03-21 | Stop reason: HOSPADM

## 2018-03-19 RX ADMIN — SODIUM CHLORIDE, POTASSIUM CHLORIDE, SODIUM LACTATE AND CALCIUM CHLORIDE: 600; 310; 30; 20 INJECTION, SOLUTION INTRAVENOUS at 05:03

## 2018-03-19 RX ADMIN — DEXTROSE 5 MILLION UNITS: 50 INJECTION, SOLUTION INTRAVENOUS at 05:03

## 2018-03-19 RX ADMIN — IBUPROFEN 600 MG: 600 TABLET, FILM COATED ORAL at 09:03

## 2018-03-19 RX ADMIN — HYDROCODONE BITARTRATE AND ACETAMINOPHEN 1 TABLET: 5; 325 TABLET ORAL at 10:03

## 2018-03-19 RX ADMIN — HYDROCODONE BITARTRATE AND ACETAMINOPHEN 1 TABLET: 5; 325 TABLET ORAL at 09:03

## 2018-03-19 NOTE — PLAN OF CARE
Problem: Patient Care Overview  Goal: Plan of Care Review  Outcome: Ongoing (interventions implemented as appropriate)  Pt admitted for active labor, contractions 2-3 minutes. Continuous U/S and toco monitoring. Pt questioning epidural for pain, will attempt tub for now. 1st dose PCN administered. VSS. Sister at bedside and offers support. Will continue to monitor.

## 2018-03-19 NOTE — PLAN OF CARE
Problem: Breastfeeding (Adult,Obstetrics,Pediatric)  Goal: Signs and Symptoms of Listed Potential Problems Will be Absent, Minimized or Managed (Breastfeeding)  Signs and symptoms of listed potential problems will be absent, minimized or managed by discharge/transition of care (reference Breastfeeding (Adult,Obstetrics,Pediatric) CPG).  Outcome: Ongoing (interventions implemented as appropriate)  Pt  well until infant's transfer to NICU. Discussed with pt importance of continuing to put infant to breast as often as NICU RN allows. Informed pt that a breast pump would be given to her for hospital use upon her request. Pt states that she does not want a pump at this time, but will consider it.

## 2018-03-19 NOTE — L&D DELIVERY NOTE
[facility]  Vaginal Delivery   Operative Note    SUMMARY   Called to delivery, patient laboring in the tub.  With excellent maternal effort and pushing, normal spontaneous vaginal delivery and waterbirth of live male infant, was placed on mothers abdomen for skin to skin and bulb suctioning performed.   Infant delivered position RIP over perineum  Nuchal cord: No.    In tub, spontaneous delivery of placenta and membranes intact, using guarding of uterus and CCT. Expectant management of third stage of labor, no pitocin necessary, noting good uterine tone.    Assisted mother moving from tub to bed. 2nd degree perineal laceration and left labial laceration noted, patient placed in lithotomy position.  Adequate pain relief provided with lidocaine gel and SQ injection. Sutured with 3-0 vicryl on CT and 4-0 vicryl on SH for repair.  Patient tolerated delivery well. Sponge needle and lap counted correctly x2.    Indications:  (normal spontaneous vaginal delivery)  Pregnancy complicated by:   Patient Active Problem List   Diagnosis    Seizures    BMI 40.0-44.9, adult    Rheumatoid arthritis involving right knee with negative rheumatoid factor    Anxiety    History of gallstones    Encounter for supervision of normal pregnancy in third trimester    Gallstones    Obesity affecting pregnancy, antepartum    Anemia during pregnancy in third trimester    Vaginal lesion    Threatened labor at term    GBS (group B Streptococcus carrier), +RV culture, currently pregnant     (normal spontaneous vaginal delivery)    Single live     Perineal laceration     Admitting GA: 39w6d    Delivery Information for  Joe Wren    Birth information:  YOB: 2017   Time of birth: 7:53 AM   Sex: male   Head Delivery Date/Time:     Delivery type:    Gestational Age: 39w6d          [place] Measurements    Weight:  3480 g  Length:  53.3 cm  Head circumference:  35.6 cm  Chest  circumference:  34.3 cm  Abdominal girth:  30.5 cm         Rhodes Assessment    Living status:  Living  Apgars:     1 Minute:   5 Minute:   10 Minute:   15 Minute:   20 Minute:     Skin Color:   0  1       Heart Rate:   2  2       Reflex Irritability:   2  2       Muscle Tone:   2  2       Respiratory Effort:   2  2       Total:   8  9                      Assisted Delivery Details:    Forceps attempted?:  No  Vacuum extractor attempted?:  No         Shoulder Dystocia    Shoulder dystocia present?:  No           Presentation and Position    Presentation:  Vertex  Position:  Left Occiput Anterior           Interventions/Resuscitation         Cord    Vessels:  3 vessels  Complications:  None  Delayed Cord Clamping?:  Yes  Cord Clamped Date/Time:  3/19/2018  7:56 AM  Cord Blood Disposition:  Lab  Gases Sent?:  No  Stem Cell Collection (by MD):  No             Labor Events:       labor:       Labor Onset Date/Time: 02/10/2018       Dilation Complete Date/Time:         Start Pushing Date/Time:       Rupture Date/Time:              Rupture type:           Fluid Amount:        Fluid Color:        Fluid Odor:        Membrane Status (PeriCalm):        Rupture Date/Time (PeriCalm):        Fluid Amount (PeriCalm):        Fluid Color (PeriCalm):         steroids:       Antibiotics given for GBS:       Induction:       Indications for induction:        Augmentation:       Indications for augmentation:       Labor complications:       Additional complications:          Cervical ripening:                     Delivery:      Episiotomy: None     Indication for Episiotomy:       Perineal Lacerations: 2nd Repaired:  Yes   Periurethral Laceration: none Repaired:     Labial Laceration: left Repaired: Yes   Sulcus Laceration: none Repaired:     Vaginal Laceration: No Repaired:     Cervical Laceration: No Repaired:     Repair suture:       Repair # of packets: 2     Vaginal delivery QBL (mL): 250      QBL (mL): 0      Combined Blood Loss (mL): 250     Vaginal Sweep Performed: Yes     Surgicount Correct: No       Other providers:            Details (if applicable):  Trial of Labor      Categorization:      Priority:     Indications for :     Incision Type:       Additional  information:  Forceps:    Vacuum:    Breech:    Observed anomalies    Other (Comments):         ANNA MARIE ZHONG

## 2018-03-19 NOTE — H&P
[facility] -   Obstetrics  History & Physical    Patient Name: Padmini Wren  MRN: [id number]  Admission Date: 3/19/2018  Primary Care Provider: Taryn Salcido MD    Subjective:     Principal Problem:Normal labor    History of Present Illness:  C/o contractions since 0400am    Obstetric HPI:  Patient reports Date/time of onset: 3/19/18 @ 0400am contractions, active fetal movement, No vaginal bleeding , No loss of fluid     This pregnancy has been complicated by   GBS   Anemia, iron daily  Arthritis, Enbrel   Gallstones  Anxiety, not on meds  Obesity  Seizures, no meds, last seizure >5 years ago     Obstetric History       T1      L1     SAB0   TAB0   Ectopic0   Multiple0   Live Births1       # Outcome Date GA Lbr Paulino/2nd Weight Sex Delivery Anes PTL Lv   2 Current            1 Term 16   3.6 kg (7 lb 15 oz) M Vag-Spont None  JOJO        Past Medical History:   Diagnosis Date    Anxiety     Arthritis     Asthma     JRA (juvenile rheumatoid arthritis)     Seizures      Past Surgical History:   Procedure Laterality Date    ANKLE FRACTURE SURGERY      CYST REMOVAL      tubes ears         No prescriptions prior to admission.       Review of patient's allergies indicates:  No Known Allergies     Family History     Problem Relation (Age of Onset)    Asthma Mother, Brother, Brother    Inflammatory bowel disease Mother        Social History Main Topics    Smoking status: Never Smoker    Smokeless tobacco: Never Used    Alcohol use Yes    Drug use: No    Sexual activity: Yes     Birth control/ protection: None     Review of Systems   Constitutional: Negative.    HENT: Negative.    Eyes: Negative.    Respiratory: Negative.    Cardiovascular: Negative.    Gastrointestinal: Positive for abdominal pain.   Endocrine: Negative.    Genitourinary: Negative.    Musculoskeletal: Negative.    Skin:  Negative.   Neurological: Negative.    Hematological: Negative.     Psychiatric/Behavioral: Negative.    Breast: negative.    All other systems reviewed and are negative.     Objective:     Vital Signs (Most Recent):    Vital Signs (24h Range):           There is no height or weight on file to calculate BMI.    FHT: 130Cat 1 (reassuring)  TOCO:  Q 2-3 minutes    Physical Exam:   Constitutional: She is oriented to person, place, and time. Vital signs are normal. She appears well-developed and well-nourished. She is cooperative.    HENT:   Head: Normocephalic and atraumatic.     Neck: Normal range of motion. Neck supple.     Pulmonary/Chest: Effort normal.        Abdominal: Soft.   Gravid, non-tender     Genitourinary: Vagina normal and uterus normal. Pelvic exam was performed with patient supine. Cervix is normal. Labial bartholins normal.          Musculoskeletal: Normal range of motion and moves all extremeties.       Neurological: She is alert and oriented to person, place, and time. She has normal strength.    Skin: Skin is warm, dry and intact. Capillary refill takes less than 2 seconds.    Psychiatric: She has a normal mood and affect. Her speech is normal and behavior is normal. Judgment and thought content normal. Cognition and memory are normal.       Cervix:  Dilation:  5  Effacement:  90  Station: -1  Presentation: Vertex     Significant Labs:  Lab Results   Component Value Date    GROUPTRH A POS 2017    HEPBSAG Negative 2017    STREPBCULT  2018     STREPTOCOCCUS AGALACTIAE (GROUP B)  Beta-hemolytic streptococci are routinely susceptible to   penicillins,cephalosporins and carbapenems.         I have personallly reviewed all pertinent lab results from the last 24 hours.    Assessment/Plan:     23 y.o. female  at 39w6d for:    * Normal labor    Admit to LD  Expectant management, pt. Would like NCB        GBS (group B Streptococcus carrier), +RV culture, currently pregnant    GBS prophylaxis             Joseph Rogers CNM  Obstetrics  [facility]

## 2018-03-19 NOTE — LACTATION NOTE
Lactation rounds  Lactation packet given and admit information reviewed. Mother verbalizes understanding of expected  behaviors and output for the first 48 hours of life.  Discussed the importance of cue based feedings on demand, unrestricted access to the breast, and frequent uninterrupted skin to skin contact.  Risk and implications of artificial nipples and supplementation discussed.  Encouraged mother to call for assistance when desired or when infant is showing signs of hunger, contact number provided, mother verbalizes understanding.  Baby is showing feeding cues. Helped mother to settle in a cross cradle position on the right breast. Reviewed deep asymmetric latch and proper positioning. Mother is able to demonstrate back and deep latch easily obtained. Audible swallows noted, and mother denies pain or discomfort. Baby fed until content, and nipple shape and color is WDL upon unlatching. Reviewed hand expression and nipple care; mother able to return back demonstration.      18 1030   Maternal Infant Assessment   Breast Size Issue none   Breast Shape Bilateral:;round   Breast Density Bilateral:;soft   Areola Bilateral:;elastic   Nipple(s) Bilateral:;everted   LATCH Score   Latch 2-->grasps breast, tongue down, lips flanged, rhythmic sucking   Audible Swallowing 2-->spontaneous and intermittent (24 hrs old)   Type Of Nipple 2-->everted (after stimulation)   Comfort (Breast/Nipple) 2-->soft/nontender   Hold (Positioning) 2-->no assist from staff, mother able to position/hold infant   Score (less than 7 for 2/more consecutive times, consult Lactation Consultant) 10   Maternal Infant Feeding   Breastfeeding Education adequate milk volume;adequate infant intake;diet;importance of skin-to-skin contact   Lactation Interventions   Attachment Promotion skin-to-skin contact encouraged;role responsibility promoted;privacy provided;infant-mother separation minimized;family involvement promoted;counseling  provided;breastfeeding assistance provided;environment adjusted;face-to-face positioning promoted;rooming-in promoted   Breast Care: Breastfeeding manual expression to soften breast   Breastfeeding Assistance assisted with positioning;both breasts offered each feeding;feeding cue recognition promoted;feeding on demand promoted;infant latch-on verified;support offered   Maternal Breastfeeding Support encouragement offered;infant-mother separation minimized;lactation counseling provided   Latch Promotion positioning assisted

## 2018-03-19 NOTE — PLAN OF CARE
Problem: Postpartum (Vaginal Delivery) (Adult,Obstetrics,Pediatric)  Goal: Signs and Symptoms of Listed Potential Problems Will be Absent, Minimized or Managed (Postpartum)  Signs and symptoms of listed potential problems will be absent, minimized or managed by discharge/transition of care (reference Postpartum (Vaginal Delivery) (Adult,Obstetrics,Pediatric) CPG).  Outcome: Ongoing (interventions implemented as appropriate)  Pt progressing well. Vital signs stable. Pain well controlled with PO meds. Voiding without difficulty. Ambulating ad dieudonne. Lochia remains small, dark. Infant was transferred [facility] NICU at [facility] for low platelets. Pt understands she can visit infant 24 hours/day and can continue to breastfeed infant.

## 2018-03-19 NOTE — SUBJECTIVE & OBJECTIVE
Obstetric HPI:  Patient reports Date/time of onset: 3/19/18 @ 0400am contractions, active fetal movement, No vaginal bleeding , No loss of fluid     This pregnancy has been complicated by   GBS   Anemia, iron daily  Arthritis, Enbrel   Gallstones  Anxiety, not on meds  Obesity  Seizures, no meds, last seizure >5 years ago     Obstetric History       T1      L1     SAB0   TAB0   Ectopic0   Multiple0   Live Births1       # Outcome Date GA Lbr Paulino/2nd Weight Sex Delivery Anes PTL Lv   2 Current            1 Term 16   3.6 kg (7 lb 15 oz) M Vag-Spont None  JOJO        Past Medical History:   Diagnosis Date    Anxiety     Arthritis     Asthma     JRA (juvenile rheumatoid arthritis)     Seizures      Past Surgical History:   Procedure Laterality Date    ANKLE FRACTURE SURGERY      CYST REMOVAL      tubes ears         No prescriptions prior to admission.       Review of patient's allergies indicates:  No Known Allergies     Family History     Problem Relation (Age of Onset)    Asthma Mother, Brother, Brother    Inflammatory bowel disease Mother        Social History Main Topics    Smoking status: Never Smoker    Smokeless tobacco: Never Used    Alcohol use Yes    Drug use: No    Sexual activity: Yes     Birth control/ protection: None     Review of Systems   Constitutional: Negative.    HENT: Negative.    Eyes: Negative.    Respiratory: Negative.    Cardiovascular: Negative.    Gastrointestinal: Positive for abdominal pain.   Endocrine: Negative.    Genitourinary: Negative.    Musculoskeletal: Negative.    Skin:  Negative.   Neurological: Negative.    Hematological: Negative.    Psychiatric/Behavioral: Negative.    Breast: negative.    All other systems reviewed and are negative.     Objective:     Vital Signs (Most Recent):    Vital Signs (24h Range):           There is no height or weight on file to calculate BMI.    FHT: 130Cat 1 (reassuring)  TOCO:  Q 2-3 minutes    Physical Exam:    Constitutional: She is oriented to person, place, and time. Vital signs are normal. She appears well-developed and well-nourished. She is cooperative.    HENT:   Head: Normocephalic and atraumatic.     Neck: Normal range of motion. Neck supple.     Pulmonary/Chest: Effort normal.        Abdominal: Soft.   Gravid, non-tender     Genitourinary: Vagina normal and uterus normal. Pelvic exam was performed with patient supine. Cervix is normal. Labial bartholins normal.          Musculoskeletal: Normal range of motion and moves all extremeties.       Neurological: She is alert and oriented to person, place, and time. She has normal strength.    Skin: Skin is warm, dry and intact. Capillary refill takes less than 2 seconds.    Psychiatric: She has a normal mood and affect. Her speech is normal and behavior is normal. Judgment and thought content normal. Cognition and memory are normal.       Cervix:  Dilation:  5  Effacement:  90  Station: -1  Presentation: Vertex     Significant Labs:  Lab Results   Component Value Date    GROUPTRH A POS 07/28/2017    HEPBSAG Negative 07/28/2017    STREPBCULT  02/13/2018     STREPTOCOCCUS AGALACTIAE (GROUP B)  Beta-hemolytic streptococci are routinely susceptible to   penicillins,cephalosporins and carbapenems.         I have personallly reviewed all pertinent lab results from the last 24 hours.

## 2018-03-19 NOTE — HOSPITAL COURSE
Admit to LD  Expectant management, pt. Would like NCB in tub   3/19/18 , routine pp care  3/20/18 ppd 1 routine care   3/20/18 ppd 2 routine care, d/c home

## 2018-03-20 LAB
BASOPHILS # BLD AUTO: 0.03 K/UL
BASOPHILS NFR BLD: 0.2 %
DIFFERENTIAL METHOD: ABNORMAL
EOSINOPHIL # BLD AUTO: 0.2 K/UL
EOSINOPHIL NFR BLD: 1.5 %
ERYTHROCYTE [DISTWIDTH] IN BLOOD BY AUTOMATED COUNT: 15.5 %
HCT VFR BLD AUTO: 30.5 %
HGB BLD-MCNC: 9.4 G/DL
LYMPHOCYTES # BLD AUTO: 4.8 K/UL
LYMPHOCYTES NFR BLD: 37.1 %
MCH RBC QN AUTO: 26.4 PG
MCHC RBC AUTO-ENTMCNC: 30.8 G/DL
MCV RBC AUTO: 86 FL
MONOCYTES # BLD AUTO: 0.7 K/UL
MONOCYTES NFR BLD: 5.4 %
NEUTROPHILS # BLD AUTO: 7.2 K/UL
NEUTROPHILS NFR BLD: 55.8 %
PLATELET # BLD AUTO: 269 K/UL
PMV BLD AUTO: 10 FL
RBC # BLD AUTO: 3.56 M/UL
WBC # BLD AUTO: 12.94 K/UL

## 2018-03-20 PROCEDURE — 99231 SBSQ HOSP IP/OBS SF/LOW 25: CPT | Mod: ,,, | Performed by: ADVANCED PRACTICE MIDWIFE

## 2018-03-20 PROCEDURE — 36415 COLL VENOUS BLD VENIPUNCTURE: CPT

## 2018-03-20 PROCEDURE — 25000003 PHARM REV CODE 250: Performed by: ADVANCED PRACTICE MIDWIFE

## 2018-03-20 PROCEDURE — 11000001 HC ACUTE MED/SURG PRIVATE ROOM

## 2018-03-20 PROCEDURE — 85025 COMPLETE CBC W/AUTO DIFF WBC: CPT

## 2018-03-20 RX ORDER — MEDROXYPROGESTERONE ACETATE 150 MG/ML
150 INJECTION, SUSPENSION INTRAMUSCULAR ONCE
Status: DISCONTINUED | OUTPATIENT
Start: 2018-03-20 | End: 2018-03-21 | Stop reason: HOSPADM

## 2018-03-20 RX ADMIN — HYDROCODONE BITARTRATE AND ACETAMINOPHEN 1 TABLET: 5; 325 TABLET ORAL at 10:03

## 2018-03-20 RX ADMIN — IBUPROFEN 600 MG: 600 TABLET, FILM COATED ORAL at 05:03

## 2018-03-20 RX ADMIN — HYDROCODONE BITARTRATE AND ACETAMINOPHEN 1 TABLET: 5; 325 TABLET ORAL at 05:03

## 2018-03-20 RX ADMIN — IBUPROFEN 600 MG: 600 TABLET, FILM COATED ORAL at 09:03

## 2018-03-20 NOTE — LACTATION NOTE
Lactation Rounds: infant output and weight loss WNL. Upon entering the room mother has infant sucking on her finger. Mother states that infant and been feeding constantly since he has been admitted to [facility]. Reinforced expected output and feeding pattern on day of life 2; reinforced the normality and importance of cluster feeding. Encouraged unrestricted access to the breast, cue based feeds and feeds based on early feeding cues. Infant with intense feeding cues, strongly encouraged mother to allow infant to nurse, mother reluctantly accepts. Mother positions infant poorly to the left breast in the cross cradle position and obtains shallow latch. Attempted to position infant better at the breast, mother only allows infant to be rolled into the breast and nothing further. Discussed sign of adequate latch and importance of deep latch, mother does not allow assistance to help facilitate deep latch after risk and implications of inadequate latch discussed. Mother complains that infant is moving away from breast, attempted to show mother how to keep infant close, again mother denies assistance. Infant feeds with audible swallows, mother denies pain. Patient argumentative at time and not receptive to teaching despite multiple attempts to assist with optimal breastfeeding practices. Patients father comments to patient about her not accepting help. Explained to patient and family member that I am here to help and assist if desired, but I would not force her to do anything. Patient expresses gratitude. Mother denies any needs or concerns at this time. Strongly encouraged mother to call for assistance if desired. Mother verbalizes understanding of all education and assistance.       03/20/18 1045   Maternal Infant Assessment   Breast Shape Left:;pendulous;round  (right not visualized)   Breast Density Left:;soft   Areola Left:;elastic   Nipple(s) Left:;everted   Infant Assessment   Weight Loss (%) 0   Sucking Reflex present  "  Rooting Reflex present   Swallow Reflex present   Number of Stools (24 hours) 2   Number of Voids (24 hours) 4   LATCH Score   Latch 2-->grasps breast, tongue down, lips flanged, rhythmic sucking   Audible Swallowing 2-->spontaneous and intermittent (24 hrs old)   Type Of Nipple 2-->everted (after stimulation)   Comfort (Breast/Nipple) 2-->soft/nontender   Hold (Positioning) 1-->minimal assist, teach one side: mother does other, staff holds   Score (less than 7 for 2/more consecutive times, consult Lactation Consultant) 9   Maternal Infant Feeding   Maternal Emotional State independent;relaxed   Infant Positioning clutch/"football"  (left breast)   Signs of Milk Transfer audible swallow;infant jaw motion present   Presence of Pain no   Latch Assistance other (see comments)  (mother does not accept assistance)   Breastfeeding Education adequate infant intake;adequate milk volume;importance of skin-to-skin contact   Feeding Infant   Effective Latch During Feeding yes   Audible Swallow yes   Lactation Interventions   Attachment Promotion breastfeeding assistance provided;family involvement promoted;infant-mother separation minimized;rooming-in promoted;skin-to-skin contact encouraged   Breast Care: Breastfeeding milk massaged towards nipple   Breastfeeding Assistance both breasts offered each feeding;feeding cue recognition promoted;feeding on demand promoted;infant latch-on verified;infant suck/swallow verified;assisted with positioning;support offered   Maternal Breastfeeding Support lactation counseling provided;encouragement offered   Latch Promotion other (see comments)  (rolled infant into the breast)     "

## 2018-03-20 NOTE — LACTATION NOTE
Met with mother per her request.     Baby is showing feeding cues. Helped mother to settle in a cross cradle hold position on the right breast. Reviewed deep asymmetric latch and proper positioning. Mother is able to demonstrate back and deep latch easily obtained. Audible swallows noted, and mother denies pain or discomfort. Baby fed until content, and nipple shape and color is WDL upon unlatching. Reviewed hand expression and nipple care; mother able to return back demonstration.     Discussed expected  behaviors and output for the first 48 hours of life.  Discussed the importance of cue based feedings on demand, unrestricted access to the breast, and frequent uninterrupted skin to skin contact.  Risk and implications of artificial nipples and supplementation discussed.  Encouraged mother to call for assistance when desired or when infant is showing signs of hunger, contact number provided, mother verbalizes understanding.

## 2018-03-20 NOTE — PLAN OF CARE
Problem: Patient Care Overview  Goal: Plan of Care Review  Outcome: Ongoing (interventions implemented as appropriate)  Pt progressing well. No issues noted currently. Pain controlled with po meds. Fundus firm with light lochia. Ambulating and voiding without difficulty. Visited baby several times in NICU to breastfeed and bond. Vitals stable. Will continue to monitor.

## 2018-03-20 NOTE — SUBJECTIVE & OBJECTIVE
Hospital course: Admit to LD  Expectant management, pt. Would like NCB in tub   3/19/18 , routine pp care  3/20/18 ppd 1 routine care          She is doing well this morning. She is tolerating a regular diet without nausea or vomiting. She is voiding spontaneously. She is ambulating. She has passed flatus, and has a BM. Vaginal bleeding is mild. She denies fever or chills. Abdominal pain is mild and controlled with oral medications. She is breastfeeding. She desires circumcision for her male baby: yes.    Objective:     Vital Signs (Most Recent):  Temp: 98.3 °F (36.8 °C) (18)  Pulse: 81 (18)  Resp: 18 (18)  BP: (!) 108/59 (18) Vital Signs (24h Range):  Temp:  [97.8 °F (36.6 °C)-98.3 °F (36.8 °C)] 98.3 °F (36.8 °C)  Pulse:  [73-86] 81  Resp:  [18] 18  BP: (108-117)/(57-69) 108/59     Weight: 108.4 kg (239 lb)  Body mass index is 43.71 kg/m².      Intake/Output Summary (Last 24 hours) at 18 1510  Last data filed at 18 1730   Gross per 24 hour   Intake                0 ml   Output              350 ml   Net             -350 ml       Significant Labs:  Lab Results   Component Value Date    GROUPTRH A POS 2018    HEPBSAG Negative 2017    STREPBCULT  2018     STREPTOCOCCUS AGALACTIAE (GROUP B)  Beta-hemolytic streptococci are routinely susceptible to   penicillins,cephalosporins and carbapenems.         Recent Labs  Lab 18  0647   HGB 9.4*   HCT 30.5*       I have personallly reviewed all pertinent lab results from the last 24 hours.    Physical Exam:   Constitutional: She is oriented to person, place, and time. She appears well-developed and well-nourished.    HENT:   Head: Normocephalic.    Eyes: Pupils are equal, round, and reactive to light.    Neck: Normal range of motion.     Pulmonary/Chest: Effort normal.        Abdominal: Soft.             Musculoskeletal: Normal range of motion.       Neurological: She is alert and oriented to  person, place, and time.    Skin: Skin is warm and dry.    Psychiatric: She has a normal mood and affect. Her behavior is normal. Judgment and thought content normal.

## 2018-03-20 NOTE — PROGRESS NOTES
[facility] -   Obstetrics  Postpartum Progress Note    Patient Name: Padmini Wren  MRN: [id number]  Admission Date: 3/19/2018  Hospital Length of Stay: 1 days  Attending Physician: KUSH Delgado MD  Primary Care Provider: Taryn Salcido MD    Subjective:     Principal Problem: (normal spontaneous vaginal delivery)    Hospital course: Admit to LD  Expectant management, pt. Would like NCB in tub   3/19/18 , routine pp care  3/20/18 ppd 1 routine care          She is doing well this morning. She is tolerating a regular diet without nausea or vomiting. She is voiding spontaneously. She is ambulating. She has passed flatus, and has a BM. Vaginal bleeding is mild. She denies fever or chills. Abdominal pain is mild and controlled with oral medications. She is breastfeeding. She desires circumcision for her male baby: yes.    Objective:     Vital Signs (Most Recent):  Temp: 98.3 °F (36.8 °C) (18 0730)  Pulse: 81 (18 0730)  Resp: 18 (18 0730)  BP: (!) 108/59 (18 0730) Vital Signs (24h Range):  Temp:  [97.8 °F (36.6 °C)-98.3 °F (36.8 °C)] 98.3 °F (36.8 °C)  Pulse:  [73-86] 81  Resp:  [18] 18  BP: (108-117)/(57-69) 108/59     Weight: 108.4 kg (239 lb)  Body mass index is 43.71 kg/m².      Intake/Output Summary (Last 24 hours) at 18 1510  Last data filed at 18 1730   Gross per 24 hour   Intake                0 ml   Output              350 ml   Net             -350 ml       Significant Labs:  Lab Results   Component Value Date    GROUPTRH A POS 2018    HEPBSAG Negative 2017    STREPBCULT  2018     STREPTOCOCCUS AGALACTIAE (GROUP B)  Beta-hemolytic streptococci are routinely susceptible to   penicillins,cephalosporins and carbapenems.         Recent Labs  Lab 18  0647   HGB 9.4*   HCT 30.5*       I have personallly reviewed all pertinent lab results from the last 24 hours.    Physical Exam:   Constitutional: She is  oriented to person, place, and time. She appears well-developed and well-nourished.    HENT:   Head: Normocephalic.    Eyes: Pupils are equal, round, and reactive to light.    Neck: Normal range of motion.     Pulmonary/Chest: Effort normal.        Abdominal: Soft.             Musculoskeletal: Normal range of motion.       Neurological: She is alert and oriented to person, place, and time.    Skin: Skin is warm and dry.    Psychiatric: She has a normal mood and affect. Her behavior is normal. Judgment and thought content normal.       Assessment/Plan:     23 y.o. female  for:    *  (normal spontaneous vaginal delivery)    Routine PP care        Perineal laceration    2nd degree perineal and L labial laceration.  Routine perineal care        GBS (group B Streptococcus carrier), +RV culture, currently pregnant    GBS prophylaxis             Disposition: As patient meets milestones, will plan to discharge 3/21/18.    Linda Holland CNM  Obstetrics  [facility]

## 2018-03-21 VITALS
HEIGHT: 62 IN | TEMPERATURE: 98 F | WEIGHT: 239 LBS | SYSTOLIC BLOOD PRESSURE: 126 MMHG | OXYGEN SATURATION: 97 % | DIASTOLIC BLOOD PRESSURE: 73 MMHG | BODY MASS INDEX: 43.98 KG/M2 | RESPIRATION RATE: 18 BRPM | HEART RATE: 79 BPM

## 2018-03-21 PROCEDURE — 25000003 PHARM REV CODE 250: Performed by: ADVANCED PRACTICE MIDWIFE

## 2018-03-21 PROCEDURE — 99238 HOSP IP/OBS DSCHRG MGMT 30/<: CPT | Mod: ,,, | Performed by: ADVANCED PRACTICE MIDWIFE

## 2018-03-21 RX ADMIN — HYDROCODONE BITARTRATE AND ACETAMINOPHEN 1 TABLET: 5; 325 TABLET ORAL at 11:03

## 2018-03-21 RX ADMIN — IBUPROFEN 600 MG: 600 TABLET, FILM COATED ORAL at 01:03

## 2018-03-21 RX ADMIN — HYDROCODONE BITARTRATE AND ACETAMINOPHEN 1 TABLET: 5; 325 TABLET ORAL at 01:03

## 2018-03-21 NOTE — DISCHARGE SUMMARY
[facility] -   Obstetrics  Discharge Summary      Patient Name: Padmini Wren  MRN: [id number]  Admission Date: 3/19/2018  Hospital Length of Stay: 2 days  Discharge Date and Time:  2018 8:09 AM  Attending Physician: KUSH Delgado MD   Discharging Provider: Anya Bro CNM  Primary Care Provider: Taryn Salcido MD    HPI: C/o contractions since 0400am    * No surgery found *     Hospital Course:   Admit to LD  Expectant management, pt. Would like NCB in tub   3/19/18 , routine pp care  3/20/18 ppd 1 routine care   3/20/18 ppd 2 routine care, d/c home        Final Active Diagnoses:    Diagnosis Date Noted POA    PRINCIPAL PROBLEM:   (normal spontaneous vaginal delivery) [O80] 2018 Not Applicable    GBS (group B Streptococcus carrier), +RV culture, currently pregnant [O99.820] 2018 Not Applicable    Single live  [Z38.2] 2018 No    Perineal laceration [S31.41XA] 2018 No    Anemia during pregnancy in third trimester [O99.013] 02/10/2018 Yes    Rheumatoid arthritis involving right knee with negative rheumatoid factor [M06.061] 2017 Yes      Problems Resolved During this Admission:    Diagnosis Date Noted Date Resolved POA    Normal labor [O80, Z37.9] 2018 Not Applicable            Feeding Method: breast    Immunizations     Date Immunization Status Dose Route/Site Given by    18 0703 MMR Deferred 0.5 mL Subcutaneous/Left deltoid Lacey Roberson RN    18 0703 Tdap Deferred 0.5 mL Intramuscular/Left deltoid Lacey Roberson RN          Delivery:    Episiotomy: None   Lacerations: 2nd   Repair suture:     Repair # of packets: 2   Blood loss (ml): 250     Birth information:  YOB: 2017   Time of birth: 7:53 AM   Sex: male   Delivery type: Vaginal, Spontaneous Delivery   Gestational Age: 39w6d    Delivery Clinician:      Other providers:       Additional   information:  Forceps:    Vacuum:    Breech:    Observed anomalies      Living?:           APGARS  One minute Five minutes Ten minutes   Skin color:         Heart rate:         Grimace:         Muscle tone:         Breathing:         Totals: 8  9        Placenta: Delivered:       appearance    Pending Diagnostic Studies:     None          Discharged Condition: good    Disposition: Home or Self Care    Follow Up:  Follow-up Information     Lilia Gomez CNM In 4 weeks.    Specialty:  Obstetrics  Contact information:  [address] [facility] [address] [phone]                 Patient Instructions:     Notify your health care provider if you experience any of the following:  temperature >100.4     Notify your health care provider if you experience any of the following:  severe uncontrolled pain       Medications:  There are no discharge medications for this patient.      Anya Bro CNM  Obstetrics  [facility]

## 2018-03-21 NOTE — PROGRESS NOTES
"Called to pt room after pt c/o lightheaded, dizziness and feeling "cloudy" while changing the baby's diaper. Pt states she was sitting in the bed when it happened. Vitals sign taken and WNL (see flowsheet), CBC was normal this AM, lochia is scant to light and pt has a good appetite. Pt admitted she hasn't really drank fluids today; reminded of importance of staying hydrated especially while breastfeeding. Pt was brought two cups of water and juice. Instructed to notify nurse if symptoms continue or get worse. Will continue to monitor.   "

## 2018-03-21 NOTE — LACTATION NOTE
This note was copied from a baby's chart.  Mother stated she hasn't been keeping up with baby's feedings. She said he eats about every hour and switches from right to left breast and may feed for about 15-20 minutes on each side but sometimes only does one breast per feeding. Encouraged mother to try her best to document feedings, wets and dirties.

## 2018-03-21 NOTE — PLAN OF CARE
Problem: Patient Care Overview  Goal: Plan of Care Review  Outcome: Ongoing (interventions implemented as appropriate)  Pt progressing well. No issues noted currently. Pain controlled with po meds. Fundus firm with scant to light lochia. Ambulating and voiding without difficulty. Vitals stable. Will continue to monitor.

## 2018-03-21 NOTE — LACTATION NOTE
Lactation rounds. Lactation discharge instructions reviewed, including contact numbers and available resources. Mother reports feedings are going well and denies pain. Reviewed what to expect as milk is coming in, how to tell baby is getting enough, manual expression of breastmilk, cue based feeding on demand, skin to skin, etc. Encouraged to call with any questions or concerns. Mother voices understanding.     03/21/18 1130   Maternal Infant Assessment   Breast Shape round   Breast Density filling   Areola elastic   Nipple(s) everted   Infant Assessment   Sucking Reflex present   Rooting Reflex present   Swallow Reflex present   LATCH Score   Latch 2-->grasps breast, tongue down, lips flanged, rhythmic sucking   Audible Swallowing 2-->spontaneous and intermittent (24 hrs old)   Type Of Nipple 2-->everted (after stimulation)   Comfort (Breast/Nipple) 2-->soft/nontender   Hold (Positioning) 2-->no assist from staff, mother able to position/hold infant   Score (less than 7 for 2/more consecutive times, consult Lactation Consultant) 10   Maternal Infant Feeding   Maternal Emotional State relaxed;independent   Time Spent (min) 30-60 min   Latch Assistance no   Feeding Infant   Effective Latch During Feeding yes   Audible Swallow yes   Suck/Swallow Coordination present   Lactation Interventions   Attachment Promotion counseling provided;skin-to-skin contact encouraged   Breastfeeding Assistance feeding cue recognition promoted;feeding on demand promoted;feeding session observed;infant latch-on verified;infant suck/swallow verified;support offered   Maternal Breastfeeding Support encouragement offered;lactation counseling provided

## 2018-03-21 NOTE — DISCHARGE INSTRUCTIONS
"Mother Self Care:    Activity: Avoid strenuous exercise and get adequate rest.  No driving until the physician consent given.  Emotional Changes: Most women find birth to be a time of great emotional upheaval.  Sense of loss, mood swings, fatigue, anxiety, and feeling "let down" are common.  If feelings worsen or last more than a week, call your physician.  Breast Care/Breastfeeding: Wear a bra for comfort.  Keep nipples dry and apply your own breast milk or lanolin cream as needed for soreness.  Engorgement can be relieved with warm, moist heat before feedings.  You may also take Ibuprofen.  Nimesh-Care/Vaginal Bleeding: Remember to use your nimesh-bottle after urinating.  Your flow will change from red, to pink, to yellow/white color over a period of 2 weeks.  Menstruation will return in 3-8 weeks, or longer if breastfeeding.  Episiotomy Vaginal Delivery: Stitches will dissolve within 10 days to 3 weeks.  Warm baths, tucks, and dermoplast will promote healing.  Avoid bubble baths or strong soaps.  Sexual Activity/Pelvic Rest: No sexual activity, tampons, or douching until your physician gives you consent.  Diet: Continue to eat from the five basic food groups, including plenty of protein, fruits, vegetables, and whole grains.  Limit empty calories and high fat foods.  Drink enough fluids to satisfy thirst and add an extra 500 calories for breastfeeding.  Constipation/Hemorrhoids: Drink plenty of water.  You may take a stool softener or natural laxative (Metamucil). You may use tucks or hemorrhoid ointment and soak in a warm tub.    CALL YOUR OB DOCTOR IF ANY OF THE FOLLOWING OCCURS:  *Heavy bleeding - saturating a pad an hour or passing any large (2-3 inches in size) blood clots.  *Any pain, redness, or tenderness in lower leg.  *You cannot care for yourself or your baby.  *Any signs of infection-      - Temperature greater than 100.5 degrees F      - Foul smelling vaginal discharge       - Increased episiotomy  " pain      - Hot, hard, red or sore area on breast      - Flu-like symptoms      - Any urgency, frequency or burning with urination

## 2019-04-17 ENCOUNTER — INITIAL PRENATAL (OUTPATIENT)
Dept: OBSTETRICS AND GYNECOLOGY | Facility: CLINIC | Age: 24
End: 2019-04-17
Payer: MEDICAID

## 2019-04-17 VITALS
DIASTOLIC BLOOD PRESSURE: 76 MMHG | SYSTOLIC BLOOD PRESSURE: 118 MMHG | BODY MASS INDEX: 40.08 KG/M2 | WEIGHT: 219.13 LBS

## 2019-04-17 DIAGNOSIS — O99.210 OBESITY AFFECTING PREGNANCY, ANTEPARTUM: Primary | ICD-10-CM

## 2019-04-17 DIAGNOSIS — O99.011 ANEMIA DURING PREGNANCY IN FIRST TRIMESTER: ICD-10-CM

## 2019-04-17 DIAGNOSIS — O26.841 UTERINE SIZE DATE DISCREPANCY, FIRST TRIMESTER: ICD-10-CM

## 2019-04-17 PROBLEM — Z34.93 ENCOUNTER FOR SUPERVISION OF NORMAL PREGNANCY IN THIRD TRIMESTER: Status: RESOLVED | Noted: 2017-08-15 | Resolved: 2019-04-17

## 2019-04-17 PROBLEM — N89.8 VAGINAL LESION: Status: RESOLVED | Noted: 2018-02-10 | Resolved: 2019-04-17

## 2019-04-17 PROBLEM — O47.9 THREATENED LABOR AT TERM: Status: RESOLVED | Noted: 2018-03-14 | Resolved: 2019-04-17

## 2019-04-17 PROBLEM — Z87.440 HISTORY OF GBS (GROUP B STREPTOCOCCUS) UTI, CURRENTLY PREGNANT, FIRST TRIMESTER: Status: ACTIVE | Noted: 2018-03-19

## 2019-04-17 PROBLEM — O09.891 HISTORY OF GBS (GROUP B STREPTOCOCCUS) UTI, CURRENTLY PREGNANT, FIRST TRIMESTER: Status: ACTIVE | Noted: 2018-03-19

## 2019-04-17 PROCEDURE — 99999 PR PBB SHADOW E&M-EST. PATIENT-LVL II: CPT | Mod: PBBFAC,,, | Performed by: ADVANCED PRACTICE MIDWIFE

## 2019-04-17 PROCEDURE — 99999 PR PBB SHADOW E&M-EST. PATIENT-LVL II: ICD-10-PCS | Mod: PBBFAC,,, | Performed by: ADVANCED PRACTICE MIDWIFE

## 2019-04-17 PROCEDURE — 99212 OFFICE O/P EST SF 10 MIN: CPT | Mod: PBBFAC,TH | Performed by: ADVANCED PRACTICE MIDWIFE

## 2019-04-17 PROCEDURE — 99213 OFFICE O/P EST LOW 20 MIN: CPT | Mod: TH,S$PBB,, | Performed by: ADVANCED PRACTICE MIDWIFE

## 2019-04-17 PROCEDURE — 87491 CHLMYD TRACH DNA AMP PROBE: CPT

## 2019-04-17 PROCEDURE — 99213 PR OFFICE/OUTPT VISIT, EST, LEVL III, 20-29 MIN: ICD-10-PCS | Mod: TH,S$PBB,, | Performed by: ADVANCED PRACTICE MIDWIFE

## 2019-04-18 LAB
C TRACH DNA SPEC QL NAA+PROBE: DETECTED
N GONORRHOEA DNA SPEC QL NAA+PROBE: NOT DETECTED

## 2019-04-18 NOTE — PROGRESS NOTES
CHIEF COMPLAINT:   Patient presents with      Initial OB       HISTORY OF PRESENT ILLNESS  Padmini JUANIS Wren 24 y.o.  presents for initial OB. The pt was seen  at [facility], c/o vaginal bleeding, states US done nothing was seen intrauterine, labs, cultures and urine done, DANIEL signed and sent for records.   The patient has no complaints today.  No nausea or vomiting. No bleeding or pain.  Pregnancy was not planned but is desired.  Partner is supportive of pregnancy.  Lives at home with her children.  No cats at home.  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  Denies domestic abuse.  Denies chemical/pesticide/radiation exposure.    OB history:    LMP: No LMP recorded. Patient is pregnant.  EDC: Estimated Date of Delivery: 19  EGA: 11w2d      Health Maintenance   Topic Date Due    HPV Vaccines (1 - Female 3-dose series) 03/15/2011    Influenza Vaccine  2018    CHLAMYDIA SCREENING  2019    Pap Smear  2020    TETANUS VACCINE  2027    Lipid Panel  Completed       Past Medical History:   Diagnosis Date    Anxiety     Arthritis     Asthma     JRA (juvenile rheumatoid arthritis)     Seizures        Past Surgical History:   Procedure Laterality Date    ANKLE FRACTURE SURGERY      CYST REMOVAL      tubes ears         Family History   Problem Relation Age of Onset    Inflammatory bowel disease Mother     Asthma Mother     Asthma Brother     Asthma Brother        Social History     Socioeconomic History    Marital status:      Spouse name: Not on file    Number of children: Not on file    Years of education: Not on file    Highest education level: Not on file   Occupational History    Not on file   Social Needs    Financial resource strain: Not on file    Food insecurity:     Worry: Not on file     Inability: Not on file    Transportation needs:     Medical: Not on file     Non-medical: Not on  file   Tobacco Use    Smoking status: Never Smoker    Smokeless tobacco: Never Used   Substance and Sexual Activity    Alcohol use: Not Currently    Drug use: No    Sexual activity: Yes     Partners: Male     Birth control/protection: None   Lifestyle    Physical activity:     Days per week: Not on file     Minutes per session: Not on file    Stress: Not on file   Relationships    Social connections:     Talks on phone: Not on file     Gets together: Not on file     Attends [employer] service: Not on file     Active member of club or organization: Not on file     Attends meetings of clubs or organizations: Not on file     Relationship status: Not on file   Other Topics Concern    Not on file   Social History Narrative    ** Merged History Encounter **            No current outpatient medications on file.     No current facility-administered medications for this visit.        Review of patient's allergies indicates:  No Known Allergies      PHYSICAL EXAM   Vitals:    04/17/19 1548   BP: 118/76        PAIN SCALE: 0/10 None    PHYSICAL EXAM    ROS:  GENERAL: No fever, chills, fatigability or weight loss.  CV: Denies chest pain  PULM: Denies shortness of breath or wheezing.  ABDOMEN: Appetite fine. No weight loss. Denies diarrhea, abdominal pain, hematemesis or blood in stool.  URINARY: No flank pain, dysuria or hematuria.  REPRODUCTIVE: No abnormal vaginal bleeding.       PE:   APPEARANCE: Well nourished, well developed, in no acute distress  CHEST: Clear to auscultation bilaterally  CV: Regular rate and rhythm  BREASTS: Symmetrical, no skin changes or visible lesions. No palpable masses, nipple discharge or adenopathy bilaterally.  ABDOMEN: Soft. No tenderness or masses. No hepatosplenomegaly. No hernias  PELVIC: deferred    UPT +    A/P:     -      Patient was counseled today on A.C.S. Pap guidelines and recommendations for yearly pelvic exams, mammograms and monthly self breast exams; to see her PCP for  other health maintenance and pregnancy.   -      Patient's medications and medical history reviewed with patient and implications in pregnancy.   -      Pregnancy course discussed and 'AtoZ' book given. Patient was counseled on proper weight gain based on the [place] [employer]'s recommendations based on her pre-pregnancy weight. Discussed foods to avoid in pregnancy (i.e. sushi, fish that are high in mercury, deli meat, and unpasteurized cheeses). Discussed prenatal vitamin options (i.e. stool softener, DHA). Discussed potential medical problems in pregnancy.  -     Discussed risk of Toxoplasmosis transmission from pets and reviewed risk reduction techniques.  -     Pt was counseled on exercise in pregnancy and weight gain recommendations.  -     Pt was counseled on travel recommendations and on risks of Zika virus exposure.  Current CDC Zika advisories and prevention techniques were reviewed with pt.  Pt denies any recent international travel and does not plan travel during pregnancy.  Pt reports that partner does not plan travel either.  -     Oriented to practice including CNM collaboration.   -     Follow-up routine OB, US. Labs requested. al

## 2019-04-19 ENCOUNTER — TELEPHONE (OUTPATIENT)
Dept: OBSTETRICS AND GYNECOLOGY | Facility: HOSPITAL | Age: 24
End: 2019-04-19

## 2019-04-19 DIAGNOSIS — A74.9 CHLAMYDIA INFECTION AFFECTING PREGNANCY IN FIRST TRIMESTER: ICD-10-CM

## 2019-04-19 DIAGNOSIS — O98.811 CHLAMYDIA INFECTION AFFECTING PREGNANCY IN FIRST TRIMESTER: ICD-10-CM

## 2019-04-19 PROBLEM — O98.819 CHLAMYDIA INFECTION AFFECTING PREGNANCY: Status: ACTIVE | Noted: 2019-04-19

## 2019-04-19 RX ORDER — AZITHROMYCIN 500 MG/1
1000 TABLET, FILM COATED ORAL ONCE
Qty: 2 TABLET | Refills: 0 | Status: SHIPPED | OUTPATIENT
Start: 2019-04-19 | End: 2019-04-19

## 2019-04-22 NOTE — TELEPHONE ENCOUNTER
Attempted to contact patient, phone number is not a good number. Will attempt to contact patient again.

## 2019-04-24 NOTE — TELEPHONE ENCOUNTER
No answer at home number, no other numbers in chart.  Letter mailed to pt's current address.  VB, LPN

## 2019-04-26 ENCOUNTER — TELEPHONE (OUTPATIENT)
Dept: OBSTETRICS AND GYNECOLOGY | Facility: CLINIC | Age: 24
End: 2019-04-26

## 2019-04-26 DIAGNOSIS — O98.811 CHLAMYDIA INFECTION AFFECTING PREGNANCY IN FIRST TRIMESTER: Primary | ICD-10-CM

## 2019-04-26 DIAGNOSIS — A74.9 CHLAMYDIA INFECTION AFFECTING PREGNANCY IN FIRST TRIMESTER: Primary | ICD-10-CM

## 2019-04-26 RX ORDER — AZITHROMYCIN 250 MG/1
1000 TABLET, FILM COATED ORAL ONCE
Qty: 4 TABLET | Refills: 0 | Status: SHIPPED | OUTPATIENT
Start: 2019-04-26 | End: 2019-04-26

## 2019-04-30 ENCOUNTER — TELEPHONE (OUTPATIENT)
Dept: OBSTETRICS AND GYNECOLOGY | Facility: CLINIC | Age: 24
End: 2019-04-30

## 2019-04-30 NOTE — TELEPHONE ENCOUNTER
----- Message from Joseph Rogers CNM sent at 4/30/2019  9:42 AM CDT -----  Contact: [phone]      ----- Message -----  From: Connie Jennings  Sent: 4/30/2019   7:51 AM  To: Richar Vernon CNM    Would like to consult with the nurse, patient has some question about some papers she received, concerning some test result, patient would like to speak with the nurse concerning this, please call back at [phone]  .Type:  Test Results    Who Called:  Ms mcgee  Name of Test (Lab/Mammo/Etc):   Date of Test:   Ordering Provider:  karen  Where the test was performed: [facility]  Would the patient rather a call back or a response via [facility]? Call back  Best Call Back Number: [phone]  Additional Information:

## 2019-04-30 NOTE — TELEPHONE ENCOUNTER
Patient called back and informed  Of +chlamydia. She will  and Rx  and her partner will need to see a provider pcp  for treatment. Avoid intercourse until 4 days after both are treated. Patient verbalized and will keep her a[[t as scheduled tf

## 2019-05-06 ENCOUNTER — ROUTINE PRENATAL (OUTPATIENT)
Dept: OBSTETRICS AND GYNECOLOGY | Facility: CLINIC | Age: 24
End: 2019-05-06
Payer: MEDICAID

## 2019-05-06 ENCOUNTER — PROCEDURE VISIT (OUTPATIENT)
Dept: OBSTETRICS AND GYNECOLOGY | Facility: CLINIC | Age: 24
End: 2019-05-06
Payer: MEDICAID

## 2019-05-06 ENCOUNTER — LAB VISIT (OUTPATIENT)
Dept: LAB | Facility: HOSPITAL | Age: 24
End: 2019-05-06
Attending: FAMILY MEDICINE
Payer: MEDICAID

## 2019-05-06 VITALS
WEIGHT: 218.94 LBS | BODY MASS INDEX: 40.04 KG/M2 | SYSTOLIC BLOOD PRESSURE: 124 MMHG | DIASTOLIC BLOOD PRESSURE: 80 MMHG

## 2019-05-06 DIAGNOSIS — O26.841 UTERINE SIZE DATE DISCREPANCY, FIRST TRIMESTER: ICD-10-CM

## 2019-05-06 DIAGNOSIS — O99.210 OBESITY AFFECTING PREGNANCY, ANTEPARTUM: ICD-10-CM

## 2019-05-06 DIAGNOSIS — Z3A.15 15 WEEKS GESTATION OF PREGNANCY: ICD-10-CM

## 2019-05-06 DIAGNOSIS — O98.819 CHLAMYDIA INFECTION DURING PREGNANCY: Primary | ICD-10-CM

## 2019-05-06 DIAGNOSIS — Z36.3 ANTENATAL SCREENING FOR MALFORMATION USING ULTRASONICS: ICD-10-CM

## 2019-05-06 DIAGNOSIS — A74.9 CHLAMYDIA INFECTION DURING PREGNANCY: Primary | ICD-10-CM

## 2019-05-06 LAB
ABO + RH BLD: NORMAL
BASOPHILS # BLD AUTO: 0.03 K/UL (ref 0–0.2)
BASOPHILS NFR BLD: 0.2 % (ref 0–1.9)
BLD GP AB SCN CELLS X3 SERPL QL: NORMAL
DIFFERENTIAL METHOD: ABNORMAL
EOSINOPHIL # BLD AUTO: 0.2 K/UL (ref 0–0.5)
EOSINOPHIL NFR BLD: 1.3 % (ref 0–8)
ERYTHROCYTE [DISTWIDTH] IN BLOOD BY AUTOMATED COUNT: 13.7 % (ref 11.5–14.5)
HCT VFR BLD AUTO: 38.6 % (ref 37–48.5)
HGB BLD-MCNC: 12.5 G/DL (ref 12–16)
IMM GRANULOCYTES # BLD AUTO: 0.06 K/UL (ref 0–0.04)
IMM GRANULOCYTES NFR BLD AUTO: 0.4 % (ref 0–0.5)
LYMPHOCYTES # BLD AUTO: 3.4 K/UL (ref 1–4.8)
LYMPHOCYTES NFR BLD: 25.1 % (ref 18–48)
MCH RBC QN AUTO: 29.3 PG (ref 27–31)
MCHC RBC AUTO-ENTMCNC: 32.4 G/DL (ref 32–36)
MCV RBC AUTO: 90 FL (ref 82–98)
MONOCYTES # BLD AUTO: 0.8 K/UL (ref 0.3–1)
MONOCYTES NFR BLD: 5.7 % (ref 4–15)
NEUTROPHILS # BLD AUTO: 9.2 K/UL (ref 1.8–7.7)
NEUTROPHILS NFR BLD: 67.3 % (ref 38–73)
NRBC BLD-RTO: 0 /100 WBC
PLATELET # BLD AUTO: 304 K/UL (ref 150–350)
PMV BLD AUTO: 10.6 FL (ref 9.2–12.9)
RBC # BLD AUTO: 4.27 M/UL (ref 4–5.4)
WBC # BLD AUTO: 13.66 K/UL (ref 3.9–12.7)

## 2019-05-06 PROCEDURE — 85025 COMPLETE CBC W/AUTO DIFF WBC: CPT

## 2019-05-06 PROCEDURE — 86901 BLOOD TYPING SEROLOGIC RH(D): CPT

## 2019-05-06 PROCEDURE — 86703 HIV-1/HIV-2 1 RESULT ANTBDY: CPT

## 2019-05-06 PROCEDURE — 87340 HEPATITIS B SURFACE AG IA: CPT

## 2019-05-06 PROCEDURE — 99212 OFFICE O/P EST SF 10 MIN: CPT | Mod: PBBFAC,25,TH | Performed by: MIDWIFE

## 2019-05-06 PROCEDURE — 99212 PR OFFICE/OUTPT VISIT, EST, LEVL II, 10-19 MIN: ICD-10-PCS | Mod: TH,S$PBB,, | Performed by: MIDWIFE

## 2019-05-06 PROCEDURE — 76816 PR  US,PREGNANT UTERUS,F/U,TRANSABD APP: ICD-10-PCS | Mod: 26,S$PBB,, | Performed by: OBSTETRICS & GYNECOLOGY

## 2019-05-06 PROCEDURE — 76816 OB US FOLLOW-UP PER FETUS: CPT | Mod: 26,S$PBB,, | Performed by: OBSTETRICS & GYNECOLOGY

## 2019-05-06 PROCEDURE — 86592 SYPHILIS TEST NON-TREP QUAL: CPT

## 2019-05-06 PROCEDURE — 99999 PR PBB SHADOW E&M-EST. PATIENT-LVL II: CPT | Mod: PBBFAC,,, | Performed by: MIDWIFE

## 2019-05-06 PROCEDURE — 86762 RUBELLA ANTIBODY: CPT

## 2019-05-06 PROCEDURE — 99999 PR PBB SHADOW E&M-EST. PATIENT-LVL II: ICD-10-PCS | Mod: PBBFAC,,, | Performed by: MIDWIFE

## 2019-05-06 PROCEDURE — 99212 OFFICE O/P EST SF 10 MIN: CPT | Mod: TH,S$PBB,, | Performed by: MIDWIFE

## 2019-05-06 PROCEDURE — 76816 OB US FOLLOW-UP PER FETUS: CPT | Mod: PBBFAC | Performed by: OBSTETRICS & GYNECOLOGY

## 2019-05-06 PROCEDURE — 36415 COLL VENOUS BLD VENIPUNCTURE: CPT

## 2019-05-06 RX ORDER — AZITHROMYCIN 500 MG/1
1000 TABLET, FILM COATED ORAL DAILY
Qty: 4 TABLET | Refills: 0 | Status: SHIPPED | OUTPATIENT
Start: 2019-05-06 | End: 2019-05-08

## 2019-05-07 LAB
HBV SURFACE AG SERPL QL IA: NEGATIVE
HIV 1+2 AB+HIV1 P24 AG SERPL QL IA: NEGATIVE
RPR SER QL: NORMAL
RUBV IGG SER-ACNC: 13.6 IU/ML
RUBV IGG SER-IMP: REACTIVE

## 2019-05-09 NOTE — PROGRESS NOTES
"24 y.o. female  at 15w2d LMP  not feeling flutters/FM, denies VB, LOF or cramping  Doing well without concerns   TWG: -3.5 lbs   Reviewed prenatal labs  Anatomy US ordered  Reviewed + chlamydia, did not  meds, rx sent again to treat pt and partner, states he is "illegal", intercourse precautions given  Reviewed warning signs, normal FM, pregnancy precautions and how/when to call.  RTC x 4 wks, call or present sooner prn.           "

## 2019-06-12 ENCOUNTER — ROUTINE PRENATAL (OUTPATIENT)
Dept: OBSTETRICS AND GYNECOLOGY | Facility: CLINIC | Age: 24
End: 2019-06-12
Payer: MEDICAID

## 2019-06-12 ENCOUNTER — PROCEDURE VISIT (OUTPATIENT)
Dept: OBSTETRICS AND GYNECOLOGY | Facility: CLINIC | Age: 24
End: 2019-06-12
Payer: MEDICAID

## 2019-06-12 VITALS
BODY MASS INDEX: 40.56 KG/M2 | DIASTOLIC BLOOD PRESSURE: 60 MMHG | SYSTOLIC BLOOD PRESSURE: 114 MMHG | WEIGHT: 221.81 LBS

## 2019-06-12 DIAGNOSIS — O99.210 OBESITY AFFECTING PREGNANCY, ANTEPARTUM: Primary | ICD-10-CM

## 2019-06-12 DIAGNOSIS — Z86.19 HISTORY OF CHLAMYDIA: ICD-10-CM

## 2019-06-12 DIAGNOSIS — Z36.3 ANTENATAL SCREENING FOR MALFORMATION USING ULTRASONICS: ICD-10-CM

## 2019-06-12 DIAGNOSIS — Z3A.20 20 WEEKS GESTATION OF PREGNANCY: ICD-10-CM

## 2019-06-12 PROCEDURE — 87491 CHLMYD TRACH DNA AMP PROBE: CPT

## 2019-06-12 PROCEDURE — 76805 PR US, OB 14+WKS, TRANSABD, SINGLE GESTATION: ICD-10-PCS | Mod: 26,S$PBB,, | Performed by: OBSTETRICS & GYNECOLOGY

## 2019-06-12 PROCEDURE — 99212 PR OFFICE/OUTPT VISIT, EST, LEVL II, 10-19 MIN: ICD-10-PCS | Mod: TH,S$PBB,, | Performed by: MIDWIFE

## 2019-06-12 PROCEDURE — 76805 OB US >/= 14 WKS SNGL FETUS: CPT | Mod: 26,S$PBB,, | Performed by: OBSTETRICS & GYNECOLOGY

## 2019-06-12 PROCEDURE — 99999 PR PBB SHADOW E&M-EST. PATIENT-LVL II: ICD-10-PCS | Mod: PBBFAC,,, | Performed by: MIDWIFE

## 2019-06-12 PROCEDURE — 99212 OFFICE O/P EST SF 10 MIN: CPT | Mod: TH,S$PBB,, | Performed by: MIDWIFE

## 2019-06-12 PROCEDURE — 76805 OB US >/= 14 WKS SNGL FETUS: CPT | Mod: PBBFAC | Performed by: OBSTETRICS & GYNECOLOGY

## 2019-06-12 PROCEDURE — 99999 PR PBB SHADOW E&M-EST. PATIENT-LVL II: CPT | Mod: PBBFAC,,, | Performed by: MIDWIFE

## 2019-06-12 PROCEDURE — 99212 OFFICE O/P EST SF 10 MIN: CPT | Mod: PBBFAC,TH,25 | Performed by: MIDWIFE

## 2019-06-12 NOTE — PROGRESS NOTES
24 y.o. female  at 20w1d   Doing well without concerns   TW lbs   JARAD for chlamydia   Reviewed anatomy US, some sub opt views, will repeat at [facility], measures 1.5 weeks behind  Reviewed warning signs, normal FM/FKCs, labor precautions and how/when to call.  RTC x 4 wks, call or present sooner prn.

## 2019-06-13 LAB
C TRACH DNA SPEC QL NAA+PROBE: NOT DETECTED
N GONORRHOEA DNA SPEC QL NAA+PROBE: NOT DETECTED

## 2019-07-11 ENCOUNTER — PROCEDURE VISIT (OUTPATIENT)
Dept: OBSTETRICS AND GYNECOLOGY | Facility: CLINIC | Age: 24
End: 2019-07-11
Payer: MEDICAID

## 2019-07-11 ENCOUNTER — ROUTINE PRENATAL (OUTPATIENT)
Dept: OBSTETRICS AND GYNECOLOGY | Facility: CLINIC | Age: 24
End: 2019-07-11
Payer: MEDICAID

## 2019-07-11 VITALS
DIASTOLIC BLOOD PRESSURE: 74 MMHG | BODY MASS INDEX: 41.77 KG/M2 | WEIGHT: 228.38 LBS | SYSTOLIC BLOOD PRESSURE: 118 MMHG

## 2019-07-11 DIAGNOSIS — O98.812 CHLAMYDIA INFECTION AFFECTING PREGNANCY IN SECOND TRIMESTER: ICD-10-CM

## 2019-07-11 DIAGNOSIS — O99.210 OBESITY AFFECTING PREGNANCY, ANTEPARTUM: Primary | ICD-10-CM

## 2019-07-11 DIAGNOSIS — A74.9 CHLAMYDIA INFECTION AFFECTING PREGNANCY IN SECOND TRIMESTER: ICD-10-CM

## 2019-07-11 PROCEDURE — 76816 OB US FOLLOW-UP PER FETUS: CPT | Mod: PBBFAC | Performed by: OBSTETRICS & GYNECOLOGY

## 2019-07-11 PROCEDURE — 76816 PR  US,PREGNANT UTERUS,F/U,TRANSABD APP: ICD-10-PCS | Mod: 26,S$PBB,, | Performed by: OBSTETRICS & GYNECOLOGY

## 2019-07-11 PROCEDURE — 99999 PR PBB SHADOW E&M-EST. PATIENT-LVL II: ICD-10-PCS | Mod: PBBFAC,,, | Performed by: ADVANCED PRACTICE MIDWIFE

## 2019-07-11 PROCEDURE — 99999 PR PBB SHADOW E&M-EST. PATIENT-LVL II: CPT | Mod: PBBFAC,,, | Performed by: ADVANCED PRACTICE MIDWIFE

## 2019-07-11 PROCEDURE — 99213 PR OFFICE/OUTPT VISIT, EST, LEVL III, 20-29 MIN: ICD-10-PCS | Mod: TH,S$PBB,, | Performed by: ADVANCED PRACTICE MIDWIFE

## 2019-07-11 PROCEDURE — 99213 OFFICE O/P EST LOW 20 MIN: CPT | Mod: TH,S$PBB,, | Performed by: ADVANCED PRACTICE MIDWIFE

## 2019-07-11 PROCEDURE — 76816 OB US FOLLOW-UP PER FETUS: CPT | Mod: 26,S$PBB,, | Performed by: OBSTETRICS & GYNECOLOGY

## 2019-07-11 PROCEDURE — 99212 OFFICE O/P EST SF 10 MIN: CPT | Mod: PBBFAC,TH | Performed by: ADVANCED PRACTICE MIDWIFE

## 2019-07-11 NOTE — PROGRESS NOTES
Doing well, no complaints  Nexplanon ordered today, pt request for it to be placed in the hospital before discharge, aware of increase clotting risk with early placement and possible decrease milk supply  PTL precautions and fetal movement reviewed, when to go to hospital   Stressed hydration   28 week labs at [facility],  +  CDC recommendations and timeframes for Tdap reviewed, will offer at [facility]   Coffective counseling sheet Protect Breastfeeding discussed with mother. Reinforced avoidence of artifical nipples and formula, unless medically indicated.  Encouraged mother to download [employer] mobile ofelia if she has not already done so. Mother verbalizes understanding.

## 2019-07-19 ENCOUNTER — DOCUMENTATION ONLY (OUTPATIENT)
Dept: OBSTETRICS AND GYNECOLOGY | Facility: CLINIC | Age: 24
End: 2019-07-19

## 2019-08-08 ENCOUNTER — TELEPHONE (OUTPATIENT)
Dept: OBSTETRICS AND GYNECOLOGY | Facility: CLINIC | Age: 24
End: 2019-08-08

## 2019-08-08 NOTE — TELEPHONE ENCOUNTER
Returned call to patient.  She requested to reschedule her appt due to having no gas or transportation.  Offered several appts and different locations, she declined and asked for 08/26/19 in the am.  Appt 08/26/19 at 11:45 am, she accepted and confirmed.  Reminded her of labs scheduled the same day, and encouraged her to contact [facility] at least two days prior to appt, should she need transportation, she verbalized understanding.

## 2019-08-08 NOTE — TELEPHONE ENCOUNTER
----- Message from Lily Denton sent at 8/8/2019 10:04 AM CDT -----  Contact: pt  Please call pt @ [phone] regarding rescheduling appt for today, please cancel appts today.

## 2019-08-12 NOTE — PROGRESS NOTES
Attempted to reach patient via phone, left voice message to call office back.    *2 RESULTS NOTES ATTACHED BELOW*   US today EFW 29%, normal KRUPA. Was seen in LND a few days ago, on abx for UTI, BV, exp importance of completing full dose. S/s of labor discussed GBS collected. al

## 2019-09-16 ENCOUNTER — TELEPHONE (OUTPATIENT)
Dept: OBSTETRICS AND GYNECOLOGY | Facility: CLINIC | Age: 24
End: 2019-09-16

## 2019-09-16 NOTE — TELEPHONE ENCOUNTER
Spoke to patient. Assisted patient in scheduling appointment for tomorrow at 9:45 and will have dental form to give to patient at appointment. Patient verbalized understanding.

## 2019-09-16 NOTE — TELEPHONE ENCOUNTER
----- Message from Lillie Ness sent at 9/16/2019 11:13 AM CDT -----  Pt is requesting a call from nurse to discuss scheduling her apt.        Please call pt back at [phone]

## 2019-09-16 NOTE — LETTER
September 16, 2019    Padmini Mahoney Robert Wren  [address]  [facility] [address]              [facility]  [address] [facility] [address]  Phone: [phone]  Fax: [phone]      To Whom It May Concern:    Ms. Robert Wren is currently under our care for pregnancy.  Estimated Date of Delivery: 10/29/2019.    Any elective dental work should preferably be scheduled during or after the mid-trimester or postpartum.    Dental procedures can be performed with local anesthesia without epinephrine. Recommended antibiotics include penicillins, erythromycin, and cephalosporins. Tylenol #3 or Percocet may be used for pain control. No x-rays are allowed for routine screening. For dental problems, up to four x-rays may be taken with proper abdominal shield.    Sincerely,        Lilia Gomez CNM

## 2019-09-16 NOTE — TELEPHONE ENCOUNTER
----- Message from Crissy Knight sent at 9/16/2019  1:30 PM CDT -----  Contact: self  Type:  Needs Medical Advice    Who Called: pt  Symptoms (please be specific):n/a   How long has patient had these symptoms: n/a  Pharmacy name and phone #: n/a  Would the patient rather a call back or a response via [facility]? Call back  Best Call Back Number: [phone]  Additional Information: requesting call back regarding getting paper stating pt can have dental work done.    Thanks,  Crissy Knight

## 2019-09-17 ENCOUNTER — ROUTINE PRENATAL (OUTPATIENT)
Dept: OBSTETRICS AND GYNECOLOGY | Facility: CLINIC | Age: 24
End: 2019-09-17
Payer: MEDICAID

## 2019-09-17 ENCOUNTER — LAB VISIT (OUTPATIENT)
Dept: LAB | Facility: HOSPITAL | Age: 24
End: 2019-09-17
Attending: ADVANCED PRACTICE MIDWIFE
Payer: MEDICAID

## 2019-09-17 VITALS
SYSTOLIC BLOOD PRESSURE: 126 MMHG | BODY MASS INDEX: 42.46 KG/M2 | WEIGHT: 232.13 LBS | DIASTOLIC BLOOD PRESSURE: 62 MMHG

## 2019-09-17 DIAGNOSIS — Z34.83 SUPERVISION OF NORMAL INTRAUTERINE PREGNANCY IN MULTIGRAVIDA IN THIRD TRIMESTER: Primary | ICD-10-CM

## 2019-09-17 DIAGNOSIS — O99.213 OBESITY AFFECTING PREGNANCY IN THIRD TRIMESTER: ICD-10-CM

## 2019-09-17 DIAGNOSIS — O26.843 UTERINE SIZE DATE DISCREPANCY PREGNANCY, THIRD TRIMESTER: ICD-10-CM

## 2019-09-17 DIAGNOSIS — Z3A.34 34 WEEKS GESTATION OF PREGNANCY: ICD-10-CM

## 2019-09-17 DIAGNOSIS — O99.210 OBESITY AFFECTING PREGNANCY, ANTEPARTUM: ICD-10-CM

## 2019-09-17 DIAGNOSIS — O09.33 INSUFFICIENT PRENATAL CARE IN THIRD TRIMESTER: ICD-10-CM

## 2019-09-17 LAB
BASOPHILS # BLD AUTO: 0.03 K/UL (ref 0–0.2)
BASOPHILS NFR BLD: 0.2 % (ref 0–1.9)
DIFFERENTIAL METHOD: ABNORMAL
EOSINOPHIL # BLD AUTO: 0.3 K/UL (ref 0–0.5)
EOSINOPHIL NFR BLD: 2 % (ref 0–8)
ERYTHROCYTE [DISTWIDTH] IN BLOOD BY AUTOMATED COUNT: 13.6 % (ref 11.5–14.5)
GLUCOSE SERPL-MCNC: 125 MG/DL (ref 70–140)
HCT VFR BLD AUTO: 35.5 % (ref 37–48.5)
HGB BLD-MCNC: 10.9 G/DL (ref 12–16)
IMM GRANULOCYTES # BLD AUTO: 0.24 K/UL (ref 0–0.04)
IMM GRANULOCYTES NFR BLD AUTO: 1.6 % (ref 0–0.5)
LYMPHOCYTES # BLD AUTO: 2.8 K/UL (ref 1–4.8)
LYMPHOCYTES NFR BLD: 19 % (ref 18–48)
MCH RBC QN AUTO: 28.5 PG (ref 27–31)
MCHC RBC AUTO-ENTMCNC: 30.7 G/DL (ref 32–36)
MCV RBC AUTO: 93 FL (ref 82–98)
MONOCYTES # BLD AUTO: 0.8 K/UL (ref 0.3–1)
MONOCYTES NFR BLD: 5.5 % (ref 4–15)
NEUTROPHILS # BLD AUTO: 10.5 K/UL (ref 1.8–7.7)
NEUTROPHILS NFR BLD: 71.7 % (ref 38–73)
NRBC BLD-RTO: 0 /100 WBC
PLATELET # BLD AUTO: 292 K/UL (ref 150–350)
PMV BLD AUTO: 10.1 FL (ref 9.2–12.9)
RBC # BLD AUTO: 3.82 M/UL (ref 4–5.4)
WBC # BLD AUTO: 14.6 K/UL (ref 3.9–12.7)

## 2019-09-17 PROCEDURE — 99999 PR PBB SHADOW E&M-EST. PATIENT-LVL II: ICD-10-PCS | Mod: PBBFAC,,, | Performed by: MIDWIFE

## 2019-09-17 PROCEDURE — 99212 PR OFFICE/OUTPT VISIT, EST, LEVL II, 10-19 MIN: ICD-10-PCS | Mod: TH,S$PBB,, | Performed by: MIDWIFE

## 2019-09-17 PROCEDURE — 99212 OFFICE O/P EST SF 10 MIN: CPT | Mod: TH,S$PBB,, | Performed by: MIDWIFE

## 2019-09-17 PROCEDURE — 99999 PR PBB SHADOW E&M-EST. PATIENT-LVL II: CPT | Mod: PBBFAC,,, | Performed by: MIDWIFE

## 2019-09-17 PROCEDURE — 36415 COLL VENOUS BLD VENIPUNCTURE: CPT

## 2019-09-17 PROCEDURE — 90471 IMMUNIZATION ADMIN: CPT | Mod: PBBFAC

## 2019-09-17 PROCEDURE — 85025 COMPLETE CBC W/AUTO DIFF WBC: CPT

## 2019-09-17 PROCEDURE — 82950 GLUCOSE TEST: CPT

## 2019-09-17 PROCEDURE — 86703 HIV-1/HIV-2 1 RESULT ANTBDY: CPT

## 2019-09-17 PROCEDURE — 99212 OFFICE O/P EST SF 10 MIN: CPT | Mod: PBBFAC,TH,25 | Performed by: MIDWIFE

## 2019-09-17 PROCEDURE — 86592 SYPHILIS TEST NON-TREP QUAL: CPT

## 2019-09-17 NOTE — PROGRESS NOTES
24 y.o. female  at 34w0d   Reports + FM, denies VB, LOF or CTX  Doing well without concerns   Has missed and cancelled several appointments, was last seen @ 24 wks, reports boyfriend got arrested and deported, currently staying with ex- and will be able to make appointments using his truck  TW lbs   28wk labs today   Tdap today  US for growth next visit  GC/CH with GBS @ next visit  Reviewed warning signs, normal FKCs,  labor precautions and how/when to call.  RTC x 2 wks, call or present sooner prn.

## 2019-09-17 NOTE — NURSING
Used two patient identifiers. Verified allergies. tDAP IM injection given to left deltoid; patient tolerated well. Asked patient to remain in clinic for 15 minutes to be monitored for adverse reaction. Patient verbalized understanding.

## 2019-09-18 LAB
HIV 1+2 AB+HIV1 P24 AG SERPL QL IA: NEGATIVE
RPR SER QL: NORMAL

## 2019-10-03 ENCOUNTER — ROUTINE PRENATAL (OUTPATIENT)
Dept: OBSTETRICS AND GYNECOLOGY | Facility: CLINIC | Age: 24
End: 2019-10-03
Payer: MEDICAID

## 2019-10-03 ENCOUNTER — PROCEDURE VISIT (OUTPATIENT)
Dept: OBSTETRICS AND GYNECOLOGY | Facility: CLINIC | Age: 24
End: 2019-10-03
Payer: MEDICAID

## 2019-10-03 VITALS
SYSTOLIC BLOOD PRESSURE: 118 MMHG | BODY MASS INDEX: 43.13 KG/M2 | DIASTOLIC BLOOD PRESSURE: 64 MMHG | WEIGHT: 235.81 LBS

## 2019-10-03 DIAGNOSIS — O26.843 UTERINE SIZE DATE DISCREPANCY PREGNANCY, THIRD TRIMESTER: ICD-10-CM

## 2019-10-03 DIAGNOSIS — Z3A.36 36 WEEKS GESTATION OF PREGNANCY: ICD-10-CM

## 2019-10-03 DIAGNOSIS — O99.210 OBESITY AFFECTING PREGNANCY, ANTEPARTUM: ICD-10-CM

## 2019-10-03 DIAGNOSIS — Z34.83 ENCOUNTER FOR SUPERVISION OF NORMAL PREGNANCY IN MULTIGRAVIDA IN THIRD TRIMESTER: Primary | ICD-10-CM

## 2019-10-03 DIAGNOSIS — Z87.440 HISTORY OF GBS (GROUP B STREPTOCOCCUS) UTI, CURRENTLY PREGNANT, FIRST TRIMESTER: ICD-10-CM

## 2019-10-03 DIAGNOSIS — O09.891 HISTORY OF GBS (GROUP B STREPTOCOCCUS) UTI, CURRENTLY PREGNANT, FIRST TRIMESTER: ICD-10-CM

## 2019-10-03 PROCEDURE — 99212 OFFICE O/P EST SF 10 MIN: CPT | Mod: PBBFAC,TH,25 | Performed by: MIDWIFE

## 2019-10-03 PROCEDURE — 99999 PR PBB SHADOW E&M-EST. PATIENT-LVL II: CPT | Mod: PBBFAC,,, | Performed by: MIDWIFE

## 2019-10-03 PROCEDURE — 99212 PR OFFICE/OUTPT VISIT, EST, LEVL II, 10-19 MIN: ICD-10-PCS | Mod: TH,S$PBB,, | Performed by: MIDWIFE

## 2019-10-03 PROCEDURE — 99212 OFFICE O/P EST SF 10 MIN: CPT | Mod: TH,S$PBB,, | Performed by: MIDWIFE

## 2019-10-03 PROCEDURE — 76816 PR  US,PREGNANT UTERUS,F/U,TRANSABD APP: ICD-10-PCS | Mod: 26,59,S$PBB, | Performed by: OBSTETRICS & GYNECOLOGY

## 2019-10-03 PROCEDURE — 76819 PR US, OB, FETAL BIOPHYSICAL, W/O NST: ICD-10-PCS | Mod: 26,59,S$PBB, | Performed by: OBSTETRICS & GYNECOLOGY

## 2019-10-03 PROCEDURE — 76819 FETAL BIOPHYS PROFIL W/O NST: CPT | Mod: 26,59,S$PBB, | Performed by: OBSTETRICS & GYNECOLOGY

## 2019-10-03 PROCEDURE — 76816 OB US FOLLOW-UP PER FETUS: CPT | Mod: 59,PBBFAC | Performed by: OBSTETRICS & GYNECOLOGY

## 2019-10-03 PROCEDURE — 76819 FETAL BIOPHYS PROFIL W/O NST: CPT | Mod: 59,PBBFAC | Performed by: OBSTETRICS & GYNECOLOGY

## 2019-10-03 PROCEDURE — 99999 PR PBB SHADOW E&M-EST. PATIENT-LVL II: ICD-10-PCS | Mod: PBBFAC,,, | Performed by: MIDWIFE

## 2019-10-03 PROCEDURE — 76816 OB US FOLLOW-UP PER FETUS: CPT | Mod: 26,59,S$PBB, | Performed by: OBSTETRICS & GYNECOLOGY

## 2019-10-03 NOTE — PROGRESS NOTES
24 y.o. female  at 36w2d   Reports + FM, denies VB, LOF or regular CTX  Doing well without concerns  TW lbs   US today- EFW 6lb 3oz, KRUPA 15.9, MVP 4.7, BPP 8/8  SVE per request 1cm/thick/high  GBS in urine, will treat with abx during labor  Reviewed warning signs, normal FKCs, labor precautions and how/when to call.  RTC x 1 wks, call or present sooner prn.     The skin of the suprapubic region was evaluated and appears unremarkable. Counseled the patient to shower daily and to wash this area with an antibacterial soap such as Dial. Advised her not to shave the hair from this area from now until delivery. I also counseled the patient to place antibacterial hand soap in all bathrooms and kitchen to help facilitate proper hand hygiene practices before and after delivery.

## 2019-10-08 ENCOUNTER — ROUTINE PRENATAL (OUTPATIENT)
Dept: OBSTETRICS AND GYNECOLOGY | Facility: CLINIC | Age: 24
End: 2019-10-08
Payer: MEDICAID

## 2019-10-08 VITALS
BODY MASS INDEX: 43.15 KG/M2 | SYSTOLIC BLOOD PRESSURE: 108 MMHG | WEIGHT: 235.88 LBS | DIASTOLIC BLOOD PRESSURE: 72 MMHG

## 2019-10-08 DIAGNOSIS — O99.210 OBESITY AFFECTING PREGNANCY, ANTEPARTUM: ICD-10-CM

## 2019-10-08 DIAGNOSIS — Z3A.37 37 WEEKS GESTATION OF PREGNANCY: ICD-10-CM

## 2019-10-08 DIAGNOSIS — Z34.83 ENCOUNTER FOR SUPERVISION OF OTHER NORMAL PREGNANCY IN THIRD TRIMESTER: Primary | ICD-10-CM

## 2019-10-08 PROCEDURE — 99212 OFFICE O/P EST SF 10 MIN: CPT | Mod: PBBFAC,TH | Performed by: MIDWIFE

## 2019-10-08 PROCEDURE — 99999 PR PBB SHADOW E&M-EST. PATIENT-LVL II: ICD-10-PCS | Mod: PBBFAC,,, | Performed by: MIDWIFE

## 2019-10-08 PROCEDURE — 99212 OFFICE O/P EST SF 10 MIN: CPT | Mod: TH,S$PBB,, | Performed by: MIDWIFE

## 2019-10-08 PROCEDURE — 99212 PR OFFICE/OUTPT VISIT, EST, LEVL II, 10-19 MIN: ICD-10-PCS | Mod: TH,S$PBB,, | Performed by: MIDWIFE

## 2019-10-08 PROCEDURE — 99999 PR PBB SHADOW E&M-EST. PATIENT-LVL II: CPT | Mod: PBBFAC,,, | Performed by: MIDWIFE

## 2019-10-08 NOTE — PROGRESS NOTES
24 y.o. female  at 37w0d   Reports + FM, denies VB, LOF or regular CTX  Doing well without concerns   TW lbs   SVE per request- 2/thick/high  Reviewed warning signs, normal FKCs, labor precautions and how/when to call.  RTC x 1 wks, call or present sooner prn.

## 2019-10-14 ENCOUNTER — ROUTINE PRENATAL (OUTPATIENT)
Dept: OBSTETRICS AND GYNECOLOGY | Facility: CLINIC | Age: 24
End: 2019-10-14
Payer: MEDICAID

## 2019-10-14 VITALS — WEIGHT: 240.5 LBS | SYSTOLIC BLOOD PRESSURE: 118 MMHG | DIASTOLIC BLOOD PRESSURE: 72 MMHG | BODY MASS INDEX: 43.99 KG/M2

## 2019-10-14 DIAGNOSIS — Z34.83 ENCOUNTER FOR SUPERVISION OF NORMAL PREGNANCY IN MULTIGRAVIDA IN THIRD TRIMESTER: Primary | ICD-10-CM

## 2019-10-14 DIAGNOSIS — Z3A.37 37 WEEKS GESTATION OF PREGNANCY: ICD-10-CM

## 2019-10-14 DIAGNOSIS — O99.210 OBESITY AFFECTING PREGNANCY, ANTEPARTUM: ICD-10-CM

## 2019-10-14 PROCEDURE — 99212 PR OFFICE/OUTPT VISIT, EST, LEVL II, 10-19 MIN: ICD-10-PCS | Mod: TH,S$PBB,, | Performed by: MIDWIFE

## 2019-10-14 PROCEDURE — 99212 OFFICE O/P EST SF 10 MIN: CPT | Mod: TH,S$PBB,, | Performed by: MIDWIFE

## 2019-10-14 PROCEDURE — 99999 PR PBB SHADOW E&M-EST. PATIENT-LVL II: ICD-10-PCS | Mod: PBBFAC,,, | Performed by: MIDWIFE

## 2019-10-14 PROCEDURE — 99212 OFFICE O/P EST SF 10 MIN: CPT | Mod: PBBFAC,TH | Performed by: MIDWIFE

## 2019-10-14 PROCEDURE — 99999 PR PBB SHADOW E&M-EST. PATIENT-LVL II: CPT | Mod: PBBFAC,,, | Performed by: MIDWIFE

## 2019-10-14 NOTE — PROGRESS NOTES
24 y.o. female  at 37w6d   Reports + FM, denies VB, LOF or regular CTX  Doing well without concerns   TW lbs   SVE per request 2-3/60/-3  Reviewed GBS in urine and need for intrapartum abx  Reviewed warning signs, normal FKCs, labor precautions and how/when to call.  RTC x 1 wks, call or present sooner prn.

## 2019-10-20 ENCOUNTER — HOSPITAL ENCOUNTER (INPATIENT)
Facility: HOSPITAL | Age: 24
LOS: 3 days | Discharge: HOME | End: 2019-10-23
Attending: OBSTETRICS & GYNECOLOGY | Admitting: OBSTETRICS & GYNECOLOGY
Payer: MEDICAID

## 2019-10-20 DIAGNOSIS — O47.9 THREATENED LABOR AT TERM: ICD-10-CM

## 2019-10-20 DIAGNOSIS — Z37.9 NORMAL LABOR: ICD-10-CM

## 2019-10-20 PROCEDURE — 11000001 HC ACUTE MED/SURG PRIVATE ROOM

## 2019-10-20 PROCEDURE — 59025 FETAL NON-STRESS TEST: CPT

## 2019-10-20 PROCEDURE — 99211 OFF/OP EST MAY X REQ PHY/QHP: CPT | Mod: 25

## 2019-10-21 PROBLEM — O47.9 THREATENED LABOR AT TERM: Status: RESOLVED | Noted: 2019-10-20 | Resolved: 2019-10-21

## 2019-10-21 LAB
ABO + RH BLD: NORMAL
BASOPHILS # BLD AUTO: 0.01 K/UL (ref 0–0.2)
BASOPHILS NFR BLD: 0.1 % (ref 0–1.9)
BLD GP AB SCN CELLS X3 SERPL QL: NORMAL
DIFFERENTIAL METHOD: ABNORMAL
EOSINOPHIL # BLD AUTO: 0.2 K/UL (ref 0–0.5)
EOSINOPHIL NFR BLD: 1.5 % (ref 0–8)
ERYTHROCYTE [DISTWIDTH] IN BLOOD BY AUTOMATED COUNT: 14.1 % (ref 11.5–14.5)
HCT VFR BLD AUTO: 35.5 % (ref 37–48.5)
HGB BLD-MCNC: 11.1 G/DL (ref 12–16)
IMM GRANULOCYTES # BLD AUTO: 0.14 K/UL (ref 0–0.04)
IMM GRANULOCYTES NFR BLD AUTO: 1 % (ref 0–0.5)
LYMPHOCYTES # BLD AUTO: 3.1 K/UL (ref 1–4.8)
LYMPHOCYTES NFR BLD: 21 % (ref 18–48)
MCH RBC QN AUTO: 27.9 PG (ref 27–31)
MCHC RBC AUTO-ENTMCNC: 31.3 G/DL (ref 32–36)
MCV RBC AUTO: 89 FL (ref 82–98)
MONOCYTES # BLD AUTO: 0.8 K/UL (ref 0.3–1)
MONOCYTES NFR BLD: 5.5 % (ref 4–15)
NEUTROPHILS # BLD AUTO: 10.5 K/UL (ref 1.8–7.7)
NEUTROPHILS NFR BLD: 70.9 % (ref 38–73)
NRBC BLD-RTO: 0 /100 WBC
PLATELET # BLD AUTO: 336 K/UL (ref 150–350)
PMV BLD AUTO: 10.3 FL (ref 9.2–12.9)
RBC # BLD AUTO: 3.98 M/UL (ref 4–5.4)
WBC # BLD AUTO: 14.73 K/UL (ref 3.9–12.7)

## 2019-10-21 PROCEDURE — 86901 BLOOD TYPING SEROLOGIC RH(D): CPT

## 2019-10-21 PROCEDURE — 25000003 PHARM REV CODE 250: Performed by: ADVANCED PRACTICE MIDWIFE

## 2019-10-21 PROCEDURE — 85025 COMPLETE CBC W/AUTO DIFF WBC: CPT

## 2019-10-21 PROCEDURE — 11000001 HC ACUTE MED/SURG PRIVATE ROOM

## 2019-10-21 PROCEDURE — 63600175 PHARM REV CODE 636 W HCPCS: Performed by: ADVANCED PRACTICE MIDWIFE

## 2019-10-21 PROCEDURE — 25000003 PHARM REV CODE 250: Performed by: OBSTETRICS & GYNECOLOGY

## 2019-10-21 PROCEDURE — 59409 OBSTETRICAL CARE: CPT | Mod: AT,,, | Performed by: ADVANCED PRACTICE MIDWIFE

## 2019-10-21 PROCEDURE — 72100002 HC LABOR CARE, 1ST 8 HOURS

## 2019-10-21 PROCEDURE — 72200005 HC VAGINAL DELIVERY LEVEL II

## 2019-10-21 PROCEDURE — 59409 PR OBSTETRICAL CARE,VAG DELIV ONLY: ICD-10-PCS | Mod: AT,,, | Performed by: ADVANCED PRACTICE MIDWIFE

## 2019-10-21 PROCEDURE — 72100003 HC LABOR CARE, EA. ADDL. 8 HRS

## 2019-10-21 RX ORDER — ONDANSETRON 8 MG/1
8 TABLET, ORALLY DISINTEGRATING ORAL EVERY 8 HOURS PRN
Status: DISCONTINUED | OUTPATIENT
Start: 2019-10-21 | End: 2019-10-23 | Stop reason: HOSPADM

## 2019-10-21 RX ORDER — MISOPROSTOL 200 UG/1
600 TABLET ORAL EVERY 6 HOURS
Status: DISCONTINUED | OUTPATIENT
Start: 2019-10-21 | End: 2019-10-23 | Stop reason: HOSPADM

## 2019-10-21 RX ORDER — HYDROCODONE BITARTRATE AND ACETAMINOPHEN 5; 325 MG/1; MG/1
1 TABLET ORAL EVERY 4 HOURS PRN
Status: DISCONTINUED | OUTPATIENT
Start: 2019-10-21 | End: 2019-10-23 | Stop reason: HOSPADM

## 2019-10-21 RX ORDER — SODIUM CHLORIDE 9 MG/ML
INJECTION, SOLUTION INTRAVENOUS
Status: DISCONTINUED | OUTPATIENT
Start: 2019-10-21 | End: 2019-10-21

## 2019-10-21 RX ORDER — ACETAMINOPHEN 325 MG/1
650 TABLET ORAL EVERY 6 HOURS PRN
Status: DISCONTINUED | OUTPATIENT
Start: 2019-10-21 | End: 2019-10-23 | Stop reason: HOSPADM

## 2019-10-21 RX ORDER — HYDROCORTISONE 25 MG/G
CREAM TOPICAL 3 TIMES DAILY PRN
Status: DISCONTINUED | OUTPATIENT
Start: 2019-10-21 | End: 2019-10-23 | Stop reason: HOSPADM

## 2019-10-21 RX ORDER — OXYTOCIN/RINGER'S LACTATE 30/500 ML
2 PLASTIC BAG, INJECTION (ML) INTRAVENOUS CONTINUOUS
Status: DISCONTINUED | OUTPATIENT
Start: 2019-10-21 | End: 2019-10-21

## 2019-10-21 RX ORDER — DIPHENHYDRAMINE HYDROCHLORIDE 50 MG/ML
25 INJECTION INTRAMUSCULAR; INTRAVENOUS EVERY 4 HOURS PRN
Status: DISCONTINUED | OUTPATIENT
Start: 2019-10-21 | End: 2019-10-23 | Stop reason: HOSPADM

## 2019-10-21 RX ORDER — OXYTOCIN/RINGER'S LACTATE 30/500 ML
95 PLASTIC BAG, INJECTION (ML) INTRAVENOUS ONCE
Status: DISCONTINUED | OUTPATIENT
Start: 2019-10-21 | End: 2019-10-21

## 2019-10-21 RX ORDER — SODIUM CHLORIDE, SODIUM LACTATE, POTASSIUM CHLORIDE, CALCIUM CHLORIDE 600; 310; 30; 20 MG/100ML; MG/100ML; MG/100ML; MG/100ML
INJECTION, SOLUTION INTRAVENOUS CONTINUOUS
Status: DISCONTINUED | OUTPATIENT
Start: 2019-10-21 | End: 2019-10-21

## 2019-10-21 RX ORDER — DOCUSATE SODIUM 100 MG/1
200 CAPSULE, LIQUID FILLED ORAL 2 TIMES DAILY PRN
Status: DISCONTINUED | OUTPATIENT
Start: 2019-10-21 | End: 2019-10-23 | Stop reason: HOSPADM

## 2019-10-21 RX ORDER — IBUPROFEN 600 MG/1
600 TABLET ORAL EVERY 6 HOURS
Status: DISCONTINUED | OUTPATIENT
Start: 2019-10-21 | End: 2019-10-23 | Stop reason: HOSPADM

## 2019-10-21 RX ORDER — OXYTOCIN/RINGER'S LACTATE 30/500 ML
95 PLASTIC BAG, INJECTION (ML) INTRAVENOUS ONCE
Status: DISCONTINUED | OUTPATIENT
Start: 2019-10-21 | End: 2019-10-23 | Stop reason: HOSPADM

## 2019-10-21 RX ORDER — SIMETHICONE 80 MG
1 TABLET,CHEWABLE ORAL 4 TIMES DAILY PRN
Status: DISCONTINUED | OUTPATIENT
Start: 2019-10-21 | End: 2019-10-21

## 2019-10-21 RX ORDER — DIPHENHYDRAMINE HCL 25 MG
25 CAPSULE ORAL EVERY 4 HOURS PRN
Status: DISCONTINUED | OUTPATIENT
Start: 2019-10-21 | End: 2019-10-23 | Stop reason: HOSPADM

## 2019-10-21 RX ORDER — OXYTOCIN/RINGER'S LACTATE 30/500 ML
334 PLASTIC BAG, INJECTION (ML) INTRAVENOUS ONCE
Status: DISCONTINUED | OUTPATIENT
Start: 2019-10-21 | End: 2019-10-21

## 2019-10-21 RX ORDER — CALCIUM CARBONATE 200(500)MG
500 TABLET,CHEWABLE ORAL 3 TIMES DAILY PRN
Status: DISCONTINUED | OUTPATIENT
Start: 2019-10-21 | End: 2019-10-21

## 2019-10-21 RX ORDER — ONDANSETRON 8 MG/1
8 TABLET, ORALLY DISINTEGRATING ORAL EVERY 8 HOURS PRN
Status: DISCONTINUED | OUTPATIENT
Start: 2019-10-21 | End: 2019-10-21

## 2019-10-21 RX ADMIN — DEXTROSE 5 MILLION UNITS: 50 INJECTION, SOLUTION INTRAVENOUS at 02:10

## 2019-10-21 RX ADMIN — Medication 167 MILLI-UNITS/MIN: at 03:10

## 2019-10-21 RX ADMIN — MISOPROSTOL 600 MCG: 200 TABLET ORAL at 11:10

## 2019-10-21 RX ADMIN — Medication 2 MILLI-UNITS/MIN: at 12:10

## 2019-10-21 RX ADMIN — DEXTROSE 3 MILLION UNITS: 50 INJECTION, SOLUTION INTRAVENOUS at 06:10

## 2019-10-21 RX ADMIN — IBUPROFEN 600 MG: 600 TABLET, FILM COATED ORAL at 05:10

## 2019-10-21 RX ADMIN — SODIUM CHLORIDE, SODIUM LACTATE, POTASSIUM CHLORIDE, AND CALCIUM CHLORIDE: .6; .31; .03; .02 INJECTION, SOLUTION INTRAVENOUS at 02:10

## 2019-10-21 RX ADMIN — IBUPROFEN 600 MG: 600 TABLET, FILM COATED ORAL at 11:10

## 2019-10-21 RX ADMIN — DEXTROSE 3 MILLION UNITS: 50 INJECTION, SOLUTION INTRAVENOUS at 11:10

## 2019-10-21 RX ADMIN — MISOPROSTOL 600 MCG: 200 TABLET ORAL at 05:10

## 2019-10-21 NOTE — PROGRESS NOTES
2145: patient to triage with complaints of contractions every 2-3 minutes throughout the night. States pain is 3/10 with contractions. Patient complains of n/v and loose stools at home. States she has been losing her mucus plug today but denies bloody show. Reports good fetal movement. Denies leaking of fluid, painful urination, or shortness of breath. Monitors applied. Claribel ALLISON made aware of patient's arrival and complaints.orders received. SVE by this RN 4/50/-3 posterior cervix with some bloody show on gloves. Patient requesting to walk.    2345: SVE by this RN outer cervix funneling and thin but inner os remains 4/50/-3 with anterior positioning now. Head well applied.  Patient requesting to walk again.

## 2019-10-21 NOTE — PROGRESS NOTES
[facility] -   Obstetrics  Labor Progress Note    Patient Name: Padmini Wren  MRN: [id number]  Admission Date: 10/20/2019  Hospital Length of Stay: 0 days  Attending Physician: Ezequiel Bailey MD  Primary Care Provider: Taryn Salcido MD    Subjective:     Principal Problem:Normal labor    Hospital Course:  Admit for delivery  Plans NCB, anticipate   8:45- just finished with the breast pump, plans to walk around the unit. Given options for AROM vs expectant management. Patient would like to wait.     Interval History:  Padmini is a 24 y.o.  at 38w6d. She is doing well.     Objective:     Vital Signs (Most Recent):  Temp: 98.1 °F (36.7 °C) (10/21/19 0333)  Pulse: 99 (10/21/19 0546)  Resp: 20 (10/21/19 0333)  BP: 113/61 (10/21/19 0546)  SpO2: 97 % (10/21/19 0333) Vital Signs (24h Range):  Temp:  [98.1 °F (36.7 °C)] 98.1 °F (36.7 °C)  Pulse:  [] 99  Resp:  [20] 20  SpO2:  [96 %-98 %] 97 %  BP: (104-132)/(61-80) 113/61     Weight: 108.9 kg (240 lb)  Body mass index is 43.9 kg/m².    FHT: 135 Cat 1 (reassuring)  TOCO:  Q 2-6 minutes    Cervical Exam:  Dilation:  6       Significant Labs:  Lab Results   Component Value Date    GROUPTRH A POS 10/21/2019    HEPBSAG Negative 2019    STREPBCULT  2018     STREPTOCOCCUS AGALACTIAE (GROUP B)  Beta-hemolytic streptococci are routinely susceptible to   penicillins,cephalosporins and carbapenems.         I have personallly reviewed all pertinent lab results from the last 24 hours.    Physical Exam:   Constitutional: She is oriented to person, place, and time. She appears well-developed and well-nourished.    HENT:   Head: Normocephalic.     Neck: Normal range of motion.    Cardiovascular: Normal rate and regular rhythm.     Pulmonary/Chest: Effort normal.        Abdominal: Soft.     Genitourinary: Vagina normal and uterus normal.           Musculoskeletal: Normal range of motion and moves all extremeties.        Neurological: She is alert and oriented to person, place, and time. She has normal reflexes.    Skin: Skin is warm and dry.        Assessment/Plan:     24 y.o. female  at 38w6d for:    * Normal labor  Continue with expectant management          Keely Salgado CNM  Obstetrics  [facility] -

## 2019-10-21 NOTE — SUBJECTIVE & OBJECTIVE
Obstetric HPI:  Patient reports  contractions, active fetal movement, No vaginal bleeding , No loss of fluid     This pregnancy has been complicated by RA factor, obesity, + GBS, anemia    OB History    Para Term  AB Living   3 2 2 0 0 2   SAB TAB Ectopic Multiple Live Births   0 0 0 0 2      # Outcome Date GA Lbr Paulino/2nd Weight Sex Delivery Anes PTL Lv   3 Current            2 Term 18 39w6d / 00:30 3.48 kg (7 lb 10.8 oz) M Vag-Spont Local N JOJO      Name: ERASMO YEAGER      Apgar1: 8  Apgar5: 9   1 Term 16 39w0d  3.6 kg (7 lb 15 oz) M Vag-Spont None  JOJO      Birth Comments: NCB at [facility]     Past Medical History:   Diagnosis Date    Anxiety     Arthritis     Asthma     has not used inhalers in years    JRA (juvenile rheumatoid arthritis)     Seizure     not on medications. has not had one since childhood    Seizures      Past Surgical History:   Procedure Laterality Date    ANKLE FRACTURE SURGERY      CYST REMOVAL      tubes ears         No medications prior to admission.       Review of patient's allergies indicates:  No Known Allergies     Family History     Problem Relation (Age of Onset)    Asthma Mother, Brother, Brother    Inflammatory bowel disease Mother        Tobacco Use    Smoking status: Never Smoker    Smokeless tobacco: Never Used   Substance and Sexual Activity    Alcohol use: Not Currently    Drug use: No    Sexual activity: Yes     Partners: Male     Birth control/protection: None     Review of Systems   Gastrointestinal: Positive for abdominal pain.   Musculoskeletal: Positive for back pain.   All other systems reviewed and are negative.     Objective:     Vital Signs (Most Recent):  Temp: 98.1 °F (36.7 °C) (10/20/19 2141)  Pulse: 96 (10/21/19 0318)  Resp: 20 (10/20/19 2141)  BP: 118/68 (10/21/19 0318)  SpO2: 98 % (10/21/19 0318) Vital Signs (24h Range):  Temp:  [98.1 °F (36.7 °C)] 98.1 °F (36.7 °C)  Pulse:  [] 96  Resp:  [20]  20  SpO2:  [96 %-98 %] 98 %  BP: (104-132)/(66-80) 118/68     Weight: 108.9 kg (240 lb)  Body mass index is 43.9 kg/m².    FHT: Cat 1 (reassuring), moderate variability  TOCO:  2-4    Physical Exam:   Constitutional: She is oriented to person, place, and time. She appears well-developed and well-nourished.        Pulmonary/Chest: Effort normal.        Abdominal: Soft.     Genitourinary: Vagina normal and uterus normal.           Musculoskeletal: Normal range of motion and moves all extremeties.       Neurological: She is alert and oriented to person, place, and time.    Skin: Skin is warm and dry.    Psychiatric: She has a normal mood and affect. Her behavior is normal.       Cervix:  Dilation:  6  Effacement:  60  Station: -2  Presentation: Vertex     Significant Labs:  Lab Results   Component Value Date    GROUPTRH A POS 05/06/2019    HEPBSAG Negative 05/06/2019    STREPBCULT  02/13/2018     STREPTOCOCCUS AGALACTIAE (GROUP B)  Beta-hemolytic streptococci are routinely susceptible to   penicillins,cephalosporins and carbapenems.         I have personallly reviewed all pertinent lab results from the last 24 hours.

## 2019-10-21 NOTE — NURSING
[employer] Symphony breast pump set up at bedside.  Instructed on proper usage and to adjust suction according to comfort level. Verified appropriate flange fit- 27 mm. Reviewed frequency and duration of pumping in order to promote and maintain full milk supply. Hands-on pumping technique reviewed. Voices understanding.

## 2019-10-21 NOTE — NURSING
CNM updated with pt desire to set up pump to stimulate contractions; pt mother verbalized being frustrated and expediting delivery. Pt verbalized being frustrated that the contractions are not back to back.

## 2019-10-21 NOTE — PROGRESS NOTES
Patient re-checked @ two hour shyla with ne significant cervical change.  Desires to walk more, stated earlier she knows this is labor.  Desires tub birth.  Continue with observation @ this time.

## 2019-10-21 NOTE — SUBJECTIVE & OBJECTIVE
Interval History:  Padmini is a 24 y.o.  at 38w6d. She is doing well.     Objective:     Vital Signs (Most Recent):  Temp: 98.1 °F (36.7 °C) (10/21/19 0333)  Pulse: 99 (10/21/19 0546)  Resp: 20 (10/21/19 0333)  BP: 113/61 (10/21/19 0546)  SpO2: 97 % (10/21/19 0333) Vital Signs (24h Range):  Temp:  [98.1 °F (36.7 °C)] 98.1 °F (36.7 °C)  Pulse:  [] 99  Resp:  [20] 20  SpO2:  [96 %-98 %] 97 %  BP: (104-132)/(61-80) 113/61     Weight: 108.9 kg (240 lb)  Body mass index is 43.9 kg/m².    FHT: 135 Cat 1 (reassuring)  TOCO:  Q 2-6 minutes    Cervical Exam:  Dilation:  6       Significant Labs:  Lab Results   Component Value Date    GROUPTRH A POS 10/21/2019    HEPBSAG Negative 2019    STREPBCULT  2018     STREPTOCOCCUS AGALACTIAE (GROUP B)  Beta-hemolytic streptococci are routinely susceptible to   penicillins,cephalosporins and carbapenems.         I have personallly reviewed all pertinent lab results from the last 24 hours.    Physical Exam:   Constitutional: She is oriented to person, place, and time. She appears well-developed and well-nourished.    HENT:   Head: Normocephalic.     Neck: Normal range of motion.    Cardiovascular: Normal rate and regular rhythm.     Pulmonary/Chest: Effort normal.        Abdominal: Soft.     Genitourinary: Vagina normal and uterus normal.           Musculoskeletal: Normal range of motion and moves all extremeties.       Neurological: She is alert and oriented to person, place, and time. She has normal reflexes.    Skin: Skin is warm and dry.

## 2019-10-21 NOTE — PROGRESS NOTES
S: resting with family in room   O:  VS reviewed, afebrile   Vitals:    10/21/19 1005 10/21/19 1218 10/21/19 1233 10/21/19 1248   BP:  115/73 117/71 120/77   Pulse:  83 91 95   Resp: 17   18   Temp:    97.9 °F (36.6 °C)   TempSrc:    Oral   SpO2:       Weight:       Height:           FHTs cat 1, reassuring  UC 6-10  SVE 6/90/-3 posterior    A: IUP @ 38w6d ;     Patient Active Problem List   Diagnosis    Seizures    BMI 40.0-44.9, adult    Rheumatoid arthritis involving right knee with negative rheumatoid factor    Anxiety    History of gallstones    Gallstones    Obesity affecting pregnancy, antepartum    Anemia during pregnancy in first trimester    Normal labor    History of GBS (group b Streptococcus) UTI, currently pregnant, first trimester    Chlamydia infection affecting pregnancy       P:   Continue close monitoring of maternal/fetal status  Discussed possible pitocin use to increase uterine contractions. Reviewed risks/benefits. Patient would like to proceed with pitocin.

## 2019-10-21 NOTE — NURSING
Electric pumping session in process; pt frustrated baby has not come out yet. Pt educated on flanges, pt verbalized being happy there is a manual breast pump; Pt educated that she will pump for 15 min, walk or 5 min, and then rest. Pt verbalized understanding.

## 2019-10-21 NOTE — L&D DELIVERY NOTE
[facility]  Vaginal Delivery   Operative Note    SUMMARY     Normal spontaneous vaginal delivery of live female infant, was placed on mothers abdomen for skin to skin and bulb suctioning performed. Infant delivered in the tub.   Infant delivered position OA over intact perineum.  Nuchal cord: Yes, cord reduced following delivery.    Spontaneous delivery of placenta and IV pitocin given noting good uterine tone.  No lacerations noted.  Patient tolerated delivery well. Sponge needle and lap counted correctly x2.    Indications: Normal labor  Pregnancy complicated by:   Patient Active Problem List   Diagnosis    Seizures    BMI 40.0-44.9, adult    Rheumatoid arthritis involving right knee with negative rheumatoid factor    Anxiety    History of gallstones    Gallstones    Obesity affecting pregnancy, antepartum    Anemia during pregnancy in first trimester    Normal labor    History of GBS (group b Streptococcus) UTI, currently pregnant, first trimester     (normal spontaneous vaginal delivery)    Chlamydia infection affecting pregnancy    Single liveborn, born in hospital     Admitting GA: 38w6d    Delivery Information for Girl Padmini Wren    Birth information:  YOB: 2018   Time of birth: 3:33 PM   Sex: female   Head Delivery Date/Time:     Delivery type:    Gestational Age: 38w6d    Delivery Providers    Delivering clinician:             Measurements    Weight:    Length:           Apgars    Living status:    Apgars:   1 min.:   5 min.:   10 min.:   15 min.:   20 min.:     Skin color:          Heart rate:          Reflex irritability:          Muscle tone:          Respiratory effort:          Total:                                 Interventions/Resuscitation         Cord    No data filed        Placenta    Placenta delivery date/time:    Placenta removal:             Labor Events:       labor:       Labor Onset Date/Time:         Dilation Complete  Date/Time:         Start Pushing Date/Time:       Rupture Date/Time: 10/21/19  1019         Rupture type:           Fluid Amount:        Fluid Color:        Fluid Odor:        Membrane Status (PeriCalm): ARM (Artificial Rupture)      Rupture Date/Time (PeriCalm): 10/21/2019 10:19:00      Fluid Amount (PeriCalm): None      Fluid Color (PeriCalm):         steroids:       Antibiotics given for GBS:       Induction:       Indications for induction:        Augmentation:       Indications for augmentation:       Labor complications:       Additional complications:          Cervical ripening:                     Delivery:      Episiotomy:       Indication for Episiotomy:       Perineal Lacerations:   Repaired:      Periurethral Laceration:   Repaired:     Labial Laceration:   Repaired:     Sulcus Laceration:   Repaired:     Vaginal Laceration:   Repaired:     Cervical Laceration:   Repaired:     Repair suture:       Repair # of packets:       Last Value - EBL - Nursing (mL):       Sum - EBL - Nursing (mL): 0     Last Value - EBL - Anesthesia (mL):      Calculated QBL (mL):        Vaginal Sweep Performed:       Surgicount Correct:         Other providers:            Details (if applicable):  Trial of Labor      Categorization:      Priority:     Indications for :     Incision Type:       Additional  information:  Forceps:    Vacuum:    Breech:    Observed anomalies    Other (Comments):

## 2019-10-21 NOTE — H&P
[facility] -   Obstetrics  History & Physical    Patient Name: Padmini Wren  MRN: [id number]  Admission Date: 10/20/2019  Primary Care Provider: Taryn Salcido MD    Subjective:     Principal Problem:Normal labor    History of Present Illness:  Observation x's 3+ hours with cervical change to 6 cm    Obstetric HPI:  Patient reports  contractions, active fetal movement, No vaginal bleeding , No loss of fluid     This pregnancy has been complicated by RA factor, obesity, + GBS, anemia    OB History    Para Term  AB Living   3 2 2 0 0 2   SAB TAB Ectopic Multiple Live Births   0 0 0 0 2      # Outcome Date GA Lbr Paulino/2nd Weight Sex Delivery Anes PTL Lv   3 Current            2 Term 18 39w6d / 00:30 3.48 kg (7 lb 10.8 oz) M Vag-Spont Local N JOJO      Name: ERASMO YEAGER      Apgar1: 8  Apgar5: 9   1 Term 16 39w0d  3.6 kg (7 lb 15 oz) M Vag-Spont None  JOJO      Birth Comments: NCB at [facility]     Past Medical History:   Diagnosis Date    Anxiety     Arthritis     Asthma     has not used inhalers in years    JRA (juvenile rheumatoid arthritis)     Seizure     not on medications. has not had one since childhood    Seizures      Past Surgical History:   Procedure Laterality Date    ANKLE FRACTURE SURGERY      CYST REMOVAL      tubes ears         No medications prior to admission.       Review of patient's allergies indicates:  No Known Allergies     Family History     Problem Relation (Age of Onset)    Asthma Mother, Brother, Brother    Inflammatory bowel disease Mother        Tobacco Use    Smoking status: Never Smoker    Smokeless tobacco: Never Used   Substance and Sexual Activity    Alcohol use: Not Currently    Drug use: No    Sexual activity: Yes     Partners: Male     Birth control/protection: None     Review of Systems   Gastrointestinal: Positive for abdominal pain.   Musculoskeletal: Positive for back pain.   All other systems  reviewed and are negative.     Objective:     Vital Signs (Most Recent):  Temp: 98.1 °F (36.7 °C) (10/20/19 2141)  Pulse: 96 (10/21/19 0318)  Resp: 20 (10/20/19 2141)  BP: 118/68 (10/21/19 0318)  SpO2: 98 % (10/21/19 0318) Vital Signs (24h Range):  Temp:  [98.1 °F (36.7 °C)] 98.1 °F (36.7 °C)  Pulse:  [] 96  Resp:  [20] 20  SpO2:  [96 %-98 %] 98 %  BP: (104-132)/(66-80) 118/68     Weight: 108.9 kg (240 lb)  Body mass index is 43.9 kg/m².    FHT: Cat 1 (reassuring), moderate variability  TOCO:  2-4    Physical Exam:   Constitutional: She is oriented to person, place, and time. She appears well-developed and well-nourished.        Pulmonary/Chest: Effort normal.        Abdominal: Soft.     Genitourinary: Vagina normal and uterus normal.           Musculoskeletal: Normal range of motion and moves all extremeties.       Neurological: She is alert and oriented to person, place, and time.    Skin: Skin is warm and dry.    Psychiatric: She has a normal mood and affect. Her behavior is normal.       Cervix:  Dilation:  6  Effacement:  60  Station: -2  Presentation: Vertex     Significant Labs:  Lab Results   Component Value Date    GROUPTRH A POS 2019    HEPBSAG Negative 2019    STREPBCULT  2018     STREPTOCOCCUS AGALACTIAE (GROUP B)  Beta-hemolytic streptococci are routinely susceptible to   penicillins,cephalosporins and carbapenems.         I have personallly reviewed all pertinent lab results from the last 24 hours.    Assessment/Plan:     24 y.o. female  at 38w6d for:    No notes have been filed under this hospital service.  Service: Obstetrics and Gynecology      Claribel Hernandez, ESTEFANY  Obstetrics  [facility]

## 2019-10-21 NOTE — HOSPITAL COURSE
Admit for delivery  Plans NCB, anticipate   8:45- just finished with the breast pump, plans to walk around the unit. Given options for AROM vs expectant management. Patient would like to wait.    10/21/19 1533  10/22/19 PPD1 routine pp care   10/23/19 PPD #2 desires d/c formula feeding, nexplanon inserted in hospital, routine PP home care. al

## 2019-10-22 PROBLEM — Z37.9 NORMAL LABOR: Status: RESOLVED | Noted: 2018-03-19 | Resolved: 2019-10-22

## 2019-10-22 PROCEDURE — 25000003 PHARM REV CODE 250: Performed by: ADVANCED PRACTICE MIDWIFE

## 2019-10-22 PROCEDURE — 99231 PR SUBSEQUENT HOSPITAL CARE,LEVL I: ICD-10-PCS | Mod: ,,, | Performed by: ADVANCED PRACTICE MIDWIFE

## 2019-10-22 PROCEDURE — 99231 SBSQ HOSP IP/OBS SF/LOW 25: CPT | Mod: ,,, | Performed by: ADVANCED PRACTICE MIDWIFE

## 2019-10-22 PROCEDURE — 11000001 HC ACUTE MED/SURG PRIVATE ROOM

## 2019-10-22 PROCEDURE — 25000003 PHARM REV CODE 250: Performed by: OBSTETRICS & GYNECOLOGY

## 2019-10-22 RX ADMIN — IBUPROFEN 600 MG: 600 TABLET, FILM COATED ORAL at 05:10

## 2019-10-22 RX ADMIN — IBUPROFEN 600 MG: 600 TABLET, FILM COATED ORAL at 11:10

## 2019-10-22 RX ADMIN — MISOPROSTOL 600 MCG: 200 TABLET ORAL at 05:10

## 2019-10-22 NOTE — PLAN OF CARE
Plan of care reviewed. Fundus firm. Bonding with infant. Frequent checks made to ensure safety and comfort. Bed low, call bell within reach. Will continue to monitor.

## 2019-10-22 NOTE — SUBJECTIVE & OBJECTIVE
Hospital course: Admit for delivery  Plans NCB, anticipate   8:45- just finished with the breast pump, plans to walk around the unit. Given options for AROM vs expectant management. Patient would like to wait.    10/21/19 1533  10/22/19 PPD1 routine pp care     Interval History:     She is doing well this morning. She is tolerating a regular diet without nausea or vomiting. She is voiding spontaneously. She is ambulating. Vaginal bleeding is mild. She denies fever or chills. Abdominal pain is mild and controlled with oral medications. She is not breastfeeding.     Objective:     Vital Signs (Most Recent):  Temp: 98.5 °F (36.9 °C) (10/22/19 0800)  Pulse: 80 (10/22/19 0800)  Resp: 18 (10/22/19 0800)  BP: (!) 114/56 (10/22/19 0800)  SpO2: 97 % (10/21/19 0333) Vital Signs (24h Range):  Temp:  [97.1 °F (36.2 °C)-98.8 °F (37.1 °C)] 98.5 °F (36.9 °C)  Pulse:  [] 80  Resp:  [16-20] 18  BP: (103-134)/(56-82) 114/56     Weight: 108.9 kg (240 lb)  Body mass index is 43.9 kg/m².      Intake/Output Summary (Last 24 hours) at 10/22/2019 1016  Last data filed at 10/22/2019 0600  Gross per 24 hour   Intake --   Output 1000 ml   Net -1000 ml       Significant Labs:  Lab Results   Component Value Date    GROUPTRH A POS 10/21/2019    HEPBSAG Negative 2019    STREPBCULT  2018     STREPTOCOCCUS AGALACTIAE (GROUP B)  Beta-hemolytic streptococci are routinely susceptible to   penicillins,cephalosporins and carbapenems.       Recent Labs   Lab 10/21/19  0230   HGB 11.1*   HCT 35.5*       I have personallly reviewed all pertinent lab results from the last 24 hours.    Physical Exam:   Constitutional: She is oriented to person, place, and time. She appears well-developed and well-nourished.    HENT:   Head: Normocephalic.     Neck: Normal range of motion.    Cardiovascular: Normal rate, regular rhythm, normal heart sounds and intact distal pulses.     Pulmonary/Chest: Effort normal and breath sounds normal.         Abdominal: Soft.   Non tender      Genitourinary: Vagina normal and uterus normal.           Musculoskeletal: Normal range of motion and moves all extremeties.       Neurological: She is alert and oriented to person, place, and time.    Skin: Skin is warm and dry.    Psychiatric: She has a normal mood and affect. Her behavior is normal. Judgment and thought content normal.

## 2019-10-22 NOTE — PLAN OF CARE
SOCIAL WORK DISCHARGE PLANNING ASSESSMENT    Sw completed discharge planning assessment with pt's mother via telephone [phone].  Pt's mother was easily engaged. Education on the role of  was provided. Emotional support provided throughout assessment.    SW addressed staff concern of QUINTON being in custodial and possibly being deported. Pt reported that she and FOB are not in a relationship and that they are just friends. Pt also reported that even if QUINTON gets deported that he will be able to return to the [place] in December. Pt reports a large amount of support from her family and FOB's family.     Support person(s): Lilia España [phone]    Pediatrician: Dr. Jhonson at [facility] in [place], [place].    Nutrition: Formula    Breast Pump:   No    formula fed    WIC:   Plans to enroll.     Essential Items: (includes car seat, crib/bassinet/pack-n-play, clothing, bottles, diapers, etc.)  Acquired     Transportation: Family/friends     Education: Information given on CPR classes; Physician/NNP daily rounds; and Postpartum Depression signs.     Potential Discharge Needs:  None

## 2019-10-22 NOTE — NURSING
Discussed feeding choice with mother.  Reviewed benefits of breastfeeding and risks of formula feeding. Mother states her intention is to exclusively formula feed; pt denies desire to latch or pump breast milk at this time.

## 2019-10-22 NOTE — NURSING
Pt vaginal bleeding slightly heavier than normal limits; CNM notified; cytotec 600 mcg given PO; will continue to monitor pt.

## 2019-10-22 NOTE — PROGRESS NOTES
[facility] -   Obstetrics  Postpartum Progress Note    Patient Name: Padmini Wren  MRN: [id number]  Admission Date: 10/20/2019  Hospital Length of Stay: 1 days  Attending Physician: Ezequiel Bailey MD  Primary Care Provider: Taryn Salcido MD    Subjective:     Principal Problem: (normal spontaneous vaginal delivery)    Hospital course: Admit for delivery  Plans NCB, anticipate   8:45- just finished with the breast pump, plans to walk around the unit. Given options for AROM vs expectant management. Patient would like to wait.    10/21/19 1533  10/22/19 PPD1 routine pp care     Interval History:     She is doing well this morning. She is tolerating a regular diet without nausea or vomiting. She is voiding spontaneously. She is ambulating. Vaginal bleeding is mild. She denies fever or chills. Abdominal pain is mild and controlled with oral medications. She is not breastfeeding.     Objective:     Vital Signs (Most Recent):  Temp: 98.5 °F (36.9 °C) (10/22/19 0800)  Pulse: 80 (10/22/19 0800)  Resp: 18 (10/22/19 0800)  BP: (!) 114/56 (10/22/19 0800)  SpO2: 97 % (10/21/19 0333) Vital Signs (24h Range):  Temp:  [97.1 °F (36.2 °C)-98.8 °F (37.1 °C)] 98.5 °F (36.9 °C)  Pulse:  [] 80  Resp:  [16-20] 18  BP: (103-134)/(56-82) 114/56     Weight: 108.9 kg (240 lb)  Body mass index is 43.9 kg/m².      Intake/Output Summary (Last 24 hours) at 10/22/2019 1016  Last data filed at 10/22/2019 0600  Gross per 24 hour   Intake --   Output 1000 ml   Net -1000 ml       Significant Labs:  Lab Results   Component Value Date    GROUPTRH A POS 10/21/2019    HEPBSAG Negative 2019    STREPBCULT  2018     STREPTOCOCCUS AGALACTIAE (GROUP B)  Beta-hemolytic streptococci are routinely susceptible to   penicillins,cephalosporins and carbapenems.       Recent Labs   Lab 10/21/19  0230   HGB 11.1*   HCT 35.5*       I have personallly reviewed all pertinent lab results from the last 24  hours.    Physical Exam:   Constitutional: She is oriented to person, place, and time. She appears well-developed and well-nourished.    HENT:   Head: Normocephalic.     Neck: Normal range of motion.    Cardiovascular: Normal rate, regular rhythm, normal heart sounds and intact distal pulses.     Pulmonary/Chest: Effort normal and breath sounds normal.        Abdominal: Soft.   Non tender      Genitourinary: Vagina normal and uterus normal.           Musculoskeletal: Normal range of motion and moves all extremeties.       Neurological: She is alert and oriented to person, place, and time.    Skin: Skin is warm and dry.    Psychiatric: She has a normal mood and affect. Her behavior is normal. Judgment and thought content normal.       Assessment/Plan:     24 y.o. female  for:    *  (normal spontaneous vaginal delivery)  Routine PP Care    Single liveborn, born in hospital  Care of infant per peds dept   Well baby, girl, at bedside, formula feeding    Chlamydia infection affecting pregnancy  Negative 19    History of GBS (group b Streptococcus) UTI, currently pregnant, first trimester  Care of infant per ped dept    Anemia during pregnancy in first trimester  CBC 11.1/35.5 on admit     Anxiety  Denies issues currently    Seizures  No meds, last seizure, unknown         Disposition: As patient meets milestones, will plan to discharge tomorrow.    Joseph Rogers CNM  Obstetrics  [facility] - [facility]

## 2019-10-22 NOTE — PLAN OF CARE
Pt afebrile and had no falls this shift. Fundus firm w/out massage and below umbilicus. Bleeding moderate, than light, no clots passed on mother baby unit. Voids spontaneously. Ambulates independently. Pain well controlled with oral pain medication. VSS at this time. Bonding well with infant; responds to infant cues and participates in infant care. Will continue to monitor.

## 2019-10-23 VITALS
BODY MASS INDEX: 44.16 KG/M2 | HEIGHT: 62 IN | DIASTOLIC BLOOD PRESSURE: 73 MMHG | TEMPERATURE: 98 F | WEIGHT: 240 LBS | SYSTOLIC BLOOD PRESSURE: 121 MMHG | RESPIRATION RATE: 18 BRPM | OXYGEN SATURATION: 97 % | HEART RATE: 72 BPM

## 2019-10-23 PROCEDURE — 11981 INSERTION OF NEXPLANON: ICD-10-PCS | Mod: ,,, | Performed by: ADVANCED PRACTICE MIDWIFE

## 2019-10-23 PROCEDURE — 63600175 PHARM REV CODE 636 W HCPCS: Performed by: ADVANCED PRACTICE MIDWIFE

## 2019-10-23 PROCEDURE — 25000003 PHARM REV CODE 250: Performed by: ADVANCED PRACTICE MIDWIFE

## 2019-10-23 PROCEDURE — 90686 IIV4 VACC NO PRSV 0.5 ML IM: CPT | Performed by: OBSTETRICS & GYNECOLOGY

## 2019-10-23 PROCEDURE — 63600175 PHARM REV CODE 636 W HCPCS: Performed by: OBSTETRICS & GYNECOLOGY

## 2019-10-23 PROCEDURE — 63600175 PHARM REV CODE 636 W HCPCS

## 2019-10-23 PROCEDURE — 99238 PR HOSPITAL DISCHARGE DAY,<30 MIN: ICD-10-PCS | Mod: 25,,, | Performed by: ADVANCED PRACTICE MIDWIFE

## 2019-10-23 PROCEDURE — 99238 HOSP IP/OBS DSCHRG MGMT 30/<: CPT | Mod: 25,,, | Performed by: ADVANCED PRACTICE MIDWIFE

## 2019-10-23 PROCEDURE — 11981 INSERTION DRUG DLVR IMPLANT: CPT | Mod: ,,, | Performed by: ADVANCED PRACTICE MIDWIFE

## 2019-10-23 PROCEDURE — 90471 IMMUNIZATION ADMIN: CPT | Performed by: OBSTETRICS & GYNECOLOGY

## 2019-10-23 RX ORDER — IBUPROFEN 600 MG/1
600 TABLET ORAL EVERY 6 HOURS
Qty: 30 TABLET | Refills: 0 | Status: SHIPPED | OUTPATIENT
Start: 2019-10-23 | End: 2022-08-04 | Stop reason: SDUPTHER

## 2019-10-23 RX ORDER — LIDOCAINE HYDROCHLORIDE 10 MG/ML
1 INJECTION, SOLUTION EPIDURAL; INFILTRATION; INTRACAUDAL; PERINEURAL ONCE
Status: COMPLETED | OUTPATIENT
Start: 2019-10-23 | End: 2019-10-23

## 2019-10-23 RX ADMIN — ETONOGESTREL 68 MG: 68 IMPLANT SUBCUTANEOUS at 08:10

## 2019-10-23 RX ADMIN — IBUPROFEN 600 MG: 600 TABLET, FILM COATED ORAL at 05:10

## 2019-10-23 RX ADMIN — LIDOCAINE HYDROCHLORIDE 10 MG: 10 INJECTION, SOLUTION EPIDURAL; INFILTRATION; INTRACAUDAL; PERINEURAL at 09:10

## 2019-10-23 RX ADMIN — INFLUENZA VIRUS VACCINE 0.5 ML: 15; 15; 15; 15 SUSPENSION INTRAMUSCULAR at 10:10

## 2019-10-23 NOTE — DISCHARGE INSTRUCTIONS

## 2019-10-23 NOTE — NURSING
Discharge instructions given, verbalized understanding.  Denies pain or any distress.  Reviewed follow-up appointments with patient.  Condition stable.

## 2019-10-23 NOTE — DISCHARGE SUMMARY
[facility] -   Obstetrics  Discharge Summary      Patient Name: Padmini Wren  MRN: [id number]  Admission Date: 10/20/2019  Hospital Length of Stay: 2 days  Discharge Date and Time:  10/23/2019 8:11 AM  Attending Physician: Ezequiel Bailey MD   Discharging Provider: Lilia Gomez CNM   Primary Care Provider: Taryn Salcido MD    HPI: Observation x's 3+ hours with cervical change to 6 cm        * No surgery found *     Hospital Course:   Admit for delivery  Plans NCB, anticipate   8:45- just finished with the breast pump, plans to walk around the unit. Given options for AROM vs expectant management. Patient would like to wait.    10/21/19 1533  10/22/19 PPD1 routine pp care   10/23/19 PPD #2 desires d/c formula feeding, nexplanon inserted in hospital, routine PP home care. al     Consults (From admission, onward)        Status Ordering Provider     Inpatient consult to Social Work  Once     Provider:  (Not yet assigned)    JONATHAN Allen          Final Active Diagnoses:    Diagnosis Date Noted POA    PRINCIPAL PROBLEM:   (normal spontaneous vaginal delivery) [O80] 2018 Not Applicable    Single liveborn, born in hospital [Z38.00] 10/21/2019 Unknown    Chlamydia infection affecting pregnancy [O98.819, A74.9] 2019 Yes    History of GBS (group b Streptococcus) UTI, currently pregnant, first trimester [O09.891, Z87.440] 2018 Not Applicable    Anemia during pregnancy in first trimester [O99.011] 02/10/2018 Yes    Anxiety [F41.9] 2017 Yes    Seizures [R56.9] 2014 Yes      Problems Resolved During this Admission:    Diagnosis Date Noted Date Resolved POA    Threatened labor at term [O47.9] 10/20/2019 10/21/2019 Yes    Normal labor [O80, Z37.9] 2018 10/22/2019 Not Applicable        Labs: CBC No results for input(s): WBC, HGB, HCT, PLT in the last 48 hours.    Feeding Method: bottle    Immunizations     Date Immunization Status  Dose Route/Site Given by    10/21/19 1648 MMR Incomplete 0.5 mL Subcutaneous/Left deltoid     10/21/19 1648 Tdap Incomplete 0.5 mL Intramuscular/Left deltoid           Delivery:    Episiotomy: None   Lacerations: None   Repair suture: None   Repair # of packets:     Blood loss (ml): 150     Birth information:  YOB: 2018   Time of birth: 3:33 PM   Sex: female   Delivery type: Vaginal, Spontaneous   Gestational Age: 38w6d    Delivery Clinician:      Other providers:       Additional  information:  Forceps:    Vacuum:    Breech:    Observed anomalies      Living?:           APGARS  One minute Five minutes Ten minutes   Skin color:         Heart rate:         Grimace:         Muscle tone:         Breathing:         Totals: 7  9        Placenta: Delivered:       appearance    Pending Diagnostic Studies:     None          Discharged Condition: good    Disposition: Home or Self Care    Follow Up:  Follow-up Information     Follow up In 4 weeks.               Patient Instructions:      Diet Adult Regular     Notify your health care provider if you experience any of the following:  temperature >100.4     Notify your health care provider if you experience any of the following:  persistent nausea and vomiting or diarrhea     Notify your health care provider if you experience any of the following:  severe uncontrolled pain     Notify your health care provider if you experience any of the following:  severe persistent headache     Notify your health care provider if you experience any of the following:   Order Comments: Saturating 2-3 pads an hour, signs of depression-excessive crying, unable to eat or sleep     Activity as tolerated     Medications:  Current Discharge Medication List      START taking these medications    Details   ibuprofen (ADVIL,MOTRIN) 600 MG tablet Take 1 tablet (600 mg total) by mouth every 6 (six) hours.  Qty: 30 tablet, Refills: 0             Lilia Gomez CNM  Obstetrics  [facility] [place]

## 2019-10-23 NOTE — PROCEDURES
"Padmini Wren is a 24 y.o. female patient.    Temp: 97.9 °F (36.6 °C) (10/23/19 0000)  Pulse: (!) 57 (10/23/19 0000)  Resp: 18 (10/23/19 0000)  BP: (!) 107/53 (10/23/19 0000)  SpO2: 97 % (10/21/19 0333)  Weight: 108.9 kg (240 lb) (10/20/19 2141)  Height: 5' 2" (157.5 cm) (10/20/19 2141)       Insertion of Nexplanon  Date/Time: 10/23/2019 8:41 AM  Performed by: Lilia Gomez CNM  Authorized by: Lilia Gomez CNM     Consent obtained:  Written  Consent given by:  Patient  Patient questions answered: yes    Patient agrees, verbalizes understanding, and wants to proceed: yes    Educational handouts given: yes    Instructions and paperwork completed: yes    Pre-procedure timeout performed: yes    Premeds:  Ibuprofen  Prepped with: povidone-iodine    Local anesthetic:  Lidocaine 1%  The site was cleaned and prepped in a sterile fashion: yes    Small stab incision was made in arm: yes    Left/right:  Left   68 mg etonogestrel 68 mg  Preloaded Implanon trocar was placed subdermally: yes    Visualization of implant was obtained: yes    Nexplanon was inserted and trocar removed: yes    Visualization of notch in stilette and palpitation of device: yes    Palpitation confirms placement by provider and patient: yes    Site was closed with steri-strips and pressure bandage applied: yes        Nexplanon INSERTION:    PRE-Nexplanon INSERTION COUNSELING:  All contraceptive options were reviewed and the patient chooses Nexplanon.  Patients history was reviewed and there were no contraindications to Nexplanon.  The procedure and minimal risks of pain, bleeding, bruising and infection at the insertion site discussed. Possible irregular menstrual bleeding pattern versus amenorrhea was explained.  No protection against STDs discussed.  Written information provided; all questions answered and patient agrees to proceed.  Consent signed and scanned into computer.    EXAM:  With patient in supine position the nondominant left " arm was flexed at the elbow and externally rotated.  The insertion site was identified 6-8 cm above the elbow crease at the inner side of the upper arm overlying the groove between biceps and triceps.    PROCEDURE:  The insertion site was prepped with antiseptic and injected with 2 cc of 1% Xylocaine without epinephrine subq along the planned insertion canal.    Using sterile technique the Nexplanon applicator was visually verified and removed from the blister pack.  The needle tip was inserted bevel side up at a 20 degree angle to penetrate the skin.  The applicator was lowered parallel to the arm and the skin was tented with the needle.  Once the needle was completely inserted, the Nexplanon was then deployed into the subcutaneous space.  The implant was palpable after insertion.  A steri strip was placed over the insertion site.  The patient tolerated the procedure well.    ASSESSMENT:  Nexplanon Insertion    POST Nexplanon INSERTION COUNSELING:  Manage post Implanon placement arm pain with NSAIDs  Keep arm elevated and apply intermittent ice packs to decrease pain and bruising for 24 Hours.  May remove bandage in 24 hours.  Nexplanon danger signs (worsening pain at insertion site, bleeding through bandage, redness and/or pus drainage at insertion site).  Removal in 3 years.    Lot # [id number]  Exp 2/25/22    Lilia Gomez  10/23/2019

## 2020-07-07 ENCOUNTER — TELEPHONE (OUTPATIENT)
Dept: OBSTETRICS AND GYNECOLOGY | Facility: CLINIC | Age: 25
End: 2020-07-07

## 2020-07-07 NOTE — TELEPHONE ENCOUNTER
"Patient has had Nexplanon since 10/23/2019 (in the hospital).  Patient got "bit" in her arm at the site and has been having issues with bleeding.  Patient would like to have the unit checked.  Appointment scheduled.  Visitor's policy and check in procedure explained and patient verbalized understanding.    "

## 2020-07-07 NOTE — TELEPHONE ENCOUNTER
----- Message from Monisha Alcantar sent at 7/7/2020  2:04 PM CDT -----  Regarding: birth control issues  Type:  Needs Medical Advice    Who Called: pt  Symptoms (please be specific): birth control   How long has patient had these symptoms:  n/a  Pharmacy name and phone #:  n/a  Would the patient rather a call back or a response via [facility]? Call back   Best Call Back Number: [phone]  Additional Information: Pt states that she needs to speak with someone in staff regards to  her birth control and she is now bleeding. Please call back

## 2020-10-01 ENCOUNTER — TELEPHONE (OUTPATIENT)
Dept: RHEUMATOLOGY | Facility: CLINIC | Age: 25
End: 2020-10-01

## 2020-10-01 NOTE — TELEPHONE ENCOUNTER
----- Message from Carol Ann sent at 10/1/2020  2:57 PM CDT -----  Pt called to schedule an appointment please reach out to pt at [phone]

## 2020-10-01 NOTE — TELEPHONE ENCOUNTER
Spoke to patient she states that she no longer needs an appt, she was referred somewhere else by her dr and already has an appt

## 2022-05-31 NOTE — PROGRESS NOTES
Doing well  28 week labs today, tdap today  US growth scan next OV. S>D     Coffective counseling sheet Keep Baby Close discussed with mother. Reinforced rooming in practices, continued skin to skin, and quiet hours as requested by mother.  Encouraged mother to download [employer] mobile ofelia if she has not already done so. Mother verbalizes understanding.   no

## 2022-07-01 ENCOUNTER — TELEPHONE (OUTPATIENT)
Dept: OBSTETRICS AND GYNECOLOGY | Facility: CLINIC | Age: 27
End: 2022-07-01
Payer: MEDICAID

## 2022-07-01 NOTE — TELEPHONE ENCOUNTER
----- Message from Lisa Aburto sent at 7/1/2022  3:11 PM CDT -----  Contact: Padmini Washington is requesting a call to schedule appt for removal of BC. Please call her back at [phone]            Thanks  DD

## 2022-07-22 ENCOUNTER — TELEPHONE (OUTPATIENT)
Dept: OBSTETRICS AND GYNECOLOGY | Facility: CLINIC | Age: 27
End: 2022-07-22
Payer: MEDICAID

## 2022-07-22 NOTE — TELEPHONE ENCOUNTER
----- Message from Pina Valiente sent at 7/21/2022  4:29 PM CDT -----  Regarding: Reschedule Appt  Contact: Patient  Patient is requesting a call back to reschedule appt for nexplanon removal  Please Assist     Patient can be reached at [phone]

## 2022-08-04 ENCOUNTER — TELEPHONE (OUTPATIENT)
Dept: OBSTETRICS AND GYNECOLOGY | Facility: CLINIC | Age: 27
End: 2022-08-04
Payer: MEDICAID

## 2022-08-04 ENCOUNTER — PROCEDURE VISIT (OUTPATIENT)
Dept: OBSTETRICS AND GYNECOLOGY | Facility: CLINIC | Age: 27
End: 2022-08-04
Payer: MEDICAID

## 2022-08-04 VITALS
DIASTOLIC BLOOD PRESSURE: 70 MMHG | WEIGHT: 229.75 LBS | BODY MASS INDEX: 42.02 KG/M2 | SYSTOLIC BLOOD PRESSURE: 110 MMHG

## 2022-08-04 DIAGNOSIS — Z30.011 BCP (BIRTH CONTROL PILLS) INITIATION: Primary | ICD-10-CM

## 2022-08-04 PROCEDURE — 99212 OFFICE O/P EST SF 10 MIN: CPT | Mod: 25,S$PBB,, | Performed by: NURSE PRACTITIONER

## 2022-08-04 PROCEDURE — 99212 PR OFFICE/OUTPT VISIT, EST, LEVL II, 10-19 MIN: ICD-10-PCS | Mod: 25,S$PBB,, | Performed by: NURSE PRACTITIONER

## 2022-08-04 RX ORDER — NORGESTIMATE AND ETHINYL ESTRADIOL 0.25-0.035
1 KIT ORAL DAILY
Qty: 28 TABLET | Refills: 11 | Status: SHIPPED | OUTPATIENT
Start: 2022-08-04 | End: 2023-01-03

## 2022-08-04 NOTE — TELEPHONE ENCOUNTER
----- Message from Suzette Alexis sent at 8/4/2022 10:05 AM CDT -----  Contact: patient  Padmini Wren would like a call back at [phone], in regards to rescheduling her procedure.

## 2022-08-04 NOTE — PROCEDURES
Desires to switch from Nexplanon to birth control pills as method of birth control     NEXPLANON REMOVAL    Exam: Nexplanon easily palpated in left upper extremity    Procedure: Skin overlying the device was prepped with alcohol.  2 cc 1% lidocaine without epinephrine was injected subcutaneously.  The skin was then prepped with betadine.  A 3mm incision was made in the left arm at the tip of the implant using a #11 scalpel.  The device was pushed toward the incision, grasped with hemostats, and was removed intact.  Hemostasis was achieved with gentle pressure.  The wound was dressed with a band-aid.  The patient tolerated the procedure well.    Post-procedure counseling: The patient was given pain, fever, and bleeding precautions.  Advised to use tylenol and ibuprofen prn pain.    RTC: prn

## 2022-08-04 NOTE — TELEPHONE ENCOUNTER
Spoke with patient. Was going to cancel however she found a ride and will be 10 minutes late.  Patient verbalized understanding.

## 2022-08-30 ENCOUNTER — TELEPHONE (OUTPATIENT)
Dept: OBSTETRICS AND GYNECOLOGY | Facility: CLINIC | Age: 27
End: 2022-08-30
Payer: MEDICAID

## 2022-08-30 ENCOUNTER — NURSE TRIAGE (OUTPATIENT)
Dept: ADMINISTRATIVE | Facility: CLINIC | Age: 27
End: 2022-08-30
Payer: MEDICAID

## 2022-08-30 NOTE — TELEPHONE ENCOUNTER
[place]    PCP:  None    She reports took 4 pregnancy tests a month ago; all were +.  Now C/O spotting.  Denies fever, abdominal pain, passed tissue, and previous ectopic pregnancy.  Per protocol, care advised is see today in office.  NOC unable to schedule d/t Speciality Provider.  She reports scheduled an appt but not until October.  OCA offered and declined.  RR is not available in her area.  Therefore, advised to UCC/ED.  Pt asked for ED near her that does L&D/Women's.  NOC googled and notified that [facility] in [place] available but unsure of ED.  # provided and pt to contact them to verify prior to arrival.  Instructed to call for worsening/questions/concerns.  VU.     Reason for Disposition   Patient wants to be seen    Additional Information   Negative: Shock suspected (e.g., cold/pale/clammy skin, too weak to stand)   Negative: Difficult to awaken or acting confused (e.g., disoriented, slurred speech)   Negative: Passed out (i.e., fainted, collapsed and was not responding)   Negative: Sounds like a life-threatening emergency to the triager   Negative: SEVERE abdominal pain (e.g., excruciating)   Negative: SEVERE vaginal bleeding (i.e., soaking 2 pads / hour, large blood clots) and present for 2 or more hours   Negative: MODERATE vaginal bleeding (i.e., soaking 1 pad) and present > 6 hours   Negative: MODERATE vaginal bleeding (i.e., soaking 1 pad / hour, clots) and pregnant > 12 weeks   Negative: MODERATE vaginal bleeding (i.e., soaking 1 pad / hour; clots) and pregnant > 12 weeks   Negative: Passed tissue (e.g., gray-white)   Negative: Shoulder pain   Negative: Constant abdominal pain lasting > 1 hour   Negative: Fever > 100.4 F (38.0 C)   Negative: Pale skin (pallor) of new onset or worsening   Negative: Patient sounds very sick or weak to the triager   Negative: Intermittent lower abdominal pain (e.g., cramping) lasting > 24 hours   Negative: Burning with urination   Negative: Prior history of 'ectopic  pregnancy' or previous tubal surgery (e.g., tubal ligation)    Protocols used: Pregnancy - Vaginal Bleeding Less Than 20 Weeks EGA-A-OH

## 2023-01-03 ENCOUNTER — OFFICE VISIT (OUTPATIENT)
Dept: OBSTETRICS AND GYNECOLOGY | Facility: CLINIC | Age: 28
DRG: 817 | End: 2023-01-03
Payer: MEDICAID

## 2023-01-03 VITALS
SYSTOLIC BLOOD PRESSURE: 122 MMHG | BODY MASS INDEX: 42.92 KG/M2 | DIASTOLIC BLOOD PRESSURE: 65 MMHG | WEIGHT: 233.25 LBS | HEIGHT: 62 IN

## 2023-01-03 DIAGNOSIS — K80.20 GALLSTONES: ICD-10-CM

## 2023-01-03 DIAGNOSIS — O99.210 OBESITY AFFECTING PREGNANCY, ANTEPARTUM: ICD-10-CM

## 2023-01-03 DIAGNOSIS — Z13.79 GENETIC SCREENING: ICD-10-CM

## 2023-01-03 DIAGNOSIS — Z32.01 POSITIVE PREGNANCY TEST: Primary | ICD-10-CM

## 2023-01-03 PROBLEM — Z87.19 HISTORY OF GALLSTONES: Status: RESOLVED | Noted: 2017-07-28 | Resolved: 2023-01-03

## 2023-01-03 PROBLEM — O09.891 HISTORY OF GBS (GROUP B STREPTOCOCCUS) UTI, CURRENTLY PREGNANT, FIRST TRIMESTER: Status: RESOLVED | Noted: 2018-03-19 | Resolved: 2023-01-03

## 2023-01-03 PROBLEM — O98.819 CHLAMYDIA INFECTION AFFECTING PREGNANCY: Status: RESOLVED | Noted: 2019-04-19 | Resolved: 2023-01-03

## 2023-01-03 PROBLEM — Z87.440 HISTORY OF GBS (GROUP B STREPTOCOCCUS) UTI, CURRENTLY PREGNANT, FIRST TRIMESTER: Status: RESOLVED | Noted: 2018-03-19 | Resolved: 2023-01-03

## 2023-01-03 PROBLEM — A74.9 CHLAMYDIA INFECTION AFFECTING PREGNANCY: Status: RESOLVED | Noted: 2019-04-19 | Resolved: 2023-01-03

## 2023-01-03 PROBLEM — O99.011 ANEMIA DURING PREGNANCY IN FIRST TRIMESTER: Status: RESOLVED | Noted: 2018-02-10 | Resolved: 2023-01-03

## 2023-01-03 PROCEDURE — 99999 PR PBB SHADOW E&M-EST. PATIENT-LVL II: CPT | Mod: PBBFAC,,, | Performed by: ADVANCED PRACTICE MIDWIFE

## 2023-01-03 PROCEDURE — 3074F SYST BP LT 130 MM HG: CPT | Mod: CPTII,,, | Performed by: ADVANCED PRACTICE MIDWIFE

## 2023-01-03 PROCEDURE — 99214 OFFICE O/P EST MOD 30 MIN: CPT | Mod: S$PBB,TH,, | Performed by: ADVANCED PRACTICE MIDWIFE

## 2023-01-03 PROCEDURE — 87086 URINE CULTURE/COLONY COUNT: CPT | Performed by: ADVANCED PRACTICE MIDWIFE

## 2023-01-03 PROCEDURE — 3008F PR BODY MASS INDEX (BMI) DOCUMENTED: ICD-10-PCS | Mod: CPTII,,, | Performed by: ADVANCED PRACTICE MIDWIFE

## 2023-01-03 PROCEDURE — 99214 PR OFFICE/OUTPT VISIT, EST, LEVL IV, 30-39 MIN: ICD-10-PCS | Mod: S$PBB,TH,, | Performed by: ADVANCED PRACTICE MIDWIFE

## 2023-01-03 PROCEDURE — 3008F BODY MASS INDEX DOCD: CPT | Mod: CPTII,,, | Performed by: ADVANCED PRACTICE MIDWIFE

## 2023-01-03 PROCEDURE — 99212 OFFICE O/P EST SF 10 MIN: CPT | Mod: PBBFAC,TH,PO | Performed by: ADVANCED PRACTICE MIDWIFE

## 2023-01-03 PROCEDURE — 1159F PR MEDICATION LIST DOCUMENTED IN MEDICAL RECORD: ICD-10-PCS | Mod: CPTII,,, | Performed by: ADVANCED PRACTICE MIDWIFE

## 2023-01-03 PROCEDURE — 1159F MED LIST DOCD IN RCRD: CPT | Mod: CPTII,,, | Performed by: ADVANCED PRACTICE MIDWIFE

## 2023-01-03 PROCEDURE — 88175 CYTOPATH C/V AUTO FLUID REDO: CPT | Performed by: ADVANCED PRACTICE MIDWIFE

## 2023-01-03 PROCEDURE — 3078F DIAST BP <80 MM HG: CPT | Mod: CPTII,,, | Performed by: ADVANCED PRACTICE MIDWIFE

## 2023-01-03 PROCEDURE — 87591 N.GONORRHOEAE DNA AMP PROB: CPT | Performed by: ADVANCED PRACTICE MIDWIFE

## 2023-01-03 PROCEDURE — 3074F PR MOST RECENT SYSTOLIC BLOOD PRESSURE < 130 MM HG: ICD-10-PCS | Mod: CPTII,,, | Performed by: ADVANCED PRACTICE MIDWIFE

## 2023-01-03 PROCEDURE — 99999 PR PBB SHADOW E&M-EST. PATIENT-LVL II: ICD-10-PCS | Mod: PBBFAC,,, | Performed by: ADVANCED PRACTICE MIDWIFE

## 2023-01-03 PROCEDURE — 3078F PR MOST RECENT DIASTOLIC BLOOD PRESSURE < 80 MM HG: ICD-10-PCS | Mod: CPTII,,, | Performed by: ADVANCED PRACTICE MIDWIFE

## 2023-01-03 RX ORDER — ONDANSETRON 4 MG/1
4 TABLET, ORALLY DISINTEGRATING ORAL EVERY 6 HOURS PRN
COMMUNITY
Start: 2022-12-31 | End: 2023-01-31

## 2023-01-03 RX ORDER — FAMOTIDINE 20 MG/1
20 TABLET, FILM COATED ORAL 2 TIMES DAILY
COMMUNITY
Start: 2022-12-31 | End: 2023-01-31

## 2023-01-03 NOTE — PROGRESS NOTES
CHIEF COMPLAINT:   Patient presents with      Possible Pregnancy        HISTORY OF PRESENT ILLNESS  Padmini Wren 27 y.o.  presents for pregnancy risk assessment.   The patient has no complaints today.  No nausea or vomiting. No bleeding or pain.  Pregnancy was planned and is desired.  Partner is supportive of pregnancy.  This is new partner, he has 5y/o daughter. Lives at home with children and partner.  Denies domestic abuse.  Denies chemical/pesticide/radiation exposure.  OB history:      LMP: No LMP recorded (lmp unknown). Patient has had an implant.  HECTOR: 2023  EGA: 7w2d      Health Maintenance   Topic Date Due    Pap Smear  2020    TETANUS VACCINE  2029    Hepatitis C Screening  Completed    Lipid Panel  Completed    HPV Vaccines  Completed       Past Medical History:   Diagnosis Date    Anxiety     Arthritis     Asthma     has not used inhalers in years    JRA (juvenile rheumatoid arthritis)     Seizure     not on medications. has not had one since childhood    Seizures        Past Surgical History:   Procedure Laterality Date    ANKLE FRACTURE SURGERY      CYST REMOVAL      tubes ears         Family History   Problem Relation Age of Onset    Inflammatory bowel disease Mother     Asthma Mother     Asthma Brother     Asthma Brother        Social History     Socioeconomic History    Marital status:    Tobacco Use    Smoking status: Never    Smokeless tobacco: Never   Substance and Sexual Activity    Alcohol use: Not Currently    Drug use: No    Sexual activity: Yes     Partners: Male     Birth control/protection: None   Social History Narrative    ** Merged History Encounter **            Current Outpatient Medications   Medication Sig Dispense Refill    famotidine (PEPCID) 20 MG tablet Take 20 mg by mouth 2 (two) times daily.      ondansetron (ZOFRAN-ODT) 4 MG TbDL Take 4 mg by mouth every 6 (six) hours as needed.       No current facility-administered  medications for this visit.       Review of patient's allergies indicates:  No Known Allergies      PHYSICAL EXAM   Vitals:    01/03/23 0844   BP: 122/65        PAIN SCALE: 0/10 None    PHYSICAL EXAM    ROS:  GENERAL: No fever, chills, fatigability or weight loss.  CV: Denies chest pain  PULM: Denies shortness of breath or wheezing.  ABDOMEN: Appetite fine. No weight loss. Denies diarrhea, abdominal pain, hematemesis or blood in stool.  URINARY: No flank pain, dysuria or hematuria.  REPRODUCTIVE: No abnormal vaginal bleeding.       PE:   APPEARANCE: Well nourished, well developed, in no acute distress  CHEST: Clear to auscultation bilaterally  CV: Regular rate and rhythm  ABDOMEN: Soft. No tenderness or masses. No hepatosplenomegaly. No hernias  PELVIC:   VULVA: No lesions. Normal female genitalia.  URETHRAL MEATUS: Normal size and location, no lesions, no prolapse.  URETHRA: No masses, tenderness, prolapse or scarring.  VAGINA: Moist and well rugated, no discharge, no significant cystocele or rectocele.  CERVIX: No lesions, normal diameter, no stenosis, no cervical motion tenderness.   UTERUS: 8wk size, regular shape, mobile, non-tender, normal position, good support.  ADNEXA: No masses, tenderness or CDS nodularity.  ANUS PERINEUM: No lesions, no relaxation, no external hemorrhoids.     UPT +  Ultrasound done at [facility] with PCP on 12/21/2022, 5w3d and HECTOR 8/20/2023.    A/P:     -      Patient was counseled today on A.C.S. Pap guidelines and recommendations for yearly pelvic exams, mammograms and monthly self breast exams; to see her PCP for other health maintenance and pregnancy.   -      Patient's medications and medical history reviewed with patient and implications in pregnancy.   -      Pregnancy course discussed and 'AtoZ' book given. Patient was counseled on proper weight gain based on the [place] [employer]'s recommendations based on her pre-pregnancy weight. Discussed foods to avoid in pregnancy (i.e.  sushi, fish that are high in mercury, deli meat, and unpasteurized cheeses). Discussed prenatal vitamin options (i.e. stool softener, DHA). Discussed potential medical problems in pregnancy.  -     Discussed risk of Toxoplasmosis transmission from pets and reviewed risk reduction techniques.  -     Chromosomal abnormality risk discussed and available testing offered - would like in a couple of week, paperwork given.   -     Pt was counseled on exercise in pregnancy and weight gain recommendations.  -     Familiar with the practice including CNM collaboration. Goes natural with all births.   -     Follow-up initial OB  -     Pap smear and genprobe collected today   -     Labs today     I spent a total of 30 minutes on the day of the visit.This includes face to face time and non-face to face time preparing to see the patient (eg, review of tests), obtaining and/or reviewing separately obtained history, documenting clinical information in the electronic or other health record, independently interpreting results and communicating results to the patient/family/caregiver, or care coordinator.

## 2023-01-04 ENCOUNTER — TELEPHONE (OUTPATIENT)
Dept: OBSTETRICS AND GYNECOLOGY | Facility: CLINIC | Age: 28
End: 2023-01-04
Payer: MEDICAID

## 2023-01-04 ENCOUNTER — HOSPITAL ENCOUNTER (INPATIENT)
Facility: HOSPITAL | Age: 28
LOS: 2 days | Discharge: HOME | DRG: 817 | End: 2023-01-06
Attending: FAMILY MEDICINE | Admitting: SURGERY
Payer: MEDICAID

## 2023-01-04 DIAGNOSIS — K81.0 ACUTE CHOLECYSTITIS: Primary | ICD-10-CM

## 2023-01-04 LAB
ALBUMIN SERPL BCP-MCNC: 3.3 G/DL (ref 3.5–5.2)
ALP SERPL-CCNC: 106 U/L (ref 55–135)
ALT SERPL W/O P-5'-P-CCNC: 277 U/L (ref 10–44)
ANION GAP SERPL CALC-SCNC: 8 MMOL/L (ref 8–16)
AST SERPL-CCNC: 251 U/L (ref 10–40)
BACTERIA #/AREA URNS HPF: ABNORMAL /HPF
BASOPHILS # BLD AUTO: 0.02 K/UL (ref 0–0.2)
BASOPHILS NFR BLD: 0.2 % (ref 0–1.9)
BILIRUB SERPL-MCNC: 0.3 MG/DL (ref 0.1–1)
BILIRUB UR QL STRIP: NEGATIVE
BUN SERPL-MCNC: 7 MG/DL (ref 6–20)
CALCIUM SERPL-MCNC: 9.3 MG/DL (ref 8.7–10.5)
CHLORIDE SERPL-SCNC: 107 MMOL/L (ref 95–110)
CLARITY UR: CLEAR
CO2 SERPL-SCNC: 24 MMOL/L (ref 23–29)
COLOR UR: YELLOW
CREAT SERPL-MCNC: 0.6 MG/DL (ref 0.5–1.4)
DIFFERENTIAL METHOD: ABNORMAL
EOSINOPHIL # BLD AUTO: 0.2 K/UL (ref 0–0.5)
EOSINOPHIL NFR BLD: 1.6 % (ref 0–8)
ERYTHROCYTE [DISTWIDTH] IN BLOOD BY AUTOMATED COUNT: 12.5 % (ref 11.5–14.5)
EST. GFR  (NO RACE VARIABLE): >60 ML/MIN/1.73 M^2
GLUCOSE SERPL-MCNC: 109 MG/DL (ref 70–110)
GLUCOSE UR QL STRIP: NEGATIVE
HCT VFR BLD AUTO: 40.7 % (ref 37–48.5)
HGB BLD-MCNC: 13 G/DL (ref 12–16)
HGB UR QL STRIP: NEGATIVE
IMM GRANULOCYTES # BLD AUTO: 0.04 K/UL (ref 0–0.04)
IMM GRANULOCYTES NFR BLD AUTO: 0.4 % (ref 0–0.5)
KETONES UR QL STRIP: NEGATIVE
LEUKOCYTE ESTERASE UR QL STRIP: ABNORMAL
LIPASE SERPL-CCNC: 16 U/L (ref 4–60)
LYMPHOCYTES # BLD AUTO: 2.3 K/UL (ref 1–4.8)
LYMPHOCYTES NFR BLD: 22.2 % (ref 18–48)
MCH RBC QN AUTO: 29.5 PG (ref 27–31)
MCHC RBC AUTO-ENTMCNC: 31.9 G/DL (ref 32–36)
MCV RBC AUTO: 93 FL (ref 82–98)
MICROSCOPIC COMMENT: ABNORMAL
MONOCYTES # BLD AUTO: 0.5 K/UL (ref 0.3–1)
MONOCYTES NFR BLD: 5.1 % (ref 4–15)
NEUTROPHILS # BLD AUTO: 7.3 K/UL (ref 1.8–7.7)
NEUTROPHILS NFR BLD: 70.5 % (ref 38–73)
NITRITE UR QL STRIP: NEGATIVE
NRBC BLD-RTO: 0 /100 WBC
PH UR STRIP: 6 [PH] (ref 5–8)
PLATELET # BLD AUTO: 294 K/UL (ref 150–450)
PMV BLD AUTO: 9.7 FL (ref 9.2–12.9)
POTASSIUM SERPL-SCNC: 4.1 MMOL/L (ref 3.5–5.1)
PROT SERPL-MCNC: 7.3 G/DL (ref 6–8.4)
PROT UR QL STRIP: NEGATIVE
RBC # BLD AUTO: 4.4 M/UL (ref 4–5.4)
RBC #/AREA URNS HPF: 1 /HPF (ref 0–4)
SODIUM SERPL-SCNC: 139 MMOL/L (ref 136–145)
SP GR UR STRIP: 1.02 (ref 1–1.03)
SQUAMOUS #/AREA URNS HPF: 4 /HPF
URN SPEC COLLECT METH UR: ABNORMAL
UROBILINOGEN UR STRIP-ACNC: NEGATIVE EU/DL
WBC # BLD AUTO: 10.34 K/UL (ref 3.9–12.7)
WBC #/AREA URNS HPF: 7 /HPF (ref 0–5)
WBC CLUMPS URNS QL MICRO: ABNORMAL

## 2023-01-04 PROCEDURE — 25000003 PHARM REV CODE 250: Performed by: FAMILY MEDICINE

## 2023-01-04 PROCEDURE — G0378 HOSPITAL OBSERVATION PER HR: HCPCS

## 2023-01-04 PROCEDURE — 11000001 HC ACUTE MED/SURG PRIVATE ROOM

## 2023-01-04 PROCEDURE — 63600175 PHARM REV CODE 636 W HCPCS: Performed by: FAMILY MEDICINE

## 2023-01-04 PROCEDURE — 63600175 PHARM REV CODE 636 W HCPCS: Performed by: SURGERY

## 2023-01-04 PROCEDURE — 99285 EMERGENCY DEPT VISIT HI MDM: CPT | Mod: 25

## 2023-01-04 PROCEDURE — 83690 ASSAY OF LIPASE: CPT | Performed by: NURSE PRACTITIONER

## 2023-01-04 PROCEDURE — 25000003 PHARM REV CODE 250: Performed by: SURGERY

## 2023-01-04 PROCEDURE — 85025 COMPLETE CBC W/AUTO DIFF WBC: CPT | Performed by: NURSE PRACTITIONER

## 2023-01-04 PROCEDURE — 96365 THER/PROPH/DIAG IV INF INIT: CPT

## 2023-01-04 PROCEDURE — 80053 COMPREHEN METABOLIC PANEL: CPT | Performed by: NURSE PRACTITIONER

## 2023-01-04 PROCEDURE — 96375 TX/PRO/DX INJ NEW DRUG ADDON: CPT

## 2023-01-04 PROCEDURE — 81000 URINALYSIS NONAUTO W/SCOPE: CPT | Performed by: NURSE PRACTITIONER

## 2023-01-04 RX ORDER — HYDROCODONE BITARTRATE AND ACETAMINOPHEN 5; 325 MG/1; MG/1
1 TABLET ORAL EVERY 4 HOURS PRN
Status: DISCONTINUED | OUTPATIENT
Start: 2023-01-04 | End: 2023-01-05

## 2023-01-04 RX ORDER — SODIUM CHLORIDE 0.9 % (FLUSH) 0.9 %
10 SYRINGE (ML) INJECTION
Status: DISCONTINUED | OUTPATIENT
Start: 2023-01-04 | End: 2023-01-06 | Stop reason: HOSPADM

## 2023-01-04 RX ORDER — HYDROMORPHONE HYDROCHLORIDE 2 MG/ML
1 INJECTION, SOLUTION INTRAMUSCULAR; INTRAVENOUS; SUBCUTANEOUS EVERY 4 HOURS PRN
Status: DISCONTINUED | OUTPATIENT
Start: 2023-01-04 | End: 2023-01-06 | Stop reason: HOSPADM

## 2023-01-04 RX ORDER — MORPHINE SULFATE 4 MG/ML
4 INJECTION, SOLUTION INTRAMUSCULAR; INTRAVENOUS
Status: COMPLETED | OUTPATIENT
Start: 2023-01-04 | End: 2023-01-04

## 2023-01-04 RX ORDER — SODIUM CHLORIDE 9 MG/ML
1000 INJECTION, SOLUTION INTRAVENOUS
Status: COMPLETED | OUTPATIENT
Start: 2023-01-04 | End: 2023-01-04

## 2023-01-04 RX ORDER — ONDANSETRON 2 MG/ML
4 INJECTION INTRAMUSCULAR; INTRAVENOUS EVERY 6 HOURS PRN
Status: DISCONTINUED | OUTPATIENT
Start: 2023-01-05 | End: 2023-01-05

## 2023-01-04 RX ORDER — ACETAMINOPHEN 325 MG/1
650 TABLET ORAL EVERY 8 HOURS PRN
Status: DISCONTINUED | OUTPATIENT
Start: 2023-01-04 | End: 2023-01-05

## 2023-01-04 RX ORDER — ONDANSETRON 8 MG/1
8 TABLET, ORALLY DISINTEGRATING ORAL EVERY 8 HOURS PRN
Status: DISCONTINUED | OUTPATIENT
Start: 2023-01-04 | End: 2023-01-04

## 2023-01-04 RX ORDER — TALC
6 POWDER (GRAM) TOPICAL NIGHTLY PRN
Status: DISCONTINUED | OUTPATIENT
Start: 2023-01-04 | End: 2023-01-06 | Stop reason: HOSPADM

## 2023-01-04 RX ORDER — FAMOTIDINE 20 MG/1
20 TABLET, FILM COATED ORAL 2 TIMES DAILY
Status: DISCONTINUED | OUTPATIENT
Start: 2023-01-04 | End: 2023-01-06 | Stop reason: HOSPADM

## 2023-01-04 RX ORDER — LIDOCAINE HYDROCHLORIDE 10 MG/ML
1 INJECTION, SOLUTION EPIDURAL; INFILTRATION; INTRACAUDAL; PERINEURAL ONCE AS NEEDED
Status: DISCONTINUED | OUTPATIENT
Start: 2023-01-04 | End: 2023-01-06 | Stop reason: HOSPADM

## 2023-01-04 RX ORDER — SODIUM CHLORIDE, SODIUM LACTATE, POTASSIUM CHLORIDE, CALCIUM CHLORIDE 600; 310; 30; 20 MG/100ML; MG/100ML; MG/100ML; MG/100ML
INJECTION, SOLUTION INTRAVENOUS CONTINUOUS
Status: DISCONTINUED | OUTPATIENT
Start: 2023-01-04 | End: 2023-01-05

## 2023-01-04 RX ORDER — HYDROCODONE BITARTRATE AND ACETAMINOPHEN 7.5; 325 MG/1; MG/1
1 TABLET ORAL EVERY 6 HOURS PRN
Status: DISCONTINUED | OUTPATIENT
Start: 2023-01-04 | End: 2023-01-05

## 2023-01-04 RX ORDER — METOCLOPRAMIDE HYDROCHLORIDE 5 MG/ML
5 INJECTION INTRAMUSCULAR; INTRAVENOUS EVERY 6 HOURS PRN
Status: DISCONTINUED | OUTPATIENT
Start: 2023-01-04 | End: 2023-01-04

## 2023-01-04 RX ADMIN — PIPERACILLIN SODIUM AND TAZOBACTAM SODIUM 4.5 G: 4; .5 INJECTION, POWDER, LYOPHILIZED, FOR SOLUTION INTRAVENOUS at 06:01

## 2023-01-04 RX ADMIN — SODIUM CHLORIDE 1000 ML: 9 INJECTION, SOLUTION INTRAVENOUS at 06:01

## 2023-01-04 RX ADMIN — ONDANSETRON 4 MG: 2 INJECTION INTRAMUSCULAR; INTRAVENOUS at 11:01

## 2023-01-04 RX ADMIN — FAMOTIDINE 20 MG: 20 TABLET ORAL at 10:01

## 2023-01-04 RX ADMIN — MORPHINE SULFATE 4 MG: 4 INJECTION INTRAVENOUS at 07:01

## 2023-01-04 RX ADMIN — HYDROMORPHONE HYDROCHLORIDE 1 MG: 2 INJECTION INTRAMUSCULAR; INTRAVENOUS; SUBCUTANEOUS at 10:01

## 2023-01-04 RX ADMIN — SODIUM CHLORIDE, POTASSIUM CHLORIDE, SODIUM LACTATE AND CALCIUM CHLORIDE: 600; 310; 30; 20 INJECTION, SOLUTION INTRAVENOUS at 10:01

## 2023-01-04 NOTE — FIRST PROVIDER EVALUATION
Medical screening examination initiated.  I have conducted a focused provider triage encounter, findings are as follows:    Brief history of present illness:  Patient presents with upper abdominal pain, nausea and vomiting.  Patient states she is 7 weeks pregnant.  Seen at [facility] a few days ago and diagnosed with gallstones.    Vitals:    01/04/23 1433   BP: (!) 149/85   BP Location: Right arm   Patient Position: Sitting   Pulse: 79   Resp: 20   Temp: 98.3 °F (36.8 °C)   TempSrc: Oral   SpO2: 100%   Weight: 105 kg (231 lb 7.7 oz)       Pertinent physical exam:      Brief workup plan:      Preliminary workup initiated; this workup will be continued and followed by the physician or advanced practice provider that is assigned to the patient when roomed.

## 2023-01-04 NOTE — ED PROVIDER NOTES
Encounter Date: 1/4/2023       History     Chief Complaint   Patient presents with    Emesis     Hx gallstones, [facility] but she can't keep the meds down due to vomiting, RUQ abdominal pain     28 yo female 7 weeks pregnant who reports RUQ abdominal pain and N/V that she associates with gallbladder problems.  Was told she had gallstones in 2016 and has intermittent problems with them especially during pregnancy.  Was seen at [facility] yesterday and now vomiting up her meds they gave her, GI cocktail and pepcid.    Review of patient's allergies indicates:  No Known Allergies  Past Medical History:   Diagnosis Date    Anxiety     Arthritis     Asthma     has not used inhalers in years    JRA (juvenile rheumatoid arthritis)     Seizure     not on medications. has not had one since childhood    Seizures      Past Surgical History:   Procedure Laterality Date    ANKLE FRACTURE SURGERY      CYST REMOVAL      tubes ears       Family History   Problem Relation Age of Onset    Inflammatory bowel disease Mother     Asthma Mother     Asthma Brother     Asthma Brother      Social History     Tobacco Use    Smoking status: Never    Smokeless tobacco: Never   Substance Use Topics    Alcohol use: Not Currently    Drug use: No     Review of Systems   Constitutional: Negative.    HENT: Negative.     Eyes: Negative.    Respiratory: Negative.     Cardiovascular: Negative.    Gastrointestinal:  Positive for abdominal pain, nausea and vomiting.   Endocrine: Negative.    Genitourinary: Negative.    Musculoskeletal: Negative.    Skin: Negative.    Allergic/Immunologic: Negative.    Neurological: Negative.    Hematological: Negative.    Psychiatric/Behavioral: Negative.       Physical Exam     Initial Vitals [01/04/23 1433]   BP Pulse Resp Temp SpO2   (!) 149/85 79 20 98.3 °F (36.8 °C) 100 %      MAP       --         Physical Exam    Constitutional: She appears well-developed and well-nourished.   HENT:   Head:  Normocephalic and atraumatic.   Eyes: EOM are normal. Pupils are equal, round, and reactive to light.   Neck: Neck supple.   Normal range of motion.  Cardiovascular:  Normal rate and regular rhythm.           Pulmonary/Chest: Breath sounds normal. She is in respiratory distress. She has no wheezes.   Abdominal: Abdomen is soft. Bowel sounds are normal.   Right upper quadrant and episgastric tenderness   Musculoskeletal:         General: Normal range of motion.      Cervical back: Normal range of motion and neck supple.     Neurological: She is alert and oriented to person, place, and time. GCS score is 15. GCS eye subscore is 4. GCS verbal subscore is 5. GCS motor subscore is 6.   Skin: Skin is warm and dry. Capillary refill takes less than 2 seconds.   Psychiatric: She has a normal mood and affect. Thought content normal.       ED Course   Procedures  Labs Reviewed   CBC W/ AUTO DIFFERENTIAL - Abnormal; Notable for the following components:       Result Value    MCHC 31.9 (*)     All other components within normal limits   COMPREHENSIVE METABOLIC PANEL - Abnormal; Notable for the following components:    Albumin 3.3 (*)      (*)      (*)     All other components within normal limits   URINALYSIS, REFLEX TO URINE CULTURE - Abnormal; Notable for the following components:    Leukocytes, UA 3+ (*)     All other components within normal limits    Narrative:     Specimen Source->Urine   URINALYSIS MICROSCOPIC - Abnormal; Notable for the following components:    WBC, UA 7 (*)     WBC Clumps, UA Few (*)     All other components within normal limits    Narrative:     Specimen Source->Urine   LIPASE   PREGNANCY TEST, URINE RAPID          Imaging Results              US Abdomen Limited (Final result)  Result time 01/04/23 18:16:56      Final result by Ramirez Bynum MD (01/04/23 18:16:56)                   Impression:      Contracted gallbladder with multiple gallstones.  Positive sonographic Toribio sign.   Dilated common bile duct measuring 9 mm.  Correlate clinically for cholecystitis    Fatty infiltration of the liver      Electronically signed by: Fletcher Ramirez  Date:    01/04/2023  Time:    18:16               Narrative:    EXAMINATION:  US ABDOMEN LIMITED    CLINICAL HISTORY:  abdominal pain;    TECHNIQUE:  Limited ultrasound of the right upper quadrant of the abdomen (including pancreas, liver, gallbladder, common bile duct, and spleen) was performed.    COMPARISON:  None.    FINDINGS:  Liver: Normal in size, measuring 16.5 cm.Fatty infiltration of the liver.    Gallbladder: Contracted gallbladder with multiple.  Positive sonographic Toribio's sign.    Biliary system: The common duct is dilated, measuring 9 mm.  Mild intrahepatic ductal dilatation.    Right kidney: Normal in size and echotexture, measuring 12.3 cm.    Miscellaneous: No upper abdominal ascites.                                       Medications   famotidine tablet 20 mg (20 mg Oral Given 1/4/23 2204)   LIDOcaine (PF) 10 mg/ml (1%) injection 10 mg (has no administration in time range)   sodium chloride 0.9% flush 10 mL (has no administration in time range)   melatonin tablet 6 mg (has no administration in time range)   acetaminophen tablet 650 mg (has no administration in time range)   lactated ringers infusion ( Intravenous New Bag 1/4/23 2206)   HYDROmorphone (PF) injection 1 mg (1 mg Intravenous Given 1/4/23 2214)   HYDROcodone-acetaminophen 7.5-325 mg per tablet 1 tablet (has no administration in time range)   HYDROcodone-acetaminophen 5-325 mg per tablet 1 tablet (has no administration in time range)   ondansetron injection 4 mg (4 mg Intravenous Given 1/4/23 6764)   piperacillin-tazobactam (ZOSYN) 4.5 g in dextrose 5 % in water (D5W) 5 % 100 mL IVPB (MB+) (0 g Intravenous Stopped 1/4/23 1927)   0.9%  NaCl infusion (1,000 mLs Intravenous New Bag 1/4/23 1857)   morphine injection 4 mg (4 mg Intravenous Given 1/4/23 1945)                               Clinical Impression:   Final diagnoses:  [K81.0] Acute cholecystitis (Primary)        ED Disposition Condition    Observation Stable                Ariane Dyson MD  01/05/23 0116

## 2023-01-04 NOTE — TELEPHONE ENCOUNTER
Patient called stating she was told on yesterday, that if she is still having issues with her gallbladder to call back. Patient states she is still having issues today, Patient instructed to go to [facility] to be assessed due to the CNM's will be able to track all of her care. Patient verbalized understanding.

## 2023-01-05 ENCOUNTER — ANESTHESIA EVENT (OUTPATIENT)
Dept: SURGERY | Facility: HOSPITAL | Age: 28
DRG: 817 | End: 2023-01-05
Payer: MEDICAID

## 2023-01-05 ENCOUNTER — ANESTHESIA (OUTPATIENT)
Dept: SURGERY | Facility: HOSPITAL | Age: 28
DRG: 817 | End: 2023-01-05
Payer: MEDICAID

## 2023-01-05 PROBLEM — Z34.90 PREGNANT: Status: ACTIVE | Noted: 2023-01-05

## 2023-01-05 PROBLEM — E66.01 MATERNAL MORBID OBESITY, ANTEPARTUM: Status: ACTIVE | Noted: 2017-11-28

## 2023-01-05 PROBLEM — K80.00 CHOLELITHIASIS WITH ACUTE CHOLECYSTITIS: Status: ACTIVE | Noted: 2023-01-05

## 2023-01-05 LAB
ALBUMIN SERPL BCP-MCNC: 3.2 G/DL (ref 3.5–5.2)
ALP SERPL-CCNC: 156 U/L (ref 55–135)
ALT SERPL W/O P-5'-P-CCNC: 439 U/L (ref 10–44)
ANION GAP SERPL CALC-SCNC: 9 MMOL/L (ref 8–16)
AST SERPL-CCNC: 297 U/L (ref 10–40)
BASOPHILS # BLD AUTO: 0.03 K/UL (ref 0–0.2)
BASOPHILS NFR BLD: 0.2 % (ref 0–1.9)
BILIRUB SERPL-MCNC: 0.8 MG/DL (ref 0.1–1)
BUN SERPL-MCNC: 7 MG/DL (ref 6–20)
C TRACH DNA SPEC QL NAA+PROBE: NOT DETECTED
CALCIUM SERPL-MCNC: 9.3 MG/DL (ref 8.7–10.5)
CHLORIDE SERPL-SCNC: 105 MMOL/L (ref 95–110)
CO2 SERPL-SCNC: 25 MMOL/L (ref 23–29)
CREAT SERPL-MCNC: 0.6 MG/DL (ref 0.5–1.4)
DIFFERENTIAL METHOD: ABNORMAL
EOSINOPHIL # BLD AUTO: 0.2 K/UL (ref 0–0.5)
EOSINOPHIL NFR BLD: 1.2 % (ref 0–8)
ERYTHROCYTE [DISTWIDTH] IN BLOOD BY AUTOMATED COUNT: 12.8 % (ref 11.5–14.5)
EST. GFR  (NO RACE VARIABLE): >60 ML/MIN/1.73 M^2
GLUCOSE SERPL-MCNC: 77 MG/DL (ref 70–110)
HCT VFR BLD AUTO: 42.4 % (ref 37–48.5)
HGB BLD-MCNC: 13.5 G/DL (ref 12–16)
IMM GRANULOCYTES # BLD AUTO: 0.05 K/UL (ref 0–0.04)
IMM GRANULOCYTES NFR BLD AUTO: 0.4 % (ref 0–0.5)
LIPASE SERPL-CCNC: 603 U/L (ref 4–60)
LYMPHOCYTES # BLD AUTO: 2.6 K/UL (ref 1–4.8)
LYMPHOCYTES NFR BLD: 20.6 % (ref 18–48)
MCH RBC QN AUTO: 29.6 PG (ref 27–31)
MCHC RBC AUTO-ENTMCNC: 31.8 G/DL (ref 32–36)
MCV RBC AUTO: 93 FL (ref 82–98)
MONOCYTES # BLD AUTO: 0.7 K/UL (ref 0.3–1)
MONOCYTES NFR BLD: 5.4 % (ref 4–15)
N GONORRHOEA DNA SPEC QL NAA+PROBE: NOT DETECTED
NEUTROPHILS # BLD AUTO: 9 K/UL (ref 1.8–7.7)
NEUTROPHILS NFR BLD: 72.2 % (ref 38–73)
NRBC BLD-RTO: 0 /100 WBC
PLATELET # BLD AUTO: 320 K/UL (ref 150–450)
PMV BLD AUTO: 9.8 FL (ref 9.2–12.9)
POTASSIUM SERPL-SCNC: 4.4 MMOL/L (ref 3.5–5.1)
PROT SERPL-MCNC: 7.1 G/DL (ref 6–8.4)
RBC # BLD AUTO: 4.56 M/UL (ref 4–5.4)
SODIUM SERPL-SCNC: 139 MMOL/L (ref 136–145)
WBC # BLD AUTO: 12.5 K/UL (ref 3.9–12.7)

## 2023-01-05 PROCEDURE — 99223 PR INITIAL HOSPITAL CARE,LEVL III: ICD-10-PCS | Mod: ,,, | Performed by: OBSTETRICS & GYNECOLOGY

## 2023-01-05 PROCEDURE — 63600175 PHARM REV CODE 636 W HCPCS: Performed by: NURSE ANESTHETIST, CERTIFIED REGISTERED

## 2023-01-05 PROCEDURE — 83690 ASSAY OF LIPASE: CPT | Performed by: SURGERY

## 2023-01-05 PROCEDURE — 99223 1ST HOSP IP/OBS HIGH 75: CPT | Mod: 57,,, | Performed by: SURGERY

## 2023-01-05 PROCEDURE — 36415 COLL VENOUS BLD VENIPUNCTURE: CPT | Performed by: SURGERY

## 2023-01-05 PROCEDURE — 88304 TISSUE EXAM BY PATHOLOGIST: CPT | Performed by: PATHOLOGY

## 2023-01-05 PROCEDURE — 25000003 PHARM REV CODE 250: Performed by: SURGERY

## 2023-01-05 PROCEDURE — 85025 COMPLETE CBC W/AUTO DIFF WBC: CPT | Performed by: SURGERY

## 2023-01-05 PROCEDURE — 80053 COMPREHEN METABOLIC PANEL: CPT | Performed by: SURGERY

## 2023-01-05 PROCEDURE — 11000001 HC ACUTE MED/SURG PRIVATE ROOM

## 2023-01-05 PROCEDURE — 25000003 PHARM REV CODE 250: Performed by: ANESTHESIOLOGY

## 2023-01-05 PROCEDURE — 36000711: Performed by: SURGERY

## 2023-01-05 PROCEDURE — 88304 TISSUE EXAM BY PATHOLOGIST: CPT | Mod: 26,,, | Performed by: PATHOLOGY

## 2023-01-05 PROCEDURE — 36000710: Performed by: SURGERY

## 2023-01-05 PROCEDURE — 99223 1ST HOSP IP/OBS HIGH 75: CPT | Mod: ,,, | Performed by: OBSTETRICS & GYNECOLOGY

## 2023-01-05 PROCEDURE — 37000008 HC ANESTHESIA 1ST 15 MINUTES: Performed by: SURGERY

## 2023-01-05 PROCEDURE — 63600175 PHARM REV CODE 636 W HCPCS: Performed by: ANESTHESIOLOGY

## 2023-01-05 PROCEDURE — 63600175 PHARM REV CODE 636 W HCPCS: Performed by: SURGERY

## 2023-01-05 PROCEDURE — 37000009 HC ANESTHESIA EA ADD 15 MINS: Performed by: SURGERY

## 2023-01-05 PROCEDURE — 25000003 PHARM REV CODE 250: Performed by: NURSE ANESTHETIST, CERTIFIED REGISTERED

## 2023-01-05 PROCEDURE — 27201423 OPTIME MED/SURG SUP & DEVICES STERILE SUPPLY: Performed by: SURGERY

## 2023-01-05 PROCEDURE — 99223 PR INITIAL HOSPITAL CARE,LEVL III: ICD-10-PCS | Mod: 57,,, | Performed by: SURGERY

## 2023-01-05 PROCEDURE — G0378 HOSPITAL OBSERVATION PER HR: HCPCS

## 2023-01-05 PROCEDURE — 47562 PR LAP,CHOLECYSTECTOMY: ICD-10-PCS | Mod: ,,, | Performed by: SURGERY

## 2023-01-05 PROCEDURE — 71000033 HC RECOVERY, INTIAL HOUR: Performed by: SURGERY

## 2023-01-05 PROCEDURE — 47562 LAPAROSCOPIC CHOLECYSTECTOMY: CPT | Mod: ,,, | Performed by: SURGERY

## 2023-01-05 PROCEDURE — 88304 PR  SURG PATH,LEVEL III: ICD-10-PCS | Mod: 26,,, | Performed by: PATHOLOGY

## 2023-01-05 RX ORDER — PHENYLEPHRINE HYDROCHLORIDE 10 MG/ML
INJECTION INTRAVENOUS
Status: DISCONTINUED | OUTPATIENT
Start: 2023-01-05 | End: 2023-01-05

## 2023-01-05 RX ORDER — LIDOCAINE HYDROCHLORIDE 20 MG/ML
INJECTION, SOLUTION EPIDURAL; INFILTRATION; INTRACAUDAL; PERINEURAL
Status: DISCONTINUED | OUTPATIENT
Start: 2023-01-05 | End: 2023-01-05

## 2023-01-05 RX ORDER — HYDROMORPHONE HYDROCHLORIDE 2 MG/ML
0.2 INJECTION, SOLUTION INTRAMUSCULAR; INTRAVENOUS; SUBCUTANEOUS EVERY 5 MIN PRN
Status: DISCONTINUED | OUTPATIENT
Start: 2023-01-05 | End: 2023-01-06

## 2023-01-05 RX ORDER — SUCCINYLCHOLINE CHLORIDE 20 MG/ML
INJECTION INTRAMUSCULAR; INTRAVENOUS
Status: DISCONTINUED | OUTPATIENT
Start: 2023-01-05 | End: 2023-01-05

## 2023-01-05 RX ORDER — INDOCYANINE GREEN AND WATER 25 MG
KIT INJECTION
Status: DISCONTINUED | OUTPATIENT
Start: 2023-01-05 | End: 2023-01-05

## 2023-01-05 RX ORDER — SODIUM CHLORIDE 9 MG/ML
INJECTION, SOLUTION INTRAVENOUS CONTINUOUS
Status: DISCONTINUED | OUTPATIENT
Start: 2023-01-05 | End: 2023-01-05

## 2023-01-05 RX ORDER — PROPOFOL 10 MG/ML
VIAL (ML) INTRAVENOUS
Status: DISCONTINUED | OUTPATIENT
Start: 2023-01-05 | End: 2023-01-05

## 2023-01-05 RX ORDER — DEXAMETHASONE SODIUM PHOSPHATE 4 MG/ML
INJECTION, SOLUTION INTRA-ARTICULAR; INTRALESIONAL; INTRAMUSCULAR; INTRAVENOUS; SOFT TISSUE
Status: DISCONTINUED | OUTPATIENT
Start: 2023-01-05 | End: 2023-01-05

## 2023-01-05 RX ORDER — FENTANYL CITRATE 50 UG/ML
INJECTION, SOLUTION INTRAMUSCULAR; INTRAVENOUS
Status: DISCONTINUED | OUTPATIENT
Start: 2023-01-05 | End: 2023-01-05

## 2023-01-05 RX ORDER — ONDANSETRON 2 MG/ML
INJECTION INTRAMUSCULAR; INTRAVENOUS
Status: DISCONTINUED | OUTPATIENT
Start: 2023-01-05 | End: 2023-01-05

## 2023-01-05 RX ORDER — OXYCODONE HYDROCHLORIDE 5 MG/1
5 TABLET ORAL EVERY 6 HOURS PRN
Status: DISCONTINUED | OUTPATIENT
Start: 2023-01-05 | End: 2023-01-06 | Stop reason: HOSPADM

## 2023-01-05 RX ORDER — BUPIVACAINE HYDROCHLORIDE 2.5 MG/ML
INJECTION, SOLUTION EPIDURAL; INFILTRATION; INTRACAUDAL
Status: DISCONTINUED | OUTPATIENT
Start: 2023-01-05 | End: 2023-01-05 | Stop reason: HOSPADM

## 2023-01-05 RX ORDER — DOCUSATE SODIUM 100 MG/1
100 CAPSULE, LIQUID FILLED ORAL 2 TIMES DAILY
Status: DISCONTINUED | OUTPATIENT
Start: 2023-01-05 | End: 2023-01-06 | Stop reason: HOSPADM

## 2023-01-05 RX ORDER — NEOSTIGMINE METHYLSULFATE 1 MG/ML
INJECTION, SOLUTION INTRAVENOUS
Status: DISCONTINUED | OUTPATIENT
Start: 2023-01-05 | End: 2023-01-05

## 2023-01-05 RX ORDER — ONDANSETRON 2 MG/ML
4 INJECTION INTRAMUSCULAR; INTRAVENOUS DAILY PRN
Status: DISCONTINUED | OUTPATIENT
Start: 2023-01-05 | End: 2023-01-06

## 2023-01-05 RX ORDER — ACETAMINOPHEN 325 MG/1
650 TABLET ORAL EVERY 6 HOURS
Status: DISCONTINUED | OUTPATIENT
Start: 2023-01-06 | End: 2023-01-06 | Stop reason: HOSPADM

## 2023-01-05 RX ORDER — OXYCODONE AND ACETAMINOPHEN 5; 325 MG/1; MG/1
1 TABLET ORAL
Status: DISCONTINUED | OUTPATIENT
Start: 2023-01-05 | End: 2023-01-06

## 2023-01-05 RX ORDER — SODIUM CHLORIDE 9 MG/ML
INJECTION, SOLUTION INTRAVENOUS CONTINUOUS
Status: DISCONTINUED | OUTPATIENT
Start: 2023-01-05 | End: 2023-01-06 | Stop reason: HOSPADM

## 2023-01-05 RX ORDER — ROCURONIUM BROMIDE 10 MG/ML
INJECTION, SOLUTION INTRAVENOUS
Status: DISCONTINUED | OUTPATIENT
Start: 2023-01-05 | End: 2023-01-05

## 2023-01-05 RX ADMIN — ROCURONIUM BROMIDE 10 MG: 10 INJECTION, SOLUTION INTRAVENOUS at 07:01

## 2023-01-05 RX ADMIN — PHENYLEPHRINE HYDROCHLORIDE 200 MCG: 10 INJECTION INTRAVENOUS at 07:01

## 2023-01-05 RX ADMIN — FAMOTIDINE 20 MG: 20 TABLET ORAL at 08:01

## 2023-01-05 RX ADMIN — SODIUM CHLORIDE: 9 INJECTION, SOLUTION INTRAVENOUS at 10:01

## 2023-01-05 RX ADMIN — NEOSTIGMINE METHYLSULFATE 5 MG: 1 INJECTION INTRAVENOUS at 07:01

## 2023-01-05 RX ADMIN — HYDROCODONE BITARTRATE AND ACETAMINOPHEN 1 TABLET: 7.5; 325 TABLET ORAL at 03:01

## 2023-01-05 RX ADMIN — GLYCOPYRROLATE 0.8 MG: 0.2 INJECTION, SOLUTION INTRAMUSCULAR; INTRAVENOUS at 07:01

## 2023-01-05 RX ADMIN — INDOCYANINE GREEN 2.5 MG: KIT INTRAVENOUS at 06:01

## 2023-01-05 RX ADMIN — ROCURONIUM BROMIDE 5 MG: 10 INJECTION, SOLUTION INTRAVENOUS at 06:01

## 2023-01-05 RX ADMIN — DOCUSATE SODIUM 100 MG: 100 CAPSULE, LIQUID FILLED ORAL at 10:01

## 2023-01-05 RX ADMIN — SODIUM CHLORIDE, SODIUM LACTATE, POTASSIUM CHLORIDE, AND CALCIUM CHLORIDE: .6; .31; .03; .02 INJECTION, SOLUTION INTRAVENOUS at 08:01

## 2023-01-05 RX ADMIN — HYDROCODONE BITARTRATE AND ACETAMINOPHEN 1 TABLET: 5; 325 TABLET ORAL at 08:01

## 2023-01-05 RX ADMIN — ROCURONIUM BROMIDE 25 MG: 10 INJECTION, SOLUTION INTRAVENOUS at 07:01

## 2023-01-05 RX ADMIN — SODIUM CHLORIDE, POTASSIUM CHLORIDE, SODIUM LACTATE AND CALCIUM CHLORIDE: 600; 310; 30; 20 INJECTION, SOLUTION INTRAVENOUS at 10:01

## 2023-01-05 RX ADMIN — PROPOFOL 100 MG: 10 INJECTION, EMULSION INTRAVENOUS at 06:01

## 2023-01-05 RX ADMIN — HYDROCODONE BITARTRATE AND ACETAMINOPHEN 1 TABLET: 7.5; 325 TABLET ORAL at 08:01

## 2023-01-05 RX ADMIN — HYDROMORPHONE HYDROCHLORIDE 0.2 MG: 2 INJECTION INTRAMUSCULAR; INTRAVENOUS; SUBCUTANEOUS at 08:01

## 2023-01-05 RX ADMIN — SUCCINYLCHOLINE CHLORIDE 120 MG: 20 INJECTION, SOLUTION INTRAMUSCULAR; INTRAVENOUS at 06:01

## 2023-01-05 RX ADMIN — ONDANSETRON 4 MG: 2 INJECTION, SOLUTION INTRAMUSCULAR; INTRAVENOUS at 08:01

## 2023-01-05 RX ADMIN — FENTANYL CITRATE 50 MCG: 0.05 INJECTION, SOLUTION INTRAMUSCULAR; INTRAVENOUS at 06:01

## 2023-01-05 RX ADMIN — ACETAMINOPHEN 650 MG: 325 TABLET ORAL at 11:01

## 2023-01-05 RX ADMIN — LIDOCAINE HYDROCHLORIDE 550 MG: 20 INJECTION, SOLUTION EPIDURAL; INFILTRATION; INTRACAUDAL; PERINEURAL at 06:01

## 2023-01-05 RX ADMIN — SODIUM CHLORIDE, SODIUM LACTATE, POTASSIUM CHLORIDE, AND CALCIUM CHLORIDE: .6; .31; .03; .02 INJECTION, SOLUTION INTRAVENOUS at 06:01

## 2023-01-05 RX ADMIN — PIPERACILLIN SODIUM AND TAZOBACTAM SODIUM 4.5 G: 4; .5 INJECTION, POWDER, LYOPHILIZED, FOR SOLUTION INTRAVENOUS at 12:01

## 2023-01-05 RX ADMIN — ONDANSETRON 4 MG: 2 INJECTION INTRAMUSCULAR; INTRAVENOUS at 03:01

## 2023-01-05 RX ADMIN — FAMOTIDINE 20 MG: 20 TABLET ORAL at 10:01

## 2023-01-05 RX ADMIN — SODIUM CHLORIDE: 9 INJECTION, SOLUTION INTRAVENOUS at 12:01

## 2023-01-05 RX ADMIN — ONDANSETRON 4 MG: 2 INJECTION INTRAMUSCULAR; INTRAVENOUS at 08:01

## 2023-01-05 RX ADMIN — PROMETHAZINE HYDROCHLORIDE 12.5 MG: 25 INJECTION INTRAMUSCULAR; INTRAVENOUS at 10:01

## 2023-01-05 RX ADMIN — DEXAMETHASONE SODIUM PHOSPHATE 4 MG: 4 INJECTION, SOLUTION INTRAMUSCULAR; INTRAVENOUS at 08:01

## 2023-01-05 RX ADMIN — FENTANYL CITRATE 50 MCG: 0.05 INJECTION, SOLUTION INTRAMUSCULAR; INTRAVENOUS at 08:01

## 2023-01-05 NOTE — ANESTHESIA PREPROCEDURE EVALUATION
01/05/2023  Padmini Mahoney Robert Wren is a 27 y.o., female.      Pre-op Assessment    I have reviewed the Patient Summary Reports.    I have reviewed the NPO Status.   I have reviewed the Medications.     Review of Systems  Anesthesia Hx:  No problems with previous Anesthesia    Social:  Non-Smoker    Hematology/Oncology:  Hematology Normal        Cardiovascular:  Cardiovascular Normal     Pulmonary:   Asthma mild    Renal/:  Renal/ Normal     Hepatic/GI:  Hepatic/GI Normal    OB/GYN/PEDS:  IUP currently  7w 6d based crown-rump length on OLOL ultrasound   Neurological:   Seizures, well controlled No seizures in years   Endocrine:  Endocrine Normal  Obesity / BMI > 30  Psych:   anxiety          Physical Exam  General: Well nourished    Airway:  Mallampati: II   Mouth Opening: Small, but > 3cm  TM Distance: Normal  Neck ROM: Normal ROM    Dental:  Intact        Anesthesia Plan  Type of Anesthesia, risks & benefits discussed:    Anesthesia Type: Gen ETT  Intra-op Monitoring Plan: Standard ASA Monitors  Post Op Pain Control Plan: multimodal analgesia  Induction:  IV and rapid sequence  Airway Plan: , Post-Induction  Informed Consent: Informed consent signed with the Patient and all parties understand the risks and agree with anesthesia plan.  All questions answered.   ASA Score: 2  Anesthesia Plan Notes: Pt has been nauseated with some vomiting today.    Ready For Surgery From Anesthesia Perspective.     .      Chemistry        Component Value Date/Time     01/05/2023 0804    K 4.4 01/05/2023 0804     01/05/2023 0804    CO2 25 01/05/2023 0804    BUN 7 01/05/2023 0804    CREATININE 0.6 01/05/2023 0804    GLU 77 01/05/2023 0804        Component Value Date/Time    CALCIUM 9.3 01/05/2023 0804    ALKPHOS 156 (H) 01/05/2023 0804     (H) 01/05/2023 0804     (H) 01/05/2023 0804     BILITOT 0.8 01/05/2023 0804    ESTGFRAFRICA >60 07/11/2017 1616    EGFRNONAA >60 07/11/2017 1616        Lab Results   Component Value Date    WBC 12.50 01/05/2023    HGB 13.5 01/05/2023    HCT 42.4 01/05/2023    MCV 93 01/05/2023     01/05/2023

## 2023-01-05 NOTE — SUBJECTIVE & OBJECTIVE
Obstetric HPI:  Patient reports no OB complaints. Only pain from GB    OB History    Para Term  AB Living   4 3 3 0 0 3   SAB IAB Ectopic Multiple Live Births   0 0 0 0 3      # Outcome Date GA Lbr Paulino/2nd Weight Sex Delivery Anes PTL Lv   4 Current            3 Term 10/21/19 38w6d  3.02 kg (6 lb 10.5 oz) F Vag-Spont None N JOJO      Name: AUBREY YEAGER      Apgar1: 7  Apgar5: 9   2 Term 18 39w6d / 00:30 3.48 kg (7 lb 10.8 oz) M Vag-Spont Local N JOJO      Name: ERASMO YEAGER      Apgar1: 8  Apgar5: 9   1 Term 16 39w0d  3.6 kg (7 lb 15 oz) M Vag-Spont None  JOJO      Birth Comments: NCB at [facility]     Past Medical History:   Diagnosis Date    Anxiety     Arthritis     Asthma     has not used inhalers in years    JRA (juvenile rheumatoid arthritis)     Seizure     not on medications. has not had one since childhood    Seizures      Past Surgical History:   Procedure Laterality Date    ANKLE FRACTURE SURGERY      CYST REMOVAL      tubes ears         PTA Medications   Medication Sig    famotidine (PEPCID) 20 MG tablet Take 20 mg by mouth 2 (two) times daily.    ondansetron (ZOFRAN-ODT) 4 MG TbDL Take 4 mg by mouth every 6 (six) hours as needed.       Review of patient's allergies indicates:  No Known Allergies     Family History       Problem Relation (Age of Onset)    Asthma Mother, Brother, Brother    Inflammatory bowel disease Mother          Tobacco Use    Smoking status: Never    Smokeless tobacco: Never   Substance and Sexual Activity    Alcohol use: Not Currently    Drug use: No    Sexual activity: Yes     Partners: Male     Birth control/protection: None     Review of Systems   Gastrointestinal:  Positive for abdominal pain, nausea and vomiting.   Genitourinary: Negative.    All other systems reviewed and are negative.   Objective:     Vital Signs (Most Recent):  Temp: 97.8 °F (36.6 °C) (23)  Pulse: 67 (23)  Resp: 15 (23)  BP:  (!) 110/56 (01/05/23 0757)  SpO2: 100 % (01/05/23 0757)   Vital Signs (24h Range):  Temp:  [97.8 °F (36.6 °C)-98.3 °F (36.8 °C)] 97.8 °F (36.6 °C)  Pulse:  [57-96] 67  Resp:  [15-20] 15  SpO2:  [96 %-100 %] 100 %  BP: (101-149)/(54-85) 110/56     Weight: 105 kg (231 lb 7.7 oz)  Body mass index is 42.34 kg/m².    Physical Exam:   Constitutional: She is oriented to person, place, and time. She appears well-developed and well-nourished.   obese        Pulmonary/Chest: Effort normal.        Abdominal: Soft. She exhibits no distension. There is abdominal tenderness. There is no rebound and no guarding.     Genitourinary:    Genitourinary Comments: deferred             Musculoskeletal: Moves all extremeties.       Neurological: She is alert and oriented to person, place, and time.    Skin: Skin is warm and dry.    Psychiatric: She has a normal mood and affect. Her behavior is normal.       Significant Labs:  Lab Results   Component Value Date    GROUPTRH A POS 01/03/2023    HEPBSAG Non-reactive 01/03/2023    STREPBCULT  02/13/2018     STREPTOCOCCUS AGALACTIAE (GROUP B)  Beta-hemolytic streptococci are routinely susceptible to   penicillins,cephalosporins and carbapenems.         I have personallly reviewed all pertinent lab results from the last 24 hours.

## 2023-01-05 NOTE — ASSESSMENT & PLAN NOTE
Patient is a 7 week pregnancy.  Explained to her that surgery is considered safe in pregnancy.      I did discuss that there is a small risk of fetal loss.      Obstetrics consult

## 2023-01-05 NOTE — HOSPITAL COURSE
Pt admitted & scheduled for cholecystectomy today. Denies OB complaints-no cramping/spottting/bleeding. Pain only from gallbladder source. Pt counseled on low risk with surgery & anesthesia but aware she can wait until after pregnancy to proceed. Pt desires to proceed w/ surgery due to symptoms   Pt underwent uncomplicated robotic assisted lap cholecystectomy 1/5/23, and was placed in extended recovery.

## 2023-01-05 NOTE — SUBJECTIVE & OBJECTIVE
No current facility-administered medications on file prior to encounter.     Current Outpatient Medications on File Prior to Encounter   Medication Sig    famotidine (PEPCID) 20 MG tablet Take 20 mg by mouth 2 (two) times daily.    ondansetron (ZOFRAN-ODT) 4 MG TbDL Take 4 mg by mouth every 6 (six) hours as needed.       Review of patient's allergies indicates:  No Known Allergies    Past Medical History:   Diagnosis Date    Anxiety     Arthritis     Asthma     has not used inhalers in years    JRA (juvenile rheumatoid arthritis)     Seizure     not on medications. has not had one since childhood    Seizures      Past Surgical History:   Procedure Laterality Date    ANKLE FRACTURE SURGERY      CYST REMOVAL      tubes ears       Family History       Problem Relation (Age of Onset)    Asthma Mother, Brother, Brother    Inflammatory bowel disease Mother          Tobacco Use    Smoking status: Never    Smokeless tobacco: Never   Substance and Sexual Activity    Alcohol use: Not Currently    Drug use: No    Sexual activity: Yes     Partners: Male     Birth control/protection: None     Review of Systems   Constitutional:  Negative for appetite change, chills, fatigue, fever and unexpected weight change.   HENT:  Negative for hearing loss and rhinorrhea.    Eyes:  Negative for visual disturbance.   Respiratory:  Negative for apnea, cough, shortness of breath and wheezing.    Cardiovascular:  Negative for chest pain and palpitations.   Gastrointestinal:  Positive for abdominal pain and nausea. Negative for blood in stool, constipation, diarrhea and vomiting.   Genitourinary:  Negative for dysuria, frequency and urgency.   Musculoskeletal:  Negative for arthralgias and neck pain.   Skin:  Negative for rash.   Neurological:  Negative for seizures, weakness, numbness and headaches.   Hematological:  Negative for adenopathy. Does not bruise/bleed easily.   Psychiatric/Behavioral:  Negative for hallucinations. The patient is not  nervous/anxious.    Objective:     Vital Signs (Most Recent):  Temp: 97.8 °F (36.6 °C) (01/05/23 0757)  Pulse: 67 (01/05/23 0757)  Resp: 15 (01/05/23 0757)  BP: (!) 110/56 (01/05/23 0757)  SpO2: 100 % (01/05/23 0757)   Vital Signs (24h Range):  Temp:  [97.8 °F (36.6 °C)-98.3 °F (36.8 °C)] 97.8 °F (36.6 °C)  Pulse:  [57-96] 67  Resp:  [15-20] 15  SpO2:  [96 %-100 %] 100 %  BP: (101-149)/(54-85) 110/56     Weight: 105 kg (231 lb 7.7 oz)  Body mass index is 42.34 kg/m².    Physical Exam  Vitals and nursing note reviewed.   Constitutional:       Appearance: She is well-developed. She is obese.   HENT:      Head: Normocephalic.   Eyes:      Pupils: Pupils are equal, round, and reactive to light.   Neck:      Thyroid: No thyromegaly.      Vascular: No JVD.      Trachea: No tracheal deviation.   Cardiovascular:      Rate and Rhythm: Normal rate and regular rhythm.      Heart sounds: Normal heart sounds.   Pulmonary:      Breath sounds: Normal breath sounds. No wheezing.   Abdominal:      General: Bowel sounds are normal. There is no distension.      Palpations: Abdomen is soft. Abdomen is not rigid. There is no mass.      Tenderness: There is no abdominal tenderness (Right upper quadrant, mild Toribio sign). There is no guarding or rebound.      Comments: Obese   Musculoskeletal:         General: Normal range of motion.   Lymphadenopathy:      Cervical: No cervical adenopathy.   Skin:     General: Skin is warm and dry.      Findings: No erythema or rash.   Neurological:      General: No focal deficit present.      Mental Status: She is oriented to person, place, and time.   Psychiatric:         Mood and Affect: Mood normal.         Behavior: Behavior normal.         Thought Content: Thought content normal.         Judgment: Judgment normal.       Significant Labs:  I have reviewed all pertinent lab results within the past 24 hours.  CBC:   Recent Labs   Lab 01/04/23  1504   WBC 10.34   RBC 4.40   HGB 13.0   HCT 40.7   PLT  294   MCV 93   MCH 29.5   MCHC 31.9*     CMP:   Recent Labs   Lab 01/04/23  1504      CALCIUM 9.3   ALBUMIN 3.3*   PROT 7.3      K 4.1   CO2 24      BUN 7   CREATININE 0.6   ALKPHOS 106   *   *   BILITOT 0.3       Significant Diagnostics:  I have reviewed all pertinent imaging results/findings within the past 24 hours.  U/S: I have reviewed all pertinent results/findings within the past 24 hours and my personal findings are:       Reading Physician Reading Date Result Priority   Ramirez Bynum MD  [phone] 1/4/2023 STAT     Narrative & Impression  EXAMINATION:  US ABDOMEN LIMITED     CLINICAL HISTORY:  abdominal pain;     TECHNIQUE:  Limited ultrasound of the right upper quadrant of the abdomen (including pancreas, liver, gallbladder, common bile duct, and spleen) was performed.     COMPARISON:  None.     FINDINGS:  Liver: Normal in size, measuring 16.5 cm.Fatty infiltration of the liver.     Gallbladder: Contracted gallbladder with multiple.  Positive sonographic Toribio's sign.     Biliary system: The common duct is dilated, measuring 9 mm.  Mild intrahepatic ductal dilatation.     Right kidney: Normal in size and echotexture, measuring 12.3 cm.     Miscellaneous: No upper abdominal ascites.     Impression:     Contracted gallbladder with multiple gallstones.  Positive sonographic Toribio sign.  Dilated common bile duct measuring 9 mm.  Correlate clinically for cholecystitis     Fatty infiltration of the liver

## 2023-01-05 NOTE — H&P
[facility]  General Surgery  History & Physical    Patient Name: Padmini Wren  MRN: [id number]  Admission Date: 1/4/2023  Attending Physician: Luis Eduardo Santos MD   Primary Care Provider: Taryn Salcido MD    Patient information was obtained from patient, past medical records and ER records.     Subjective:     Chief Complaint/Reason for Admission:  Abdominal pain, acute cholecystitis   Seven week intrauterine pregnancy    History of Present Illness: 27-year-old female who is 7 weeks pregnant has been experiencing episodes of epigastric and right flank pain for gallstones for several years.  The pain is usually crampy achy.  It can be associated with nausea.  The pain usually resolves.      She developed an episode of pain that was slightly more severe.  It was more pressure to aching.  Was associated with nausea.  When it did resolve after several hours she presented to the emergency room.  She was evaluated and an ultrasound confirmed gallstones.  Liver function tests were normal.  She was admitted with early acute cholecystitis versus severe barely colic.  A Ob consult was requested due to the fact that she is going to require surgery in his 7 weeks pregnant.      This morning she still has some nausea and right abdominal/right flank pain      No current facility-administered medications on file prior to encounter.     Current Outpatient Medications on File Prior to Encounter   Medication Sig    famotidine (PEPCID) 20 MG tablet Take 20 mg by mouth 2 (two) times daily.    ondansetron (ZOFRAN-ODT) 4 MG TbDL Take 4 mg by mouth every 6 (six) hours as needed.       Review of patient's allergies indicates:  No Known Allergies    Past Medical History:   Diagnosis Date    Anxiety     Arthritis     Asthma     has not used inhalers in years    JRA (juvenile rheumatoid arthritis)     Seizure     not on medications. has not had one since childhood    Seizures      Past Surgical History:    Procedure Laterality Date    ANKLE FRACTURE SURGERY      CYST REMOVAL      tubes ears       Family History       Problem Relation (Age of Onset)    Asthma Mother, Brother, Brother    Inflammatory bowel disease Mother          Tobacco Use    Smoking status: Never    Smokeless tobacco: Never   Substance and Sexual Activity    Alcohol use: Not Currently    Drug use: No    Sexual activity: Yes     Partners: Male     Birth control/protection: None     Review of Systems   Constitutional:  Negative for appetite change, chills, fatigue, fever and unexpected weight change.   HENT:  Negative for hearing loss and rhinorrhea.    Eyes:  Negative for visual disturbance.   Respiratory:  Negative for apnea, cough, shortness of breath and wheezing.    Cardiovascular:  Negative for chest pain and palpitations.   Gastrointestinal:  Positive for abdominal pain and nausea. Negative for blood in stool, constipation, diarrhea and vomiting.   Genitourinary:  Negative for dysuria, frequency and urgency.   Musculoskeletal:  Negative for arthralgias and neck pain.   Skin:  Negative for rash.   Neurological:  Negative for seizures, weakness, numbness and headaches.   Hematological:  Negative for adenopathy. Does not bruise/bleed easily.   Psychiatric/Behavioral:  Negative for hallucinations. The patient is not nervous/anxious.    Objective:     Vital Signs (Most Recent):  Temp: 97.8 °F (36.6 °C) (01/05/23 0757)  Pulse: 67 (01/05/23 0757)  Resp: 15 (01/05/23 0757)  BP: (!) 110/56 (01/05/23 0757)  SpO2: 100 % (01/05/23 0757)   Vital Signs (24h Range):  Temp:  [97.8 °F (36.6 °C)-98.3 °F (36.8 °C)] 97.8 °F (36.6 °C)  Pulse:  [57-96] 67  Resp:  [15-20] 15  SpO2:  [96 %-100 %] 100 %  BP: (101-149)/(54-85) 110/56     Weight: 105 kg (231 lb 7.7 oz)  Body mass index is 42.34 kg/m².    Physical Exam  Vitals and nursing note reviewed.   Constitutional:       Appearance: She is well-developed. She is obese.   HENT:      Head: Normocephalic.    Eyes:      Pupils: Pupils are equal, round, and reactive to light.   Neck:      Thyroid: No thyromegaly.      Vascular: No JVD.      Trachea: No tracheal deviation.   Cardiovascular:      Rate and Rhythm: Normal rate and regular rhythm.      Heart sounds: Normal heart sounds.   Pulmonary:      Breath sounds: Normal breath sounds. No wheezing.   Abdominal:      General: Bowel sounds are normal. There is no distension.      Palpations: Abdomen is soft. Abdomen is not rigid. There is no mass.      Tenderness: There is no abdominal tenderness (Right upper quadrant, mild Toribio sign). There is no guarding or rebound.      Comments: Obese   Musculoskeletal:         General: Normal range of motion.   Lymphadenopathy:      Cervical: No cervical adenopathy.   Skin:     General: Skin is warm and dry.      Findings: No erythema or rash.   Neurological:      General: No focal deficit present.      Mental Status: She is oriented to person, place, and time.   Psychiatric:         Mood and Affect: Mood normal.         Behavior: Behavior normal.         Thought Content: Thought content normal.         Judgment: Judgment normal.       Significant Labs:  I have reviewed all pertinent lab results within the past 24 hours.  CBC:   Recent Labs   Lab 01/04/23  1504   WBC 10.34   RBC 4.40   HGB 13.0   HCT 40.7      MCV 93   MCH 29.5   MCHC 31.9*     CMP:   Recent Labs   Lab 01/04/23  1504      CALCIUM 9.3   ALBUMIN 3.3*   PROT 7.3      K 4.1   CO2 24      BUN 7   CREATININE 0.6   ALKPHOS 106   *   *   BILITOT 0.3       Significant Diagnostics:  I have reviewed all pertinent imaging results/findings within the past 24 hours.  U/S: I have reviewed all pertinent results/findings within the past 24 hours and my personal findings are:       Reading Physician Reading Date Result Priority   Ramirez Bynum MD  [phone] 1/4/2023 STAT     Narrative & Impression  EXAMINATION:  US ABDOMEN LIMITED      CLINICAL HISTORY:  abdominal pain;     TECHNIQUE:  Limited ultrasound of the right upper quadrant of the abdomen (including pancreas, liver, gallbladder, common bile duct, and spleen) was performed.     COMPARISON:  None.     FINDINGS:  Liver: Normal in size, measuring 16.5 cm.Fatty infiltration of the liver.     Gallbladder: Contracted gallbladder with multiple.  Positive sonographic Toribio's sign.     Biliary system: The common duct is dilated, measuring 9 mm.  Mild intrahepatic ductal dilatation.     Right kidney: Normal in size and echotexture, measuring 12.3 cm.     Miscellaneous: No upper abdominal ascites.     Impression:     Contracted gallbladder with multiple gallstones.  Positive sonographic Toribio sign.  Dilated common bile duct measuring 9 mm.  Correlate clinically for cholecystitis     Fatty infiltration of the liver    Assessment/Plan:     Pregnant  Patient is a 7 week pregnancy.  Explained to her that surgery is considered safe in pregnancy.      I did discuss that there is a small risk of fetal loss.      Obstetrics consult    Cholelithiasis with acute cholecystitis  Patient has severe very colic versus acute cholecystitis.  Admission to observation  Robotic cholecystectomy.      Need for cholecystectomy was discussed with her.  I did discuss that surgery is considered safe during pregnancy.  We will get an OB gyn consult.      Risks benefits and complications of surgery were discussed.  I explained that surgery would be done by 1 of my partners    The risks, benefits and complications of robotic laparoscopic cholecystectomy were discussed.  These included infection, bleeding, retained stone, abscess and bile leak.  The need for open conversion was dicussed.  The rare possibility of a common bile duct injury and the need for additional procedures and/or surgery was reviewed.  All question were answered.  The patient has agreed to proceed.  Consent was obtained.        BMI 40.0-44.9, adult  Patient  should discuss weight loss with her primary provider    Seizure disorder in pregnancy, first trimester  Monitor      VTE Risk Mitigation (From admission, onward)         Ordered     IP VTE HIGH RISK PATIENT  Once         01/04/23 2144     Place sequential compression device  Until discontinued         01/04/23 2144                Luis Eduardo Santos MD  General Surgery  '[facility] S[facility]

## 2023-01-05 NOTE — PLAN OF CARE
Discussed poc with pt, pt verbalized understanding    Purposeful rounding every 2hours    VS wnl    Fall precautions in place, remains injury free    Pain and nausea under control with PRN meds    IVFs  Accurate I&Os  Abx given as prescribed  Bed locked at lowest position  Call light within reach    Chart check complete  Will cont with POC     OB US done  Awaiting MRCP then will have poss ERCP vs Lap kendy

## 2023-01-05 NOTE — NURSING
Patient left to see her grandmother on telemetry, ambulating without assistance, accompanied by her mother

## 2023-01-05 NOTE — PHARMACY MED REC
"Admission Medication History     The home medication history was taken by Neftali Ayala.    You may go to "Admission" then "Reconcile Home Medications" tabs to review and/or act upon these items.     The home medication list has been updated by the Pharmacy department.   Please read ALL comments highlighted in yellow.   Please address this information as you see fit.    Feel free to contact us if you have any questions or require assistance.      Medications listed below were obtained from: Analytic software- [employer] and Medical records  (Not in a hospital admission)        Neftali Ayala  [id number]      Current Outpatient Medications on File Prior to Encounter   Medication Sig Dispense Refill Last Dose    famotidine (PEPCID) 20 MG tablet Take 20 mg by mouth 2 (two) times daily.       ondansetron (ZOFRAN-ODT) 4 MG TbDL Take 4 mg by mouth every 6 (six) hours as needed.                              .        " Medication Therapy Management (MTM) Encounter    ASSESSMENT:                            Medication Adherence/Access: No issues identified    S/p Hip Replacement: Recovering. Discussed monitoring weight and fluid retention as this may be a side effect from the pioglitazone dose increase or the hip surgery itself.     Type 2 Diabetes/Obesity: Patient is not meeting A1c goal of < 7%. Patient is not meeting average glucose goal of <150 mg/dL. Patient is not meeting goal of >70% time in target with continuous glucose monitoring. Will continue monitoring blood sugars and weight as patient recovers from hip surgery.     PLAN:                            1. Continue current medications. Please monitor your weight and fluid retention as you recover from hip surgery. This can be a possible side effect of pioglitazone as well.    Follow-up: Return in 1 month (on 3/23/2022) for Medication Therapy Management.    SUBJECTIVE/OBJECTIVE:                          Celestine Simon is a 65 year old male contacted via secure video for a follow-up visit.  Today's visit is a follow-up MTM visit from 1/19/22.     Reason for visit: Recheck blood sugars.    Allergies/ADRs: Reviewed in chart  Past Medical History: Reviewed in chart  Tobacco: He reports that he has quit smoking. He has never used smokeless tobacco.  Alcohol: not currently using    Medication Adherence/Access: no issues reported    S/p Hip Replacement: Patient had right hip arthroplasty on 2/8/22. Reports his blood sugars ran high after surgery and are just beginning to come back down. Also endorses leg swelling which is improving - left leg back to normal and right leg coming down.    Type 2 Diabetes/Obesity: Currently taking metformin 1000mg twice daily and pioglitazone 30mg daily (dose increased at last MTM visit). Patient is not experiencing side effects.  Blood sugar monitoring: Continuous Glucose Monitor - Freestyle Natalia 14-day. Ranges (per LibreView): See below.    Symptoms  of low blood sugar? none  Symptoms of high blood sugar? none  Eye exam: up to date  Foot exam: due  Diet/Exercise: Weight was down to 249 lbs, now up to 262 lbs. Patient feels like it's mostly fluid retention after hip surgery.  Aspirin: Taking 81mg daily and denies side effects  Statin: Yes: atorvastatin.   ACEi/ARB: Yes: lisinopril.   Urine Albumin:   Lab Results   Component Value Date    UMALCR 27.69 (H) 11/16/2021      Lab Results   Component Value Date    A1C 7.6 02/03/2022    A1C 10.3 11/16/2021    A1C 7.6 12/24/2018    A1C 8.6 08/07/2018    A1C 7.9 02/07/2018    A1C 9.1 11/07/2017    A1C 8.4 08/04/2017     Today's Vitals: There were no vitals taken for this visit.     ----------------    I spent 15 minutes with this patient today. All changes were made via collaborative practice agreement with Deborah Delgado PA-C. A copy of the visit note was provided to the patient's provider(s).    The patient was sent via [employer]ora a summary of these recommendations.     Jade Jasmine, PharmD, BCACP  Medication Therapy Management Pharmacist  Pager: [phone]    Telemedicine Visit Details  Type of service:  Video Conference via Jober[employer]  Start Time: 1400  End Time: 1415  Originating Location (patient location): Home  Distant Location (provider location):  [facility]     Medication Therapy Recommendations  No medication therapy recommendations to display

## 2023-01-05 NOTE — PROGRESS NOTES
Patient seen in preop with her family. She is reporting worsening pain and nausea. Ultrasound tech currently performing OB ultrasound so I asked if they could measure the CBD diameter again which is still at 9 mm. Liver enzymes and bilirubin increased today. I believe choledocholithiasis needs to be ruled out first. Will obtain MRCP. I spoke with the MRI department and the earliest available time is 5 PM tonight, so will have to wait. If MRCP is negative for choledocholithiasis, then will perform cholecystectomy tonight. If positive, then will consult GI for ERCP.    Patient and her family expressed understanding and are in agreement.    Nuzhat Henry, DO  General Surgery  [facility]  1/5/2023

## 2023-01-05 NOTE — HPI
27-year-old female who is 7 weeks pregnant has been experiencing episodes of epigastric and right flank pain for gallstones for several years.  The pain is usually crampy achy.  It can be associated with nausea.  The pain usually resolves.      She developed an episode of pain that was slightly more severe.  It was more pressure to aching.  Was associated with nausea.  When it did resolve after several hours she presented to the emergency room.  She was evaluated and an ultrasound confirmed gallstones.  Liver function tests were normal.  She was admitted with early acute cholecystitis versus severe barely colic.  A Ob consult was requested due to the fact that she is going to require surgery in his 7 weeks pregnant.      This morning she still has some nausea and right abdominal/right flank pain

## 2023-01-05 NOTE — HPI
Padmini Wren 27 y.o.  with IUP 7w 4d based crown-rump length on OLOL ultrasound (please note discrepancy in HECTOR vs CRL in that report; images not available for review).

## 2023-01-05 NOTE — CONSULTS
[facility]  Obstetrics  Consult Note    Patient Name: Padmini Wren  MRN: [id number]  Admission Date: 2023  Hospital Length of Stay: 0 days  Code Status: Full Code  Primary Care Provider: Taryn Salcido MD  Principal Problem: Cholelithiasis with acute cholecystitis    Consults  Subjective:     Principal Problem:Cholelithiasis with acute cholecystitis    History of Present Illness:  Padmini Wren 27 y.o.  with IUP 7w 4d based crown-rump length on OLOL ultrasound (please note discrepancy in HECTOR vs CRL in that report; images not available for review).      Obstetric HPI:  Patient reports no OB complaints. Only pain from GB    OB History    Para Term  AB Living   4 3 3 0 0 3   SAB IAB Ectopic Multiple Live Births   0 0 0 0 3      # Outcome Date GA Lbr Paulino/2nd Weight Sex Delivery Anes PTL Lv   4 Current            3 Term 10/21/19 38w6d  3.02 kg (6 lb 10.5 oz) F Vag-Spont None N JOJO      Name: AUBREY YEAGER      Apgar1: 7  Apgar5: 9   2 Term 18 39w6d / 00:30 3.48 kg (7 lb 10.8 oz) M Vag-Spont Local N JOJO      Name: ERASMO YEAGER      Apgar1: 8  Apgar5: 9   1 Term / 39w0d  3.6 kg (7 lb 15 oz) M Vag-Spont None  JOJO      Birth Comments: [facility]     Past Medical History:   Diagnosis Date    Anxiety     Arthritis     Asthma     has not used inhalers in years    JRA (juvenile rheumatoid arthritis)     Seizure     not on medications. has not had one since childhood    Seizures      Past Surgical History:   Procedure Laterality Date    ANKLE FRACTURE SURGERY      CYST REMOVAL      tubes ears         PTA Medications   Medication Sig    famotidine (PEPCID) 20 MG tablet Take 20 mg by mouth 2 (two) times daily.    ondansetron (ZOFRAN-ODT) 4 MG TbDL Take 4 mg by mouth every 6 (six) hours as needed.       Review of patient's allergies indicates:  No Known Allergies     Family History       Problem  Relation (Age of Onset)    Asthma Mother, Brother, Brother    Inflammatory bowel disease Mother          Tobacco Use    Smoking status: Never    Smokeless tobacco: Never   Substance and Sexual Activity    Alcohol use: Not Currently    Drug use: No    Sexual activity: Yes     Partners: Male     Birth control/protection: None     Review of Systems   Gastrointestinal:  Positive for abdominal pain, nausea and vomiting.   Genitourinary: Negative.    All other systems reviewed and are negative.   Objective:     Vital Signs (Most Recent):  Temp: 97.8 °F (36.6 °C) (01/05/23 0757)  Pulse: 67 (01/05/23 0757)  Resp: 15 (01/05/23 0757)  BP: (!) 110/56 (01/05/23 0757)  SpO2: 100 % (01/05/23 0757)   Vital Signs (24h Range):  Temp:  [97.8 °F (36.6 °C)-98.3 °F (36.8 °C)] 97.8 °F (36.6 °C)  Pulse:  [57-96] 67  Resp:  [15-20] 15  SpO2:  [96 %-100 %] 100 %  BP: (101-149)/(54-85) 110/56     Weight: 105 kg (231 lb 7.7 oz)  Body mass index is 42.34 kg/m².    Physical Exam:   Constitutional: She is oriented to person, place, and time. She appears well-developed and well-nourished.   obese        Pulmonary/Chest: Effort normal.        Abdominal: Soft. She exhibits no distension. There is abdominal tenderness. There is no rebound and no guarding.     Genitourinary:    Genitourinary Comments: deferred             Musculoskeletal: Moves all extremeties.       Neurological: She is alert and oriented to person, place, and time.    Skin: Skin is warm and dry.    Psychiatric: She has a normal mood and affect. Her behavior is normal.       Significant Labs:  Lab Results   Component Value Date    GROUPTRH A POS 01/03/2023    HEPBSAG Non-reactive 01/03/2023    STREPBCULT  02/13/2018     STREPTOCOCCUS AGALACTIAE (GROUP B)  Beta-hemolytic streptococci are routinely susceptible to   penicillins,cephalosporins and carbapenems.         I have personallly reviewed all pertinent lab results from the last 24 hours.    Assessment/Plan:     27 y.o.  female  at Unknown for:    * Cholelithiasis with acute cholecystitis  Scheduled for cholecystectomy today. Pt has been counseled & desires surgical management    Pregnant  Discrepancy in OLOL ultrasound report  Ultrasound ordered to document viability & confirm HECTOR prior to surgery    Anxiety  No meds       BMI 40.0-44.9, adult          Seizure disorder in pregnancy, first trimester  No meds      Thank you for your consult. I will follow-up with patient. Please contact us if you have any additional questions.    Carrol Deras MD  Obstetrics & Gynecology  [facility]

## 2023-01-05 NOTE — ASSESSMENT & PLAN NOTE
Discrepancy in OLOL ultrasound report  Ultrasound ordered to document viability & confirm HECTOR prior to surgery

## 2023-01-05 NOTE — ASSESSMENT & PLAN NOTE
Patient has severe very colic versus acute cholecystitis.  Admission to observation  Robotic cholecystectomy.      Need for cholecystectomy was discussed with her.  I did discuss that surgery is considered safe during pregnancy.  We will get an OB gyn consult.      Risks benefits and complications of surgery were discussed.  I explained that surgery would be done by 1 of my partners    The risks, benefits and complications of robotic laparoscopic cholecystectomy were discussed.  These included infection, bleeding, retained stone, abscess and bile leak.  The need for open conversion was dicussed.  The rare possibility of a common bile duct injury and the need for additional procedures and/or surgery was reviewed.  All question were answered.  The patient has agreed to proceed.  Consent was obtained.

## 2023-01-06 VITALS
WEIGHT: 231.5 LBS | RESPIRATION RATE: 18 BRPM | DIASTOLIC BLOOD PRESSURE: 53 MMHG | SYSTOLIC BLOOD PRESSURE: 106 MMHG | BODY MASS INDEX: 42.6 KG/M2 | TEMPERATURE: 98 F | HEIGHT: 62 IN | OXYGEN SATURATION: 94 % | HEART RATE: 73 BPM

## 2023-01-06 LAB
ALBUMIN SERPL BCP-MCNC: 2.8 G/DL (ref 3.5–5.2)
ALP SERPL-CCNC: 139 U/L (ref 55–135)
ALT SERPL W/O P-5'-P-CCNC: 273 U/L (ref 10–44)
ANION GAP SERPL CALC-SCNC: 7 MMOL/L (ref 8–16)
AST SERPL-CCNC: 99 U/L (ref 10–40)
BACTERIA UR CULT: NO GROWTH
BASOPHILS # BLD AUTO: 0.01 K/UL (ref 0–0.2)
BASOPHILS NFR BLD: 0.1 % (ref 0–1.9)
BILIRUB SERPL-MCNC: 0.4 MG/DL (ref 0.1–1)
BUN SERPL-MCNC: 8 MG/DL (ref 6–20)
CALCIUM SERPL-MCNC: 9.1 MG/DL (ref 8.7–10.5)
CHLORIDE SERPL-SCNC: 109 MMOL/L (ref 95–110)
CO2 SERPL-SCNC: 20 MMOL/L (ref 23–29)
CREAT SERPL-MCNC: 0.7 MG/DL (ref 0.5–1.4)
DIFFERENTIAL METHOD: ABNORMAL
EOSINOPHIL # BLD AUTO: 0 K/UL (ref 0–0.5)
EOSINOPHIL NFR BLD: 0 % (ref 0–8)
ERYTHROCYTE [DISTWIDTH] IN BLOOD BY AUTOMATED COUNT: 12.4 % (ref 11.5–14.5)
EST. GFR  (NO RACE VARIABLE): >60 ML/MIN/1.73 M^2
GLUCOSE SERPL-MCNC: 122 MG/DL (ref 70–110)
HCT VFR BLD AUTO: 36.1 % (ref 37–48.5)
HGB BLD-MCNC: 11.5 G/DL (ref 12–16)
IMM GRANULOCYTES # BLD AUTO: 0.07 K/UL (ref 0–0.04)
IMM GRANULOCYTES NFR BLD AUTO: 0.5 % (ref 0–0.5)
LYMPHOCYTES # BLD AUTO: 1.1 K/UL (ref 1–4.8)
LYMPHOCYTES NFR BLD: 8 % (ref 18–48)
MCH RBC QN AUTO: 29.3 PG (ref 27–31)
MCHC RBC AUTO-ENTMCNC: 31.9 G/DL (ref 32–36)
MCV RBC AUTO: 92 FL (ref 82–98)
MONOCYTES # BLD AUTO: 0.2 K/UL (ref 0.3–1)
MONOCYTES NFR BLD: 1.4 % (ref 4–15)
NEUTROPHILS # BLD AUTO: 12 K/UL (ref 1.8–7.7)
NEUTROPHILS NFR BLD: 90 % (ref 38–73)
NRBC BLD-RTO: 0 /100 WBC
PLATELET # BLD AUTO: 260 K/UL (ref 150–450)
PMV BLD AUTO: 9.8 FL (ref 9.2–12.9)
POTASSIUM SERPL-SCNC: 4.6 MMOL/L (ref 3.5–5.1)
PROT SERPL-MCNC: 6.6 G/DL (ref 6–8.4)
RBC # BLD AUTO: 3.93 M/UL (ref 4–5.4)
SODIUM SERPL-SCNC: 136 MMOL/L (ref 136–145)
WBC # BLD AUTO: 13.31 K/UL (ref 3.9–12.7)

## 2023-01-06 PROCEDURE — 11000001 HC ACUTE MED/SURG PRIVATE ROOM

## 2023-01-06 PROCEDURE — 99900035 HC TECH TIME PER 15 MIN (STAT)

## 2023-01-06 PROCEDURE — 94799 UNLISTED PULMONARY SVC/PX: CPT

## 2023-01-06 PROCEDURE — 85025 COMPLETE CBC W/AUTO DIFF WBC: CPT | Performed by: SURGERY

## 2023-01-06 PROCEDURE — 63600175 PHARM REV CODE 636 W HCPCS: Performed by: ANESTHESIOLOGY

## 2023-01-06 PROCEDURE — 80053 COMPREHEN METABOLIC PANEL: CPT | Performed by: SURGERY

## 2023-01-06 PROCEDURE — 25000003 PHARM REV CODE 250: Performed by: SURGERY

## 2023-01-06 PROCEDURE — 99231 PR SUBSEQUENT HOSPITAL CARE,LEVL I: ICD-10-PCS | Mod: ,,, | Performed by: OBSTETRICS & GYNECOLOGY

## 2023-01-06 PROCEDURE — 99231 SBSQ HOSP IP/OBS SF/LOW 25: CPT | Mod: ,,, | Performed by: OBSTETRICS & GYNECOLOGY

## 2023-01-06 PROCEDURE — 94761 N-INVAS EAR/PLS OXIMETRY MLT: CPT

## 2023-01-06 PROCEDURE — 63600175 PHARM REV CODE 636 W HCPCS: Performed by: SURGERY

## 2023-01-06 PROCEDURE — 36415 COLL VENOUS BLD VENIPUNCTURE: CPT | Performed by: SURGERY

## 2023-01-06 RX ORDER — HYDROCODONE BITARTRATE AND ACETAMINOPHEN 7.5; 325 MG/1; MG/1
1 TABLET ORAL
Qty: 25 TABLET | Refills: 0 | Status: SHIPPED | OUTPATIENT
Start: 2023-01-06 | End: 2023-01-31

## 2023-01-06 RX ORDER — DOCUSATE SODIUM 100 MG/1
100 CAPSULE, LIQUID FILLED ORAL 2 TIMES DAILY
Qty: 60 CAPSULE | Refills: 1 | Status: SHIPPED | OUTPATIENT
Start: 2023-01-06 | End: 2023-01-31

## 2023-01-06 RX ADMIN — DOCUSATE SODIUM 100 MG: 100 CAPSULE, LIQUID FILLED ORAL at 08:01

## 2023-01-06 RX ADMIN — ACETAMINOPHEN 650 MG: 325 TABLET ORAL at 06:01

## 2023-01-06 RX ADMIN — ACETAMINOPHEN 650 MG: 325 TABLET ORAL at 11:01

## 2023-01-06 RX ADMIN — OXYCODONE HYDROCHLORIDE 5 MG: 5 TABLET ORAL at 08:01

## 2023-01-06 RX ADMIN — FAMOTIDINE 20 MG: 20 TABLET ORAL at 08:01

## 2023-01-06 RX ADMIN — HYDROMORPHONE HYDROCHLORIDE 1 MG: 2 INJECTION INTRAMUSCULAR; INTRAVENOUS; SUBCUTANEOUS at 02:01

## 2023-01-06 RX ADMIN — HYDROMORPHONE HYDROCHLORIDE 0.2 MG: 2 INJECTION INTRAMUSCULAR; INTRAVENOUS; SUBCUTANEOUS at 02:01

## 2023-01-06 NOTE — HOSPITAL COURSE
01/05/2022: MRCP negative, underwent robotic cholecystectomy, lipase elevated at 600    01/06/2022: POD 1 s/p cholecystectomy. Pain improved from pre-operatively, now most pain is at extraction site. LFTs and Tbili trending down. Tolerating a diet, VSS.

## 2023-01-06 NOTE — ANESTHESIA PROCEDURE NOTES
Intubation    Date/Time: 1/5/2023 6:50 PM  Performed by: Noah Reyes II, MD  Authorized by: Noah Reyes II, MD     Intubation:     Induction:  Rapid sequence induction    Intubated:  Postinduction    Mask Ventilation:  N/a    Attempts:  1    Attempted By:  Staff anesthesiologist    Method of Intubation:  Direct    Blade:  Tracey 2    Laryngeal View Grade: Grade I - full view of cords      Difficult Airway Encountered?: No      Complications:  None    Airway Device:  Oral endotracheal tube    Airway Device Size:  7.0    Style/Cuff Inflation:  Cuffed (inflated to minimal occlusive pressure)    Tube secured:  22    Secured at:  The lips    Placement Verified By:  Capnometry    Complicating Factors:  None    Findings Post-Intubation:  BS equal bilateral

## 2023-01-06 NOTE — PROGRESS NOTES
[facility]  Obstetrics  Antepartum Progress Note    Patient Name: Padmini Wren  MRN: [id number]  Admission Date: 2023  Hospital Length of Stay: 0 days  Attending Physician: Luis Eduardo Santos MD  Primary Care Provider: Taryn Salcido MD    Subjective:     Principal Problem:Cholelithiasis with acute cholecystitis    HPI:  Padmini Wren 27 y.o.  with IUP 7w 4d based crown-rump length on [facility] ultrasound (please note discrepancy in HECTOR vs CRL in that report; images not available for review).      Hospital Course:  Pt admitted & scheduled for cholecystectomy today. Denies OB complaints-no cramping/spottting/bleeding. Pain only from gallbladder source. Pt counseled on low risk with surgery & anesthesia but aware she can wait until after pregnancy to proceed. Pt desires to proceed w/ surgery due to symptoms   Pt underwent uncomplicated robotic assisted lap cholecystectomy 23, and was placed in extended recovery.      Obstetric HPI:  Patient denies any pelvic pain or vaginal bleeding.  Has some incisional pain in the RUQ well-controlled with pain meds.  Denies any N/V.     Objective:     Vital Signs (Most Recent):  Temp: 98.4 °F (36.9 °C) (23 1141)  Pulse: 73 (23 1141)  Resp: 18 (23 1141)  BP: (!) 106/53 (23 1141)  SpO2: (!) 94 % (23 1141)   Vital Signs (24h Range):  Temp:  [68 °F (20 °C)-99.7 °F (37.6 °C)] 98.4 °F (36.9 °C)  Pulse:  [67-85] 73  Resp:  [14-23] 18  SpO2:  [93 %-100 %] 94 %  BP: ()/(50-65) 106/53     Weight: 105 kg (231 lb 7.7 oz)  Body mass index is 42.34 kg/m².          Intake/Output Summary (Last 24 hours) at 2023 1227  Last data filed at 2023 1036  Gross per 24 hour   Intake 2464.91 ml   Output --   Net 2464.91 ml            Significant Labs:  Recent Lab Results         23  0551   23  1746        Albumin 2.8         Alkaline Phosphatase 139                  Anion Gap 7         AST  99         Baso # 0.01         Basophil % 0.1         BILIRUBIN TOTAL 0.4  Comment: For infants and newborns, interpretation of results should be based  on gestational age, weight and in agreement with clinical  observations.    Premature Infant recommended reference ranges:  Up to 24 hours.............<8.0 mg/dL  Up to 48 hours............<12.0 mg/dL  3-5 days..................<15.0 mg/dL  6-29 days.................<15.0 mg/dL           BUN 8         Calcium 9.1         Chloride 109         CO2 20         Creatinine 0.7         Differential Method Automated         eGFR >60         Eos # 0.0         Eosinophil % 0.0         Glucose 122         Gran # (ANC) 12.0         Gran % 90.0         Hematocrit 36.1         Hemoglobin 11.5         Immature Grans (Abs) 0.07  Comment: Mild elevation in immature granulocytes is non specific and   can be seen in a variety of conditions including stress response,   acute inflammation, trauma and pregnancy. Correlation with other   laboratory and clinical findings is essential.           Immature Granulocytes 0.5         Lipase   603       Lymph # 1.1         Lymph % 8.0         MCH 29.3         MCHC 31.9         MCV 92         Mono # 0.2         Mono % 1.4         MPV 9.8         nRBC 0         Platelets 260         Potassium 4.6         PROTEIN TOTAL 6.6         RBC 3.93         RDW 12.4         Sodium 136         WBC 13.31                 Physical Exam:   Constitutional: She is oriented to person, place, and time. She appears well-developed and well-nourished. No distress.    HENT:   Head: Normocephalic and atraumatic.     Neck: No thyromegaly present.     Pulmonary/Chest: Effort normal.        Abdominal: Soft. She exhibits abdominal incision (trocar incisions c/d/i with suture). She exhibits no distension and no mass. There is abdominal tenderness (appropriate post-op TTP in RUQ). There is no rebound and no guarding.             Musculoskeletal: No edema.       Neurological: She  is alert and oriented to person, place, and time.    Skin: No rash noted.    Psychiatric: She has a normal mood and affect. Her behavior is normal. Judgment and thought content normal.     Review of Systems    Assessment/Plan:     27 y.o. female  at Unknown for:    * Cholelithiasis with acute cholecystitis  23:  POD#1 s/p RA lap kendy  -doing well  -manage per general surgery  -avoid NSAID's for pain; okay for short term use of narcotics as needed for post-op pain    Pregnant  Pt with 7 week IUP confirmed by ultrasound  No acute obstetrical issues  Has f/u with CNM in our department in 2 weeks    Anxiety  No meds       BMI 40.0-44.9, adult          Seizure disorder in pregnancy, first trimester  No meds          Nayeli Delgado MD  Obstetrics  [facility]

## 2023-01-06 NOTE — ASSESSMENT & PLAN NOTE
POD 1 s/p cholecystectomy with some pancreatitis but negative MRCP    - Continue fluids, LFTS trending down  - Encouraged OOB, ambulation  - Okay for diet, monitor for tolerance  - PO prn pain and nausea control  - If tolerates breakfast and lunch with stable pain, consider discharge later this afternoon.

## 2023-01-06 NOTE — PROGRESS NOTES
MRCP shows common bile duct dilation but negative for choledocholithiasis. Will proceed with cholecystectomy tonight.     After explaining the risks (including bleeding, infection, impaired wound healing, scarring, damage to other organs, vascular complications, cardiopulmonary complications, and death), benefits, and alternatives, patient verbalized understanding and would like to proceed with surgery. All questions were answered to their satisfaction. Patient also expressed understanding that there is a risk of fetal demise.      *Addendum: Stat lipase level returned elevated. Patient with gallstone pancreatitis. Since she is feeling a little better tonight and there isn't significant inflammation of the pancreas on MRI, will proceed with surgery tonight.

## 2023-01-06 NOTE — PROGRESS NOTES
[facility]  General Surgery  Progress Note    Subjective:     History of Present Illness:  27-year-old female who is 7 weeks pregnant has been experiencing episodes of epigastric and right flank pain for gallstones for several years.  The pain is usually crampy achy.  It can be associated with nausea.  The pain usually resolves.      She developed an episode of pain that was slightly more severe.  It was more pressure to aching.  Was associated with nausea.  When it did resolve after several hours she presented to the emergency room.  She was evaluated and an ultrasound confirmed gallstones.  Liver function tests were normal.  She was admitted with early acute cholecystitis versus severe barely colic.  A Ob consult was requested due to the fact that she is going to require surgery in his 7 weeks pregnant.      This morning she still has some nausea and right abdominal/right flank pain      Post-Op Info:  Procedure(s) (LRB):  XI ROBOTIC CHOLECYSTECTOMY (N/A)   1 Day Post-Op     Interval History: POD 1 s/p cholecystectomy. Pain improved from pre-operatively, now most pain is at extraction site. LFTs and Tbili trending down. Denies any midepigastric pain radiating to the back    Medications:  Continuous Infusions:   sodium chloride 0.9% 175 mL/hr at 01/05/23 2254     Scheduled Meds:   acetaminophen  650 mg Oral Q6H    docusate sodium  100 mg Oral BID    famotidine  20 mg Oral BID     PRN Meds:HYDROmorphone, HYDROmorphone, LIDOcaine (PF) 10 mg/ml (1%), melatonin, ondansetron, oxyCODONE, oxyCODONE-acetaminophen, promethazine (PHENERGAN) IVPB, sodium chloride 0.9%     Review of patient's allergies indicates:  No Known Allergies  Objective:     Vital Signs (Most Recent):  Temp: 97.1 °F (36.2 °C) (01/06/23 0710)  Pulse: 67 (01/06/23 0710)  Resp: 16 (01/06/23 0710)  BP: 103/65 (01/06/23 0710)  SpO2: 96 % (01/06/23 0710) Vital Signs (24h Range):  Temp:  [68 °F (20 °C)-99.7 °F (37.6 °C)] 97.1 °F (36.2 °C)  Pulse:   [65-85] 67  Resp:  [14-23] 16  SpO2:  [93 %-100 %] 96 %  BP: ()/(50-65) 103/65     Weight: 105 kg (231 lb 7.7 oz)  Body mass index is 42.34 kg/m².    Intake/Output - Last 3 Shifts         01/04 0700  01/05 0659 01/05 0700  01/06 0659 01/06 0700  01/07 0659    I.V. (mL/kg)  1100.6 (10.5)     IV Piggyback 100 1124.3     Total Intake(mL/kg) 100 (1) 2224.9 (21.2)     Net +100 +2224.9                    Physical Exam  Vitals reviewed.   Constitutional:       General: She is not in acute distress.     Appearance: She is well-developed.   HENT:      Head: Normocephalic and atraumatic.      Right Ear: External ear normal.      Left Ear: External ear normal.   Eyes:      Extraocular Movements: Extraocular movements intact.      Conjunctiva/sclera: Conjunctivae normal.   Cardiovascular:      Rate and Rhythm: Normal rate.   Pulmonary:      Effort: Pulmonary effort is normal. No respiratory distress.   Abdominal:      Comments: Abdomen soft and non-distended with expected nimesh-incisional TTP and minimal TTP in midepigastric region. Incisions clean and dry without SOI   Musculoskeletal:      Cervical back: Neck supple.   Skin:     General: Skin is warm and dry.   Neurological:      Mental Status: She is alert and oriented to person, place, and time.   Psychiatric:         Behavior: Behavior normal.       Significant Labs:  I have reviewed all pertinent lab results within the past 24 hours.  CBC:   Recent Labs   Lab 01/06/23  0551   WBC 13.31*   RBC 3.93*   HGB 11.5*   HCT 36.1*      MCV 92   MCH 29.3   MCHC 31.9*     CMP:   Recent Labs   Lab 01/06/23  0551   *   CALCIUM 9.1   ALBUMIN 2.8*   PROT 6.6      K 4.6   CO2 20*      BUN 8   CREATININE 0.7   ALKPHOS 139*   *   AST 99*   BILITOT 0.4       Significant Diagnostics:  I have reviewed all pertinent imaging results/findings within the past 24 hours.  No new pertinent imaging    Assessment/Plan:     * Cholelithiasis with acute  cholecystitis  POD 1 s/p cholecystectomy with some pancreatitis but negative MRCP    - Continue fluids, LFTS trending down  - Encouraged OOB, ambulation  - Okay for diet, monitor for tolerance  - PO prn pain and nausea control  - If tolerates breakfast and lunch with stable pain, consider discharge later this afternoon.        Pregnant  Obstetrics following     BMI 40.0-44.9, adult  Patient should discuss weight loss with her primary provider    Seizure disorder in pregnancy, first trimester  Monitor        Carrie Jacobs PA-C  General Surgery  [facility]

## 2023-01-06 NOTE — TRANSFER OF CARE
"Anesthesia Transfer of Care Note    Patient: Padmini Wren    Procedure(s) Performed: Procedure(s) (LRB):  XI ROBOTIC CHOLECYSTECTOMY (N/A)    Patient location: PACU    Anesthesia Type: general    Transport from OR: Transported from OR on room air with adequate spontaneous ventilation    Post pain: adequate analgesia    Post assessment: no apparent anesthetic complications and tolerated procedure well    Post vital signs: stable    Level of consciousness: awake    Nausea/Vomiting: no nausea/vomiting    Complications: none    Transfer of care protocol was followed      Last vitals:   Visit Vitals  BP (!) 98/56 (BP Location: Right arm, Patient Position: Lying)   Pulse 65   Temp 36.6 °C (97.9 °F) (Oral)   Resp 14   Ht 5' 2" (1.575 m)   Wt 105 kg (231 lb 7.7 oz)   LMP  (LMP Unknown)   SpO2 97%   Breastfeeding No   BMI 42.34 kg/m²     "

## 2023-01-06 NOTE — PLAN OF CARE
[facility]  Discharge Final Note    Primary Care Provider: Taryn Salcido MD    Expected Discharge Date: 1/6/2023    Final Discharge Note (most recent)       Final Note - 01/06/23 1437          Final Note    Assessment Type Final Discharge Note     Anticipated Discharge Disposition Home or Self Care        Post-Acute Status    Discharge Delays None known at this time                     Contact Info       Carrie Jacobs PA-C   Specialty: General Surgery    [address]  [place] [address]   Phone: [phone]       Next Steps: Schedule an appointment as soon as possible for a visit in 2 week(s)    Instructions: For wound re-check    [facility]   Specialty: Obstetrics and Gynecology    [address] [facility] [address]   Phone: [phone]       Next Steps: Schedule an appointment as soon as possible for a visit          Pt to DC home with no CM DC needs.    Navneet Paige LMSW 1/6/2023 2:38 PM

## 2023-01-06 NOTE — SUBJECTIVE & OBJECTIVE
Obstetric HPI:  Patient denies any pelvic pain or vaginal bleeding.  Has some incisional pain in the RUQ well-controlled with pain meds.  Denies any N/V.     Objective:     Vital Signs (Most Recent):  Temp: 98.4 °F (36.9 °C) (01/06/23 1141)  Pulse: 73 (01/06/23 1141)  Resp: 18 (01/06/23 1141)  BP: (!) 106/53 (01/06/23 1141)  SpO2: (!) 94 % (01/06/23 1141)   Vital Signs (24h Range):  Temp:  [68 °F (20 °C)-99.7 °F (37.6 °C)] 98.4 °F (36.9 °C)  Pulse:  [67-85] 73  Resp:  [14-23] 18  SpO2:  [93 %-100 %] 94 %  BP: ()/(50-65) 106/53     Weight: 105 kg (231 lb 7.7 oz)  Body mass index is 42.34 kg/m².          Intake/Output Summary (Last 24 hours) at 1/6/2023 1227  Last data filed at 1/6/2023 1036  Gross per 24 hour   Intake 2464.91 ml   Output --   Net 2464.91 ml            Significant Labs:  Recent Lab Results         01/06/23  0551   01/05/23  1746        Albumin 2.8         Alkaline Phosphatase 139                  Anion Gap 7         AST 99         Baso # 0.01         Basophil % 0.1         BILIRUBIN TOTAL 0.4  Comment: For infants and newborns, interpretation of results should be based  on gestational age, weight and in agreement with clinical  observations.    Premature Infant recommended reference ranges:  Up to 24 hours.............<8.0 mg/dL  Up to 48 hours............<12.0 mg/dL  3-5 days..................<15.0 mg/dL  6-29 days.................<15.0 mg/dL           BUN 8         Calcium 9.1         Chloride 109         CO2 20         Creatinine 0.7         Differential Method Automated         eGFR >60         Eos # 0.0         Eosinophil % 0.0         Glucose 122         Gran # (ANC) 12.0         Gran % 90.0         Hematocrit 36.1         Hemoglobin 11.5         Immature Grans (Abs) 0.07  Comment: Mild elevation in immature granulocytes is non specific and   can be seen in a variety of conditions including stress response,   acute inflammation, trauma and pregnancy. Correlation with other    laboratory and clinical findings is essential.           Immature Granulocytes 0.5         Lipase   603       Lymph # 1.1         Lymph % 8.0         MCH 29.3         MCHC 31.9         MCV 92         Mono # 0.2         Mono % 1.4         MPV 9.8         nRBC 0         Platelets 260         Potassium 4.6         PROTEIN TOTAL 6.6         RBC 3.93         RDW 12.4         Sodium 136         WBC 13.31                 Physical Exam:   Constitutional: She is oriented to person, place, and time. She appears well-developed and well-nourished. No distress.    HENT:   Head: Normocephalic and atraumatic.     Neck: No thyromegaly present.     Pulmonary/Chest: Effort normal.        Abdominal: Soft. She exhibits abdominal incision (trocar incisions c/d/i with suture). She exhibits no distension and no mass. There is abdominal tenderness (appropriate post-op TTP in RUQ). There is no rebound and no guarding.             Musculoskeletal: No edema.       Neurological: She is alert and oriented to person, place, and time.    Skin: No rash noted.    Psychiatric: She has a normal mood and affect. Her behavior is normal. Judgment and thought content normal.     Review of Systems

## 2023-01-06 NOTE — OP NOTE
Padmini OLIVAREZ Maru Wren  : 1995, MRN: [id number]  Date of procedure: 2023        Procedure: DaVinci-assisted laparoscopic cholecystectomy with indocyanine green cholangiography    Pre-procedure diagnosis: Acute cholecystitis with cholelithiasis  Post-procedure diagnosis: Same  Surgeon: Nuzhat Henry DO  Assistant: None  EBL: 30 mL  Specimen: Gallbladder  Drains: None  Complications: None apparent    Significant findings: Inflamed and edematous gallbladder packed with gallstones.    Indications for procedure: The patient presents with abdominal pain, nausea, and vomiting. Workup demonstrates acute cholecystitis. A cholecystectomy was recommended. The patient is 7 weeks pregnant and understands that there is a risk of fetal demise, but the alternative to not having surgery with cholecystitis puts the fetus at a higher risk. After explaining the risks, benefits, and alternatives, the patient verbalized understanding and informed written consent was obtained.    Description of procedure: The patient was brought to the OR and placed in the supine position. SCDs, safety straps, and a footboard were applied. After general anesthesia was induced by the Anesthesia Department, the abdomen was prepped and draped in usual sterile fashion. A time out was taken to identify the correct patient, correct procedure, and correct anatomical site; all parties were in agreement.  Local anesthetic was injected into the skin. An 8 mm supraumbilical incision was made and, using a kocher, the fascia was grasped and elevated. A Veress needle was inserted and the abdomen was insufflated to 15 mm Hg. An 8 mm robotic trochar was inserted; no apparent damage to underlying structures had occurred upon entry. Additional 8 mm robotic ports were placed horizontal to the first port: two along the right abdomen and one on the left. The patient was placed in reverse Trendelenburg and rotated to the left. The [employer] robot was  docked.   The gallbladder was approached by first grasping the fundus and elevating it cephalad. There were omental attachments that were were taken down both bluntly and using careful electrocautery. Using careful blunt dissection, a window was created beneath the gallbladder neck to expose the cystic duct and artery. Using electrocautery, I then began taking down the lateral peritoneal attachments to the gallbladder so that a clear view of the liver bed was visualized behind the cystic structures. The critical view of safety was achieved with an obvious, single duct and artery exiting and entering the gallbladder. ICG cholangiography confirmed the cystic duct branching from the common bile duct. The cystic duct and artery were clipped with 2 clips proximally, 1 distally, and transected in the middle using endoshears. The gallbladder was dissected off of the liver bed using electrocautery and placed into an endocatch bag. The liver bed was inspected and good hemostasis was observed. The clips on the cystic duct and artery were inspected and found to be intact. Repeat ICG cholangiography demonstrated no bile leakage. The [employer] robot was undocked. The gallbladder was removed from the abdomen via the right most lateral port site under direct visualization.  The abdomen was desufflated. All incisions were closed with 4-0 monocryl in the subcuticular layer and dermabond was applied on top.    All sponge and instrument counts were deemed correct. The patient appeared to tolerate the procedure well and there were no apparent complications. The patient was transported to PACU in stable condition.    Nuzhat Henry,   General Surgery  [facility]

## 2023-01-06 NOTE — ASSESSMENT & PLAN NOTE
Pt with 7 week IUP confirmed by ultrasound  No acute obstetrical issues  Has f/u with CNM in our department in 2 weeks

## 2023-01-06 NOTE — PLAN OF CARE
[facility]  Initial Discharge Assessment       Primary Care Provider: Taryn Salcido MD    Admission Diagnosis: Acute cholecystitis [K81.0]    Admission Date: 1/4/2023  Expected Discharge Date: 1/6/2023    Discharge Barriers Identified: None    Payor: MEDICAID / Plan: [facility] COMMUNITY PLAN [facility] [employer] ([place] MEDICAID) / Product Type: Managed Medicaid /     Extended Emergency Contact Information  Primary Emergency Contact: Lilia España  Mobile Phone: [phone]  Relation: Mother   needed? No    Discharge Plan A: Home with family  Discharge Plan B: Home with family      [facility] [place] - [place], [place] - [address]  [address]  [place] [address]  Phone: [phone] Fax: [phone]    [facility] - [place], [place] - [address]  [address]  [address]  [place] [address]  Phone: [phone] Fax: [phone]    [facility] [address] [place] [address]  Phone: [phone] Fax: [phone]    [id number] - [place] - [address] [facility] [address]  [address] [place] [address]  Phone: [phone] Fax: [phone]      Initial Assessment (most recent)       Adult Discharge Assessment - 01/06/23 1432          Discharge Assessment    Assessment Type Final Discharge Note     Confirmed/corrected address, phone number and insurance Yes     Confirmed Demographics Correct on Facesheet     Source of Information patient     Communicated HECTOR with patient/caregiver Yes     People in Home significant other;child(krista), dependent     Do you expect to return to your current living situation? Yes     Prior to hospitilization cognitive status: Alert/Oriented     Current cognitive status: Alert/Oriented     Equipment Currently Used at Home none     Readmission within 30 days? No     Patient currently being followed by outpatient case  management? No     Do you currently have service(s) that help you manage your care at home? No     Do you take prescription medications? Yes     Do you have prescription coverage? Yes     Do you have any problems affording any of your prescribed medications? No     Is the patient taking medications as prescribed? yes     Who is going to help you get home at discharge? Boyfriend     How do you get to doctors appointments? family or friend will provide     Are you on dialysis? No     Do you take coumadin? No     Discharge Plan A Home with family     Discharge Plan B Home with family     Discharge Plan discussed with: Patient     Discharge Barriers Identified None                      Met with pt at bedside for DC assessment. Pt lives at home with her boyfriend and three minor children and does not use any DME. No CM DC needs identified at this time.    Navneet Paige LMSW 1/6/2023 2:37 PM

## 2023-01-06 NOTE — PLAN OF CARE
Pt ready for discharge, IV removed as ordered. Discharge instructions reviewed with the patient, verbalized understanding. Medications delivered to the bedside.

## 2023-01-06 NOTE — ANESTHESIA POSTPROCEDURE EVALUATION
Anesthesia Post Evaluation    Patient: Padmini Wren    Procedure(s) Performed: Procedure(s) (LRB):  XI ROBOTIC CHOLECYSTECTOMY (N/A)    Final Anesthesia Type: general      Patient location during evaluation: PACU  Patient participation: Yes- Able to Participate  Level of consciousness: awake and alert  Post-procedure vital signs: reviewed and stable  Pain management: adequate  Airway patency: patent  ASAF mitigation strategies: Verification of full reversal of neuromuscular block  PONV status at discharge: No PONV  Anesthetic complications: no      Cardiovascular status: hemodynamically stable  Respiratory status: spontaneous ventilation  Hydration status: euvolemic  Follow-up not needed.          Vitals Value Taken Time   /51 01/05/23 2105   Temp 36.5 °C (97.7 °F) 01/05/23 2105   Pulse 68 01/05/23 2105   Resp 18 01/05/23 2105   SpO2 97 % 01/05/23 2105         Event Time   Out of Recovery 01/05/2023 20:57:00         Pain/Sharonda Score: Pain Rating Prior to Med Admin: 5 (1/5/2023  8:48 PM)  Pain Rating Post Med Admin: 4 (1/5/2023  4:33 PM)  Sharonda Score: 9 (1/5/2023  8:45 PM)

## 2023-01-06 NOTE — SUBJECTIVE & OBJECTIVE
Interval History: POD 1 s/p cholecystectomy. Pain improved from pre-operatively, now most pain is at extraction site. LFTs and Tbili trending down. Denies any midepigastric pain radiating to the back    Medications:  Continuous Infusions:   sodium chloride 0.9% 175 mL/hr at 01/05/23 2254     Scheduled Meds:   acetaminophen  650 mg Oral Q6H    docusate sodium  100 mg Oral BID    famotidine  20 mg Oral BID     PRN Meds:HYDROmorphone, HYDROmorphone, LIDOcaine (PF) 10 mg/ml (1%), melatonin, ondansetron, oxyCODONE, oxyCODONE-acetaminophen, promethazine (PHENERGAN) IVPB, sodium chloride 0.9%     Review of patient's allergies indicates:  No Known Allergies  Objective:     Vital Signs (Most Recent):  Temp: 97.1 °F (36.2 °C) (01/06/23 0710)  Pulse: 67 (01/06/23 0710)  Resp: 16 (01/06/23 0710)  BP: 103/65 (01/06/23 0710)  SpO2: 96 % (01/06/23 0710) Vital Signs (24h Range):  Temp:  [68 °F (20 °C)-99.7 °F (37.6 °C)] 97.1 °F (36.2 °C)  Pulse:  [65-85] 67  Resp:  [14-23] 16  SpO2:  [93 %-100 %] 96 %  BP: ()/(50-65) 103/65     Weight: 105 kg (231 lb 7.7 oz)  Body mass index is 42.34 kg/m².    Intake/Output - Last 3 Shifts         01/04 0700  01/05 0659 01/05 0700 01/06 0659 01/06 0700 01/07 0659    I.V. (mL/kg)  1100.6 (10.5)     IV Piggyback 100 1124.3     Total Intake(mL/kg) 100 (1) 2224.9 (21.2)     Net +100 +2224.9                    Physical Exam  Vitals reviewed.   Constitutional:       General: She is not in acute distress.     Appearance: She is well-developed.   HENT:      Head: Normocephalic and atraumatic.      Right Ear: External ear normal.      Left Ear: External ear normal.   Eyes:      Extraocular Movements: Extraocular movements intact.      Conjunctiva/sclera: Conjunctivae normal.   Cardiovascular:      Rate and Rhythm: Normal rate.   Pulmonary:      Effort: Pulmonary effort is normal. No respiratory distress.   Abdominal:      Comments: Abdomen soft and non-distended with expected nimesh-incisional TTP and  minimal TTP in midepigastric region. Incisions clean and dry without SOI   Musculoskeletal:      Cervical back: Neck supple.   Skin:     General: Skin is warm and dry.   Neurological:      Mental Status: She is alert and oriented to person, place, and time.   Psychiatric:         Behavior: Behavior normal.       Significant Labs:  I have reviewed all pertinent lab results within the past 24 hours.  CBC:   Recent Labs   Lab 01/06/23  0551   WBC 13.31*   RBC 3.93*   HGB 11.5*   HCT 36.1*      MCV 92   MCH 29.3   MCHC 31.9*     CMP:   Recent Labs   Lab 01/06/23  0551   *   CALCIUM 9.1   ALBUMIN 2.8*   PROT 6.6      K 4.6   CO2 20*      BUN 8   CREATININE 0.7   ALKPHOS 139*   *   AST 99*   BILITOT 0.4       Significant Diagnostics:  I have reviewed all pertinent imaging results/findings within the past 24 hours.  No new pertinent imaging

## 2023-01-06 NOTE — DISCHARGE INSTRUCTIONS
Discharge Instructions    Hygiene and incision care:   You may shower but do not soak such as in a bathtub or pool. Your incisions are closed with absorbable sutures and there is surgical glue on top. The glue will eventually flake off on its own. Do not scrub your incisions, just allow warm soapy water to run over them.   If you develop fevers, worsening redness and/or pus-like drainage, call the office immediately.    Pain control:   You may take Tylenol (650 mg every 4 hours) as well as alternate heat and cold packs for pain and swelling. Do not take NSAIDs in pregnancy.  If taking narcotic pain medication, such as Norco (hydrocodone-acetaminophen) or Percocet (oxycodone-acetaminophen), do not drink alcohol or drive. Each Norco and Percocet tablet contains 325 mg of Tylenol; do not take more than 4000 mg of Tylenol per day. Narcotic pain medications can be constipating so be sure to drink plenty of water and take an over the counter stool softener such as colace (100 mg twice a day) or miralax (17 g once a day).    Activity:   No heavy lifting >10 lbs or sexual activity for 3 weeks while your incisions are healing as it might result in a hernia. Avoid straining, pushing, and pulling. It is okay to walk and slowly go up and down stairs. Avoid driving for at least 3 days or longer if taking narcotic pain medicine.   Make sure to do leg and ankle exercises and take deep breaths frequently to avoid developing blood clots or pneumonia.    Diet:   You may resume your regular diet. Some people find it best to stick to soft, bland, and easily digestible foods for the first couple of days while the anesthesia is leaving their system or if they're taking narcotic pain medicine to avoid nausea and vomiting. Be sure to eat good, nutritious foods such as vegetables and lean proteins to give your body the nutrients it needs to heal. I recommend also taking vitamin C as this can aid with wound healing.    For any questions or  concerns, please do not hesitate to contact the office any time at [phone] or send me a [facility] message.

## 2023-01-10 NOTE — DISCHARGE SUMMARY
[facility] Surg  General Surgery  Discharge Summary      Patient Name: Padmini Wren  MRN: [id number]  Admission Date: 1/4/2023  Hospital Length of Stay: 2 days  Discharge Date and Time: 1/6/2023  3:17 PM  Attending Physician: No att. providers found   Discharging Provider: Nuzhat Henry DO  Primary Care Provider: Taryn Salcido MD    HPI:   27-year-old female who is 7 weeks pregnant has been experiencing episodes of epigastric and right flank pain for gallstones for several years.  The pain is usually crampy achy.  It can be associated with nausea.  The pain usually resolves.      She developed an episode of pain that was slightly more severe.  It was more pressure to aching.  Was associated with nausea.  When it did resolve after several hours she presented to the emergency room.  She was evaluated and an ultrasound confirmed gallstones.  Liver function tests were normal.  She was admitted with early acute cholecystitis versus severe barely colic.  A Ob consult was requested due to the fact that she is going to require surgery in his 7 weeks pregnant.      This morning she still has some nausea and right abdominal/right flank pain      Procedure(s) (LRB):  XI ROBOTIC CHOLECYSTECTOMY (N/A)      Indwelling Lines/Drains at time of discharge:   Lines/Drains/Airways     None               Hospital Course: 01/05/2022: MRCP negative, underwent robotic cholecystectomy, lipase elevated at 600    01/06/2022: POD 1 s/p cholecystectomy. Pain improved from pre-operatively, now most pain is at extraction site. LFTs and Tbili trending down. Tolerating a diet, VSS.      Goals of Care Treatment Preferences:  Code Status: Full Code      Consults:     Significant Diagnostic Studies:     Pending Diagnostic Studies:     Procedure Component Value Units Date/Time    Specimen to Pathology, Surgery General Surgery [[id number]] Collected: 01/05/23 1950    Order Status: Sent Lab Status: In process Updated: 01/06/23  0935    Specimen: Tissue         Final Active Diagnoses:    Diagnosis Date Noted POA    PRINCIPAL PROBLEM:  Cholelithiasis with acute cholecystitis [K80.00] 01/05/2023 Yes    Pregnant [Z34.90] 01/05/2023 Not Applicable    Maternal morbid obesity, antepartum [O99.210, E66.01] 11/28/2017 Yes    BMI 40.0-44.9, adult [Z68.41] 03/02/2017 Not Applicable     Chronic    Seizure disorder in pregnancy, first trimester [O99.351, G40.909] 03/24/2014 Yes      Problems Resolved During this Admission:      Discharged Condition: good    Disposition: Home or Self Care    Follow Up:   Follow-up Information     Carrie Jacobs PA-C. Schedule an appointment as soon as possible for a visit in 2 week(s).    Specialty: General Surgery  Why: For wound re-check  Contact information:  [address]  [place] [address] [phone]             [facility]. Schedule an appointment as soon as possible for a visit.    Specialty: Obstetrics and Gynecology  Contact information:  [address] [address] [phone]                     Patient Instructions:   No discharge procedures on file.  Medications:  Reconciled Home Medications:      Medication List      START taking these medications    docusate sodium 100 MG capsule  Commonly known as: COLACE  Take 1 capsule (100 mg total) by mouth 2 (two) times daily.     HYDROcodone-acetaminophen 7.5-325 mg per tablet  Commonly known as: NORCO  Take 1 tablet by mouth every 4 to 6 hours as needed for Pain.        CONTINUE taking these medications    famotidine 20 MG tablet  Commonly known as: PEPCID  Take 20 mg by mouth 2 (two) times daily.     ondansetron 4 MG Tbdl  Commonly known as: ZOFRAN-ODT  Take 4 mg by mouth every 6 (six) hours as needed.          Time spent on the discharge of patient: 30 minutes    Nuzhat Henyr, DO  General Surgery  [facility] - Med Surg

## 2023-01-11 LAB
FINAL PATHOLOGIC DIAGNOSIS: NORMAL
FINAL PATHOLOGIC DIAGNOSIS: NORMAL
Lab: NORMAL
Lab: NORMAL

## 2023-01-17 ENCOUNTER — TELEPHONE (OUTPATIENT)
Dept: OBSTETRICS AND GYNECOLOGY | Facility: CLINIC | Age: 28
End: 2023-01-17
Payer: MEDICAID

## 2023-01-17 ENCOUNTER — LAB VISIT (OUTPATIENT)
Dept: LAB | Facility: HOSPITAL | Age: 28
End: 2023-01-17
Attending: ADVANCED PRACTICE MIDWIFE
Payer: MEDICAID

## 2023-01-17 DIAGNOSIS — Z13.79 GENETIC SCREENING: ICD-10-CM

## 2023-01-17 PROCEDURE — 36415 COLL VENOUS BLD VENIPUNCTURE: CPT | Mod: PO | Performed by: ADVANCED PRACTICE MIDWIFE

## 2023-01-26 ENCOUNTER — TELEPHONE (OUTPATIENT)
Dept: OBSTETRICS AND GYNECOLOGY | Facility: CLINIC | Age: 28
End: 2023-01-26
Payer: MEDICAID

## 2023-01-26 NOTE — TELEPHONE ENCOUNTER
----- Message from Stu Novoa sent at 1/26/2023  2:58 PM CST -----  Contact: patient  Patient is calling for test results from last week please call her at [phone]           Thanks

## 2023-01-26 NOTE — TELEPHONE ENCOUNTER
----- Message from Stu Novoa sent at 1/26/2023  2:58 PM CST -----  Contact: patient  Patient is calling for test results from last week please call her at [phone]           Thanks

## 2023-01-27 ENCOUNTER — TELEPHONE (OUTPATIENT)
Dept: OBSTETRICS AND GYNECOLOGY | Facility: CLINIC | Age: 28
End: 2023-01-27
Payer: MEDICAID

## 2023-01-31 ENCOUNTER — PROCEDURE VISIT (OUTPATIENT)
Dept: OBSTETRICS AND GYNECOLOGY | Facility: CLINIC | Age: 28
End: 2023-01-31
Payer: MEDICAID

## 2023-01-31 ENCOUNTER — INITIAL PRENATAL (OUTPATIENT)
Dept: OBSTETRICS AND GYNECOLOGY | Facility: CLINIC | Age: 28
End: 2023-01-31
Payer: MEDICAID

## 2023-01-31 VITALS
WEIGHT: 235.88 LBS | SYSTOLIC BLOOD PRESSURE: 126 MMHG | BODY MASS INDEX: 43.15 KG/M2 | DIASTOLIC BLOOD PRESSURE: 72 MMHG

## 2023-01-31 DIAGNOSIS — G40.909 SEIZURE DISORDER IN PREGNANCY, FIRST TRIMESTER: ICD-10-CM

## 2023-01-31 DIAGNOSIS — O99.351 SEIZURE DISORDER IN PREGNANCY, FIRST TRIMESTER: ICD-10-CM

## 2023-01-31 DIAGNOSIS — O99.211 OBESITY AFFECTING PREGNANCY IN FIRST TRIMESTER: Primary | ICD-10-CM

## 2023-01-31 DIAGNOSIS — Z32.01 POSITIVE PREGNANCY TEST: ICD-10-CM

## 2023-01-31 DIAGNOSIS — Z3A.11 11 WEEKS GESTATION OF PREGNANCY: ICD-10-CM

## 2023-01-31 PROCEDURE — 99213 PR OFFICE/OUTPT VISIT, EST, LEVL III, 20-29 MIN: ICD-10-PCS | Mod: S$PBB,TH,, | Performed by: ADVANCED PRACTICE MIDWIFE

## 2023-01-31 PROCEDURE — 1111F PR DISCHARGE MEDS RECONCILED W/ CURRENT OUTPATIENT MED LIST: ICD-10-PCS | Mod: CPTII,,, | Performed by: ADVANCED PRACTICE MIDWIFE

## 2023-01-31 PROCEDURE — 99999 PR PBB SHADOW E&M-EST. PATIENT-LVL II: CPT | Mod: PBBFAC,,, | Performed by: ADVANCED PRACTICE MIDWIFE

## 2023-01-31 PROCEDURE — 99212 OFFICE O/P EST SF 10 MIN: CPT | Mod: PBBFAC,TH,PO | Performed by: ADVANCED PRACTICE MIDWIFE

## 2023-01-31 PROCEDURE — 1111F DSCHRG MED/CURRENT MED MERGE: CPT | Mod: CPTII,,, | Performed by: ADVANCED PRACTICE MIDWIFE

## 2023-01-31 PROCEDURE — 99213 OFFICE O/P EST LOW 20 MIN: CPT | Mod: S$PBB,TH,, | Performed by: ADVANCED PRACTICE MIDWIFE

## 2023-01-31 PROCEDURE — 76801 OB US < 14 WKS SINGLE FETUS: CPT | Mod: PBBFAC,PO | Performed by: OBSTETRICS & GYNECOLOGY

## 2023-01-31 PROCEDURE — 76801 US OB/GYN PROCEDURE (VIEWPOINT): ICD-10-PCS | Mod: 26,S$PBB,, | Performed by: OBSTETRICS & GYNECOLOGY

## 2023-01-31 PROCEDURE — 99999 PR PBB SHADOW E&M-EST. PATIENT-LVL II: ICD-10-PCS | Mod: PBBFAC,,, | Performed by: ADVANCED PRACTICE MIDWIFE

## 2023-02-03 ENCOUNTER — TELEPHONE (OUTPATIENT)
Dept: OBSTETRICS AND GYNECOLOGY | Facility: CLINIC | Age: 28
End: 2023-02-03
Payer: MEDICAID

## 2023-02-03 ENCOUNTER — PATIENT MESSAGE (OUTPATIENT)
Dept: ADMINISTRATIVE | Facility: OTHER | Age: 28
End: 2023-02-03
Payer: MEDICAID

## 2023-02-03 NOTE — TELEPHONE ENCOUNTER
Pt scheduled on 3/2 with Joseph at [facility]. Pt verbalized understanding and agreed to date, time, and location of appt

## 2023-03-02 ENCOUNTER — TELEPHONE (OUTPATIENT)
Dept: OBSTETRICS AND GYNECOLOGY | Facility: CLINIC | Age: 28
End: 2023-03-02

## 2023-03-02 ENCOUNTER — ROUTINE PRENATAL (OUTPATIENT)
Dept: OBSTETRICS AND GYNECOLOGY | Facility: CLINIC | Age: 28
End: 2023-03-02
Payer: MEDICAID

## 2023-03-02 VITALS — DIASTOLIC BLOOD PRESSURE: 68 MMHG | SYSTOLIC BLOOD PRESSURE: 126 MMHG

## 2023-03-02 DIAGNOSIS — Z36.89 ENCOUNTER FOR FETAL ANATOMIC SURVEY: ICD-10-CM

## 2023-03-02 DIAGNOSIS — O99.212 OBESITY AFFECTING PREGNANCY IN SECOND TRIMESTER: Primary | ICD-10-CM

## 2023-03-02 PROBLEM — K80.00 CHOLELITHIASIS WITH ACUTE CHOLECYSTITIS: Status: RESOLVED | Noted: 2023-01-05 | Resolved: 2023-03-02

## 2023-03-02 PROBLEM — K80.20 GALLSTONES: Status: RESOLVED | Noted: 2017-10-10 | Resolved: 2023-03-02

## 2023-03-02 PROCEDURE — 99212 OFFICE O/P EST SF 10 MIN: CPT | Mod: PBBFAC,TH,PO | Performed by: ADVANCED PRACTICE MIDWIFE

## 2023-03-02 PROCEDURE — 99999 PR PBB SHADOW E&M-EST. PATIENT-LVL II: ICD-10-PCS | Mod: PBBFAC,,, | Performed by: ADVANCED PRACTICE MIDWIFE

## 2023-03-02 PROCEDURE — 99213 PR OFFICE/OUTPT VISIT, EST, LEVL III, 20-29 MIN: ICD-10-PCS | Mod: TH,S$PBB,, | Performed by: ADVANCED PRACTICE MIDWIFE

## 2023-03-02 PROCEDURE — 99999 PR PBB SHADOW E&M-EST. PATIENT-LVL II: CPT | Mod: PBBFAC,,, | Performed by: ADVANCED PRACTICE MIDWIFE

## 2023-03-02 PROCEDURE — 99213 OFFICE O/P EST LOW 20 MIN: CPT | Mod: TH,S$PBB,, | Performed by: ADVANCED PRACTICE MIDWIFE

## 2023-03-02 NOTE — PROGRESS NOTES
27 y.o. female  at 15w5d   Not feeling flutters, denies VB, LOF or cramping  Doing well without concerns   /68   LMP  (LMP Unknown) Comment: 7 weeks pregnant  TW lbs, scale not working in clinic today   Genetic testing: MT21 negative, female  Anatomy scan ordered for nv    Obesity affecting pregnancy in second trimester    Encounter for fetal anatomic survey  -     US OB/GYN Procedure ([facility])-Future; Future         Reviewed warning signs, pregnancy precautions and how/when to call.  RTC x 4 wks, call or present sooner prn.

## 2023-03-04 ENCOUNTER — PATIENT MESSAGE (OUTPATIENT)
Dept: OTHER | Facility: OTHER | Age: 28
End: 2023-03-04
Payer: MEDICAID

## 2023-03-11 ENCOUNTER — PATIENT MESSAGE (OUTPATIENT)
Dept: OTHER | Facility: OTHER | Age: 28
End: 2023-03-11
Payer: MEDICAID

## 2023-03-28 ENCOUNTER — PROCEDURE VISIT (OUTPATIENT)
Dept: OBSTETRICS AND GYNECOLOGY | Facility: CLINIC | Age: 28
End: 2023-03-28
Payer: MEDICAID

## 2023-03-28 ENCOUNTER — ROUTINE PRENATAL (OUTPATIENT)
Dept: OBSTETRICS AND GYNECOLOGY | Facility: CLINIC | Age: 28
End: 2023-03-28
Payer: MEDICAID

## 2023-03-28 VITALS
DIASTOLIC BLOOD PRESSURE: 69 MMHG | BODY MASS INDEX: 43.55 KG/M2 | SYSTOLIC BLOOD PRESSURE: 113 MMHG | WEIGHT: 238.13 LBS

## 2023-03-28 DIAGNOSIS — O99.212 OBESITY AFFECTING PREGNANCY IN SECOND TRIMESTER: Primary | ICD-10-CM

## 2023-03-28 DIAGNOSIS — M06.9 RHEUMATOID ARTHRITIS, INVOLVING UNSPECIFIED SITE, UNSPECIFIED WHETHER RHEUMATOID FACTOR PRESENT: ICD-10-CM

## 2023-03-28 DIAGNOSIS — O99.352 SEIZURE DISORDER DURING PREGNANCY IN SECOND TRIMESTER: ICD-10-CM

## 2023-03-28 DIAGNOSIS — Z36.2 ENCOUNTER FOR FOLLOW-UP ULTRASOUND OF FETAL ANATOMY: ICD-10-CM

## 2023-03-28 DIAGNOSIS — Z3A.19 19 WEEKS GESTATION OF PREGNANCY: ICD-10-CM

## 2023-03-28 DIAGNOSIS — Z36.89 ENCOUNTER FOR FETAL ANATOMIC SURVEY: ICD-10-CM

## 2023-03-28 DIAGNOSIS — G40.909 SEIZURE DISORDER DURING PREGNANCY IN SECOND TRIMESTER: ICD-10-CM

## 2023-03-28 PROCEDURE — 99213 PR OFFICE/OUTPT VISIT, EST, LEVL III, 20-29 MIN: ICD-10-PCS | Mod: TH,S$PBB,, | Performed by: ADVANCED PRACTICE MIDWIFE

## 2023-03-28 PROCEDURE — 76805 OB US >/= 14 WKS SNGL FETUS: CPT | Mod: PBBFAC,PO | Performed by: OBSTETRICS & GYNECOLOGY

## 2023-03-28 PROCEDURE — 99213 OFFICE O/P EST LOW 20 MIN: CPT | Mod: TH,S$PBB,, | Performed by: ADVANCED PRACTICE MIDWIFE

## 2023-03-28 PROCEDURE — 76805 US OB/GYN PROCEDURE (VIEWPOINT): ICD-10-PCS | Mod: 26,S$PBB,, | Performed by: OBSTETRICS & GYNECOLOGY

## 2023-03-28 PROCEDURE — 99212 OFFICE O/P EST SF 10 MIN: CPT | Mod: PBBFAC,TH,PO | Performed by: ADVANCED PRACTICE MIDWIFE

## 2023-03-28 PROCEDURE — 99999 PR PBB SHADOW E&M-EST. PATIENT-LVL II: ICD-10-PCS | Mod: PBBFAC,,, | Performed by: ADVANCED PRACTICE MIDWIFE

## 2023-03-28 PROCEDURE — 99999 PR PBB SHADOW E&M-EST. PATIENT-LVL II: CPT | Mod: PBBFAC,,, | Performed by: ADVANCED PRACTICE MIDWIFE

## 2023-03-28 RX ORDER — ASPIRIN 81 MG/1
81 TABLET ORAL DAILY
Refills: 0 | COMMUNITY
Start: 2023-03-28 | End: 2024-01-02

## 2023-03-28 NOTE — PROGRESS NOTES
28 y.o. female  at 19w3d    feeling flutters/FM, denies VB, LOF or cramping  Doing well without concerns   TW lbs   Consult rheumatology for RA  Reviewed anatomy US-normal fetal anatomy with no obvious abnormalities. Suboptimal views spine S=D. Normal amniotic fluid, normal placental location in right lateral position, no evidence of previa. Normal appearing cervical length at . Female gender. 3VC. 11 oz  Genetic testing Mat 21    Reviewed warning signs, normal FM,  labor precautions and how/when to call.  RTC x 4 wks, call or present sooner prn.

## 2023-04-01 ENCOUNTER — PATIENT MESSAGE (OUTPATIENT)
Dept: OTHER | Facility: OTHER | Age: 28
End: 2023-04-01
Payer: MEDICAID

## 2023-04-19 NOTE — TELEPHONE ENCOUNTER
----- Message from Deloris Yeboah sent at 8/2/2017 11:07 AM CDT -----  Contact: Pt  Pt is calling to get her lab results. Please give pt a call at ..[phone] (home)     
Attempted call to patient; no answer, no voicemail set up  
No, not prescribed...

## 2023-04-27 ENCOUNTER — ROUTINE PRENATAL (OUTPATIENT)
Dept: OBSTETRICS AND GYNECOLOGY | Facility: CLINIC | Age: 28
End: 2023-04-27
Payer: MEDICAID

## 2023-04-27 ENCOUNTER — PROCEDURE VISIT (OUTPATIENT)
Dept: OBSTETRICS AND GYNECOLOGY | Facility: CLINIC | Age: 28
End: 2023-04-27
Payer: MEDICAID

## 2023-04-27 VITALS — WEIGHT: 246.25 LBS | DIASTOLIC BLOOD PRESSURE: 54 MMHG | SYSTOLIC BLOOD PRESSURE: 97 MMHG | BODY MASS INDEX: 45.04 KG/M2

## 2023-04-27 DIAGNOSIS — Z3A.19 19 WEEKS GESTATION OF PREGNANCY: ICD-10-CM

## 2023-04-27 DIAGNOSIS — Z36.2 ENCOUNTER FOR FOLLOW-UP ULTRASOUND OF FETAL ANATOMY: ICD-10-CM

## 2023-04-27 DIAGNOSIS — G40.909 SEIZURE DISORDER DURING PREGNANCY IN SECOND TRIMESTER: ICD-10-CM

## 2023-04-27 DIAGNOSIS — O99.212 OBESITY AFFECTING PREGNANCY IN SECOND TRIMESTER: ICD-10-CM

## 2023-04-27 DIAGNOSIS — O99.352 SEIZURE DISORDER DURING PREGNANCY IN SECOND TRIMESTER: ICD-10-CM

## 2023-04-27 DIAGNOSIS — O99.212 OBESITY AFFECTING PREGNANCY IN SECOND TRIMESTER: Primary | ICD-10-CM

## 2023-04-27 PROCEDURE — 99214 PR OFFICE/OUTPT VISIT, EST, LEVL IV, 30-39 MIN: ICD-10-PCS | Mod: TH,S$PBB,, | Performed by: ADVANCED PRACTICE MIDWIFE

## 2023-04-27 PROCEDURE — 99999 PR PBB SHADOW E&M-EST. PATIENT-LVL III: ICD-10-PCS | Mod: PBBFAC,,, | Performed by: ADVANCED PRACTICE MIDWIFE

## 2023-04-27 PROCEDURE — 76816 US OB/GYN PROCEDURE (VIEWPOINT): ICD-10-PCS | Mod: 26,S$PBB,, | Performed by: OBSTETRICS & GYNECOLOGY

## 2023-04-27 PROCEDURE — 99999 PR PBB SHADOW E&M-EST. PATIENT-LVL III: CPT | Mod: PBBFAC,,, | Performed by: ADVANCED PRACTICE MIDWIFE

## 2023-04-27 PROCEDURE — 76816 OB US FOLLOW-UP PER FETUS: CPT | Mod: PBBFAC,PO | Performed by: OBSTETRICS & GYNECOLOGY

## 2023-04-27 PROCEDURE — 99213 OFFICE O/P EST LOW 20 MIN: CPT | Mod: PBBFAC,TH,PO | Performed by: ADVANCED PRACTICE MIDWIFE

## 2023-04-27 PROCEDURE — 99214 OFFICE O/P EST MOD 30 MIN: CPT | Mod: TH,S$PBB,, | Performed by: ADVANCED PRACTICE MIDWIFE

## 2023-04-27 NOTE — PROGRESS NOTES
28 y.o. female  at 23w5d   Reports + FM, denies VB, LOF, or cramping  Doing well without concerns  BP (!) 97/54   Wt 111.7 kg (246 lb 4.1 oz)   LMP  (LMP Unknown) Comment: 7 weeks pregnant  BMI 45.04 kg/m²   TWG: 10 lbs   Reviewed upcoming 28wk labs, (A POS) and orders placed  Tdap handout provided and explained  Ultrasound today with cephalic presentation, anterior placenta, 3VC, KRUPA WNL, MVP 4.9cm, EFW 45%, 1lb 6oz, all structures visualized, reviewed with pt and instructed that MFM will be reviewing scan  Obesity affecting pregnancy in second trimester  -     OB Glucose Screen; Future; Expected date: 2023  -     CBC auto differential; Future; Expected date: 2023  -     HIV 1/2 Ag/Ab (4th Gen); Future; Expected date: 2023  -     RPR; Future; Expected date: 2023    Reviewed warning signs, normal FM,  labor precautions and how/when to call.  RTC x 4 wks, call or present sooner prn.

## 2023-04-29 ENCOUNTER — PATIENT MESSAGE (OUTPATIENT)
Dept: OTHER | Facility: OTHER | Age: 28
End: 2023-04-29
Payer: MEDICAID

## 2023-05-13 ENCOUNTER — PATIENT MESSAGE (OUTPATIENT)
Dept: OTHER | Facility: OTHER | Age: 28
End: 2023-05-13
Payer: MEDICAID

## 2023-05-15 ENCOUNTER — ROUTINE PRENATAL (OUTPATIENT)
Dept: OBSTETRICS AND GYNECOLOGY | Facility: CLINIC | Age: 28
End: 2023-05-15
Payer: MEDICAID

## 2023-05-15 VITALS
SYSTOLIC BLOOD PRESSURE: 122 MMHG | WEIGHT: 246.69 LBS | DIASTOLIC BLOOD PRESSURE: 78 MMHG | BODY MASS INDEX: 45.12 KG/M2

## 2023-05-15 DIAGNOSIS — N89.8 VAGINAL DISCHARGE: Primary | ICD-10-CM

## 2023-05-15 PROCEDURE — 99212 OFFICE O/P EST SF 10 MIN: CPT | Mod: S$PBB,,,

## 2023-05-15 PROCEDURE — 99999 PR PBB SHADOW E&M-EST. PATIENT-LVL I: ICD-10-PCS | Mod: PBBFAC,,,

## 2023-05-15 PROCEDURE — 81514 NFCT DS BV&VAGINITIS DNA ALG: CPT

## 2023-05-15 PROCEDURE — 99212 PR OFFICE/OUTPT VISIT, EST, LEVL II, 10-19 MIN: ICD-10-PCS | Mod: S$PBB,,,

## 2023-05-15 PROCEDURE — 99211 OFF/OP EST MAY X REQ PHY/QHP: CPT | Mod: PBBFAC

## 2023-05-15 PROCEDURE — 99999 PR PBB SHADOW E&M-EST. PATIENT-LVL I: CPT | Mod: PBBFAC,,,

## 2023-05-15 NOTE — PROGRESS NOTES
28y.o female  at 26w2d  SUBJECTIVE:   28 y.o. female complains of vaginal discharge for 1 day   Denies abnormal vaginal bleeding or significant pelvic pain gomez fever. No UTI symptoms. Denies history of known exposure to STD.    Patient spent much of the day yesterday outside on Mother's Day, she reports vaginal discharge and mild cramping.   Reports +FM, denies VB  No LMP recorded (lmp unknown). Patient is pregnant.    OBJECTIVE:   She appears well, afebrile.    ASSESSMENT:  Cervix closed   Diagnoses and all orders for this visit:    Vaginal discharge  -     Vaginosis Screen by DNA Probe           PLAN:   Cultures colleted will follow up and treat if necessary  Increase water intake and rest when needed  Reviewed when she should report to ER if necessary  Continue to monitor cramping     Follow up with me PRN if symptoms persist or worsen.    Ana Clements, NP

## 2023-05-16 LAB
BACTERIAL VAGINOSIS DNA: POSITIVE
CANDIDA GLABRATA DNA: NEGATIVE
CANDIDA KRUSEI DNA: NEGATIVE
CANDIDA RRNA VAG QL PROBE: NEGATIVE
T VAGINALIS RRNA GENITAL QL PROBE: NEGATIVE

## 2023-05-17 DIAGNOSIS — N76.0 BACTERIAL VAGINITIS: Primary | ICD-10-CM

## 2023-05-17 DIAGNOSIS — B96.89 BACTERIAL VAGINITIS: Primary | ICD-10-CM

## 2023-05-17 RX ORDER — METRONIDAZOLE 500 MG/1
500 TABLET ORAL 2 TIMES DAILY
Qty: 14 TABLET | Refills: 0 | Status: SHIPPED | OUTPATIENT
Start: 2023-05-17 | End: 2023-05-24

## 2023-05-25 ENCOUNTER — PATIENT MESSAGE (OUTPATIENT)
Dept: OBSTETRICS AND GYNECOLOGY | Facility: CLINIC | Age: 28
End: 2023-05-25
Payer: MEDICAID

## 2023-05-25 ENCOUNTER — TELEPHONE (OUTPATIENT)
Dept: OBSTETRICS AND GYNECOLOGY | Facility: CLINIC | Age: 28
End: 2023-05-25
Payer: MEDICAID

## 2023-05-25 DIAGNOSIS — Z3A.28 28 WEEKS GESTATION OF PREGNANCY: Primary | ICD-10-CM

## 2023-05-25 NOTE — TELEPHONE ENCOUNTER
Patient arrived for 1 hr gtt. Patient stated that she had 2 sips of a smoothie. Advised pt to r/s labs due to us not knowing the amount of sugar in the smoothie. Informed pt that JA had to leave clinic due to a patient emergency.Pt r/s for labs and appt. Pt verbalized understanding and agreed to date, time, and location.

## 2023-05-27 ENCOUNTER — PATIENT MESSAGE (OUTPATIENT)
Dept: OTHER | Facility: OTHER | Age: 28
End: 2023-05-27
Payer: MEDICAID

## 2023-06-10 ENCOUNTER — PATIENT MESSAGE (OUTPATIENT)
Dept: OTHER | Facility: OTHER | Age: 28
End: 2023-06-10
Payer: MEDICAID

## 2023-06-24 ENCOUNTER — PATIENT MESSAGE (OUTPATIENT)
Dept: OTHER | Facility: OTHER | Age: 28
End: 2023-06-24
Payer: MEDICAID

## 2023-07-16 ENCOUNTER — HOSPITAL ENCOUNTER (OUTPATIENT)
Facility: HOSPITAL | Age: 28
Discharge: HOME | End: 2023-07-16
Attending: OBSTETRICS & GYNECOLOGY | Admitting: OBSTETRICS & GYNECOLOGY
Payer: MEDICAID

## 2023-07-16 VITALS
BODY MASS INDEX: 43.24 KG/M2 | WEIGHT: 235 LBS | HEART RATE: 114 BPM | TEMPERATURE: 98 F | HEIGHT: 62 IN | DIASTOLIC BLOOD PRESSURE: 74 MMHG | OXYGEN SATURATION: 97 % | SYSTOLIC BLOOD PRESSURE: 108 MMHG

## 2023-07-16 DIAGNOSIS — O26.899 CRAMPING AFFECTING PREGNANCY, ANTEPARTUM: ICD-10-CM

## 2023-07-16 DIAGNOSIS — R10.9 CRAMPING AFFECTING PREGNANCY, ANTEPARTUM: ICD-10-CM

## 2023-07-16 PROBLEM — O99.213 OBESITY AFFECTING PREGNANCY IN THIRD TRIMESTER: Status: ACTIVE | Noted: 2023-01-31

## 2023-07-16 PROBLEM — O09.33 LIMITED PRENATAL CARE IN THIRD TRIMESTER: Status: ACTIVE | Noted: 2023-07-16

## 2023-07-16 PROBLEM — D64.9 MILD ANEMIA: Status: ACTIVE | Noted: 2023-07-16

## 2023-07-16 LAB
AMPHET+METHAMPHET UR QL: NEGATIVE
BACTERIA #/AREA URNS HPF: ABNORMAL /HPF
BARBITURATES UR QL SCN>200 NG/ML: NEGATIVE
BASOPHILS # BLD AUTO: 0.03 K/UL (ref 0–0.2)
BASOPHILS NFR BLD: 0.2 % (ref 0–1.9)
BENZODIAZ UR QL SCN>200 NG/ML: NEGATIVE
BILIRUB UR QL STRIP: NEGATIVE
BZE UR QL SCN: NEGATIVE
CANNABINOIDS UR QL SCN: NEGATIVE
CLARITY UR: CLEAR
COLOR UR: YELLOW
CREAT UR-MCNC: 111.5 MG/DL (ref 15–325)
DIFFERENTIAL METHOD: ABNORMAL
EOSINOPHIL # BLD AUTO: 0.3 K/UL (ref 0–0.5)
EOSINOPHIL NFR BLD: 1.6 % (ref 0–8)
ERYTHROCYTE [DISTWIDTH] IN BLOOD BY AUTOMATED COUNT: 14.2 % (ref 11.5–14.5)
GLUCOSE UR QL STRIP: NEGATIVE
HCT VFR BLD AUTO: 32.8 % (ref 37–48.5)
HGB BLD-MCNC: 10.2 G/DL (ref 12–16)
HGB UR QL STRIP: NEGATIVE
HIV 1+2 AB+HIV1 P24 AG SERPL QL IA: NEGATIVE
IMM GRANULOCYTES # BLD AUTO: 0.18 K/UL (ref 0–0.04)
IMM GRANULOCYTES NFR BLD AUTO: 1.2 % (ref 0–0.5)
KETONES UR QL STRIP: NEGATIVE
LEUKOCYTE ESTERASE UR QL STRIP: ABNORMAL
LYMPHOCYTES # BLD AUTO: 2.8 K/UL (ref 1–4.8)
LYMPHOCYTES NFR BLD: 18.5 % (ref 18–48)
MCH RBC QN AUTO: 27.4 PG (ref 27–31)
MCHC RBC AUTO-ENTMCNC: 31.1 G/DL (ref 32–36)
MCV RBC AUTO: 88 FL (ref 82–98)
METHADONE UR QL SCN>300 NG/ML: NEGATIVE
MICROSCOPIC COMMENT: ABNORMAL
MONOCYTES # BLD AUTO: 1 K/UL (ref 0.3–1)
MONOCYTES NFR BLD: 6.6 % (ref 4–15)
NEUTROPHILS # BLD AUTO: 11 K/UL (ref 1.8–7.7)
NEUTROPHILS NFR BLD: 71.9 % (ref 38–73)
NITRITE UR QL STRIP: NEGATIVE
NRBC BLD-RTO: 0 /100 WBC
OPIATES UR QL SCN: NEGATIVE
PCP UR QL SCN>25 NG/ML: NEGATIVE
PH UR STRIP: 6 [PH] (ref 5–8)
PLATELET # BLD AUTO: 282 K/UL (ref 150–450)
PMV BLD AUTO: 9.6 FL (ref 9.2–12.9)
PROT UR QL STRIP: ABNORMAL
RBC # BLD AUTO: 3.72 M/UL (ref 4–5.4)
RBC #/AREA URNS HPF: 2 /HPF (ref 0–4)
RPR SER QL: NORMAL
SP GR UR STRIP: 1.02 (ref 1–1.03)
SQUAMOUS #/AREA URNS HPF: 5 /HPF
TOXICOLOGY INFORMATION: NORMAL
URN SPEC COLLECT METH UR: ABNORMAL
UROBILINOGEN UR STRIP-ACNC: NEGATIVE EU/DL
WBC # BLD AUTO: 15.31 K/UL (ref 3.9–12.7)
WBC #/AREA URNS HPF: 9 /HPF (ref 0–5)

## 2023-07-16 PROCEDURE — 87389 HIV-1 AG W/HIV-1&-2 AB AG IA: CPT | Performed by: MIDWIFE

## 2023-07-16 PROCEDURE — 59025 FETAL NON-STRESS TEST: CPT | Mod: 26,,, | Performed by: MIDWIFE

## 2023-07-16 PROCEDURE — 87077 CULTURE AEROBIC IDENTIFY: CPT | Performed by: MIDWIFE

## 2023-07-16 PROCEDURE — 99214 OFFICE O/P EST MOD 30 MIN: CPT | Mod: TH,25,, | Performed by: MIDWIFE

## 2023-07-16 PROCEDURE — 81000 URINALYSIS NONAUTO W/SCOPE: CPT | Mod: 59 | Performed by: MIDWIFE

## 2023-07-16 PROCEDURE — 87081 CULTURE SCREEN ONLY: CPT | Mod: 59 | Performed by: MIDWIFE

## 2023-07-16 PROCEDURE — 80307 DRUG TEST PRSMV CHEM ANLYZR: CPT | Performed by: MIDWIFE

## 2023-07-16 PROCEDURE — 59025 FETAL NON-STRESS TEST: CPT | Mod: 59

## 2023-07-16 PROCEDURE — 87186 SC STD MICRODIL/AGAR DIL: CPT | Performed by: MIDWIFE

## 2023-07-16 PROCEDURE — 87088 URINE BACTERIA CULTURE: CPT | Performed by: MIDWIFE

## 2023-07-16 PROCEDURE — 99211 OFF/OP EST MAY X REQ PHY/QHP: CPT | Mod: 25

## 2023-07-16 PROCEDURE — 83036 HEMOGLOBIN GLYCOSYLATED A1C: CPT | Performed by: MIDWIFE

## 2023-07-16 PROCEDURE — 86592 SYPHILIS TEST NON-TREP QUAL: CPT | Performed by: MIDWIFE

## 2023-07-16 PROCEDURE — 85025 COMPLETE CBC W/AUTO DIFF WBC: CPT | Performed by: MIDWIFE

## 2023-07-16 PROCEDURE — 59025 OBTAIN FETAL NONSTRESS TEST (NST): ICD-10-PCS | Mod: 26,,, | Performed by: MIDWIFE

## 2023-07-16 PROCEDURE — 87086 URINE CULTURE/COLONY COUNT: CPT | Performed by: MIDWIFE

## 2023-07-16 PROCEDURE — 99214 PR OFFICE/OUTPT VISIT, EST, LEVL IV, 30-39 MIN: ICD-10-PCS | Mod: TH,25,, | Performed by: MIDWIFE

## 2023-07-16 RX ORDER — FERROUS SULFATE 325(65) MG
325 TABLET, DELAYED RELEASE (ENTERIC COATED) ORAL DAILY
Qty: 60 TABLET | Refills: 1 | Status: SHIPPED | OUTPATIENT
Start: 2023-07-16

## 2023-07-17 LAB
ESTIMATED AVG GLUCOSE: 108 MG/DL (ref 68–131)
HBA1C MFR BLD: 5.4 % (ref 4–5.6)

## 2023-07-17 NOTE — ASSESSMENT & PLAN NOTE
OB triage  C/o cramping on and off. Admits to not drinking much water. No VB/LOF. Cervix closed. D/c to home with usual precautions & stressed hydration.

## 2023-07-17 NOTE — HOSPITAL COURSE
OB triage  C/o cramping on and off. Admits to not drinking much water. No VB/LOF. Cervix closed. D/c to home with usual precautions.

## 2023-07-17 NOTE — H&P
[facility] Labor [facility] Delivery  Obstetrics  History & Physical    Patient Name: Padmini Wren  MRN: [id number]  Admission Date: 2023  Primary Care Provider: Taryn Salcido MD    Subjective:     Principal Problem:Cramping affecting pregnancy, antepartum    History of Present Illness:   35w1d c/o cramping on and off      Obstetric HPI:  Patient reports contractions on and off, decreased  fetal movement today, No vaginal bleeding , No loss of fluid     This pregnancy has been complicated by:  Obesity  Limited prenatal care  Seizure disorder, no seizures in > 5 years  Anxiety     OB History    Para Term  AB Living   4 3 3 0 0 3   SAB IAB Ectopic Multiple Live Births   0 0 0 0 3      # Outcome Date GA Lbr Paulino/2nd Weight Sex Delivery Anes PTL Lv   4 Current            3 Term 10/21/19 38w6d  3.02 kg (6 lb 10.5 oz) F Vag-Spont None N JOJO      Name: KIM WREN,GIRL PADMINI      Apgar1: 7  Apgar5: 9   2 Term 18 39w6d / 00:30 3.48 kg (7 lb 10.8 oz) M Vag-Spont Local N JOJO      Name: KIM WREN, BOY PADMINI      Apgar1: 8  Apgar5: 9   1 Term 16 39w0d  3.6 kg (7 lb 15 oz) M Vag-Spont None  JOJO      Birth Comments: [facility] at [facility]     Past Medical History:   Diagnosis Date    Anxiety     Arthritis     Asthma     has not used inhalers in years    JRA (juvenile rheumatoid arthritis)     Mild anemia 2023    Seizure     not on medications. has not had one since childhood    Seizures      Past Surgical History:   Procedure Laterality Date    ANKLE FRACTURE SURGERY      CHOLECYSTECTOMY      CYST REMOVAL      ROBOT-ASSISTED CHOLECYSTECTOMY USING DA NOE XI N/A 2023    Procedure: XI ROBOTIC CHOLECYSTECTOMY;  Surgeon: Nuzhat Henry DO;  Location: [facility];  Service: General;  Laterality: N/A;    tubes ears         PTA Medications   Medication Sig    aspirin (ECOTRIN) 81 MG EC tablet Take 1 tablet (81 mg total) by mouth once daily.       Review of  patient's allergies indicates:  No Known Allergies     Family History       Problem Relation (Age of Onset)    Asthma Mother, Brother, Brother    Inflammatory bowel disease Mother          Tobacco Use    Smoking status: Never    Smokeless tobacco: Never   Substance and Sexual Activity    Alcohol use: Not Currently    Drug use: No    Sexual activity: Yes     Partners: Male     Birth control/protection: None     Review of Systems   Gastrointestinal:  Positive for abdominal pain (cramping on and off).   Genitourinary:  Negative for dysuria, vaginal bleeding and vaginal discharge.   All other systems reviewed and are negative.   Objective:     Vital Signs (Most Recent):  Temp: 98.2 °F (36.8 °C) (07/16/23 2137)  Pulse: (!) 114 (07/16/23 2125)  BP: 108/74 (07/16/23 2125)  SpO2: 97 % (07/16/23 2125) Vital Signs (24h Range):  Temp:  [98.2 °F (36.8 °C)] 98.2 °F (36.8 °C)  Pulse:  [114] 114  SpO2:  [97 %] 97 %  BP: (108)/(74) 108/74     Weight: 106.6 kg (235 lb)  Body mass index is 42.98 kg/m².    FHT: Cat 1 (reassuring)  TOCO:  Irregular, irritability      Physical Exam:   Constitutional: She is oriented to person, place, and time. She appears well-developed and well-nourished.    HENT:   Head: Normocephalic and atraumatic.    Eyes: Conjunctivae and EOM are normal.     Cardiovascular:  Normal rate.             Pulmonary/Chest: Effort normal. No respiratory distress.        Abdominal: Soft. She exhibits no distension. There is no abdominal tenderness.     Genitourinary:    Vagina and uterus normal.             Musculoskeletal: Normal range of motion and moves all extremeties.       Neurological: She is alert and oriented to person, place, and time.    Skin: Skin is warm and dry.    Psychiatric: She has a normal mood and affect. Her behavior is normal. Judgment and thought content normal.      Cervix:  Dilation:  0  Effacement:  25%  Station: -3  Presentation: Vertex     Significant Labs:  Lab Results   Component Value  Date    GROUPTRH A POS 2023    HEPBSAG Non-reactive 2023    STREPBCULT  2018     STREPTOCOCCUS AGALACTIAE (GROUP B)  Beta-hemolytic streptococci are routinely susceptible to   penicillins,cephalosporins and carbapenems.         I have personallly reviewed all pertinent lab results from the last 24 hours.    Assessment/Plan:     28 y.o. female  at 35w1d for:    * Cramping affecting pregnancy, antepartum  OB triage  C/o cramping on and off. Admits to not drinking much water. No VB/LOF. Cervix closed. D/c to home with usual precautions & stressed hydration.     Mild anemia  10.2.32.8. Rx daily iron.     Limited prenatal care in third trimester  Patient states she has been in [place] for boyfriend's work. Also, has transportation issues.   3T labs with HA1C collected  U/S  GBS  Message sent to clinic staff to schedule OB visit and ultrasound in 1 week    Obesity affecting pregnancy in third trimester  Growth scan ordered: 42.9%tile, EFW 5lb 11oz. BPP . Will need BPP in 1 week per [facility] guidelines.         Pilar Brenner CNM  Obstetrics  [facility] - Labor & Delivery

## 2023-07-17 NOTE — ASSESSMENT & PLAN NOTE
Growth scan ordered: 42.9%tile, EFW 5lb 11oz. BPP 8/8. Will need BPP in 1 week per MFM guidelines.

## 2023-07-17 NOTE — ASSESSMENT & PLAN NOTE
Patient states she has been in [place] for boyfriend's work. Also, has transportation issues.   3T labs with HA1C collected  U/S  GBS  Message sent to clinic staff to schedule OB visit and ultrasound in 1 week

## 2023-07-17 NOTE — SUBJECTIVE & OBJECTIVE
Obstetric HPI:  Patient reports contractions on and off, decreased  fetal movement today, No vaginal bleeding , No loss of fluid     This pregnancy has been complicated by:  Obesity  Limited prenatal care  Seizure disorder, no seizures in > 5 years  Anxiety     OB History    Para Term  AB Living   4 3 3 0 0 3   SAB IAB Ectopic Multiple Live Births   0 0 0 0 3      # Outcome Date GA Lbr Paulino/2nd Weight Sex Delivery Anes PTL Lv   4 Current            3 Term 10/21/19 38w6d  3.02 kg (6 lb 10.5 oz) F Vag-Spont None N JOJO      Name: KIM MENDOZA,GIRL PHU      Apgar1: 7  Apgar5: 9   2 Term 18 39w6d / 00:30 3.48 kg (7 lb 10.8 oz) M Vag-Spont Local N JOJO      Name: KIM MENDOZA, ERASMO BAY      Apgar1: 8  Apgar5: 9   1 Term 16 39w0d  3.6 kg (7 lb 15 oz) M Vag-Spont None  JOJO      Birth Comments: [facility] at [facility]     Past Medical History:   Diagnosis Date    Anxiety     Arthritis     Asthma     has not used inhalers in years    JRA (juvenile rheumatoid arthritis)     Mild anemia 2023    Seizure     not on medications. has not had one since childhood    Seizures      Past Surgical History:   Procedure Laterality Date    ANKLE FRACTURE SURGERY      CHOLECYSTECTOMY      CYST REMOVAL      ROBOT-ASSISTED CHOLECYSTECTOMY USING DA NOE XI N/A 2023    Procedure: XI ROBOTIC CHOLECYSTECTOMY;  Surgeon: Nuzhat Henry DO;  Location: [facility];  Service: General;  Laterality: N/A;    tubes ears         PTA Medications   Medication Sig    aspirin (ECOTRIN) 81 MG EC tablet Take 1 tablet (81 mg total) by mouth once daily.       Review of patient's allergies indicates:  No Known Allergies     Family History       Problem Relation (Age of Onset)    Asthma Mother, Brother, Brother    Inflammatory bowel disease Mother          Tobacco Use    Smoking status: Never    Smokeless tobacco: Never   Substance and Sexual Activity    Alcohol use: Not Currently    Drug use: No    Sexual activity: Yes      Partners: Male     Birth control/protection: None     Review of Systems   Gastrointestinal:  Positive for abdominal pain (cramping on and off).   Genitourinary:  Negative for dysuria, vaginal bleeding and vaginal discharge.   All other systems reviewed and are negative.   Objective:     Vital Signs (Most Recent):  Temp: 98.2 °F (36.8 °C) (07/16/23 2137)  Pulse: (!) 114 (07/16/23 2125)  BP: 108/74 (07/16/23 2125)  SpO2: 97 % (07/16/23 2125) Vital Signs (24h Range):  Temp:  [98.2 °F (36.8 °C)] 98.2 °F (36.8 °C)  Pulse:  [114] 114  SpO2:  [97 %] 97 %  BP: (108)/(74) 108/74     Weight: 106.6 kg (235 lb)  Body mass index is 42.98 kg/m².    FHT: Cat 1 (reassuring)  TOCO:  Irregular, irritability      Physical Exam:   Constitutional: She is oriented to person, place, and time. She appears well-developed and well-nourished.    HENT:   Head: Normocephalic and atraumatic.    Eyes: Conjunctivae and EOM are normal.     Cardiovascular:  Normal rate.             Pulmonary/Chest: Effort normal. No respiratory distress.        Abdominal: Soft. She exhibits no distension. There is no abdominal tenderness.     Genitourinary:    Vagina and uterus normal.             Musculoskeletal: Normal range of motion and moves all extremeties.       Neurological: She is alert and oriented to person, place, and time.    Skin: Skin is warm and dry.    Psychiatric: She has a normal mood and affect. Her behavior is normal. Judgment and thought content normal.      Cervix:  Dilation:  0  Effacement:  25%  Station: -3  Presentation: Vertex     Significant Labs:  Lab Results   Component Value Date    GROUPTRH A POS 01/03/2023    HEPBSAG Non-reactive 01/03/2023    STREPBCULT  02/13/2018     STREPTOCOCCUS AGALACTIAE (GROUP B)  Beta-hemolytic streptococci are routinely susceptible to   penicillins,cephalosporins and carbapenems.         I have personallly reviewed all pertinent lab results from the last 24 hours.
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
## 2023-07-17 NOTE — PROCEDURES
"Padmini Wren is a 28 y.o. female patient.    Temp: 98.2 °F (36.8 °C) (23)  Pulse: (!) 114 (23)  BP: 108/74 (23)  SpO2: 97 % (23)  Weight: 106.6 kg (235 lb) (23)  Height: 5' 2" (157.5 cm) (23)       Obtain Fetal nonstress test (NST)    Date/Time: 2023 11:03 PM  Performed by: Pilar Brenner CNM  Authorized by: Pilar Brenner CNM     Nonstress Test:     Variability:  6-25 BPM    Decelerations:  None    Accelerations:  15 bpm    Acoustic Stimulator: No      Baseline:  145    Contractions:  Irregular  Biophysical Profile:     Fetal Breathin    Fetal Movement:  2    Fetal Tone:  2    Fluid Volume:  2    Nonstress Test:  2    Biophysical Profile Score  (of 8):  8    Biophysical Profile Score  (of 10):  10    Nonstress Test Interpretation: reactive      Overall Impression:  Reassuring  Post-procedure:     Patient tolerance:  Patient tolerated the procedure well with no immediate complications    2023    "

## 2023-07-17 NOTE — DISCHARGE INSTRUCTIONS

## 2023-07-17 NOTE — DISCHARGE SUMMARY
[facility] - Labor & Delivery  Obstetrics  Discharge Summary      Patient Name: Padmini Wren  MRN: [id number]  Admission Date: 2023  Hospital Length of Stay: 0 days  Discharge Date and Time:  2023 11:06 PM  Attending Physician: Kenzie Kyle MD   Discharging Provider: Pilar Brenner CNM   Primary Care Provider: Taryn Salcido MD    HPI:  35w1d c/o cramping on and off      FHT: Cat 1 (reassuring)  TOCO: Irregular, irritability     * No surgery found *     Hospital Course:   OB triage  C/o cramping on and off. Admits to not drinking much water. No VB/LOF. Cervix closed. D/c to home with usual precautions.           Final Active Diagnoses:    Diagnosis Date Noted POA    PRINCIPAL PROBLEM:  Cramping affecting pregnancy, antepartum [O26.899, R10.9] 2023 Yes    Limited prenatal care in third trimester [O09.33] 2023 Not Applicable    Mild anemia [D64.9] 2023 Yes    Obesity affecting pregnancy in third trimester [O99.213] 2023 Yes      Problems Resolved During this Admission:        Significant Diagnostic Studies: Labs:   CBC   Recent Labs   Lab 23   WBC 15.31*   HGB 10.2*   HCT 32.8*            Immunizations     None          This patient has no babies on file.  Pending Diagnostic Studies:     Procedure Component Value Units Date/Time    Drug screen panel, emergency [[id number]] Collected: 23    Order Status: Sent Lab Status: In process Updated: 23    Specimen: Urine, Clean Catch     Hemoglobin A1c [[id number]] Collected: 23    Order Status: Sent Lab Status: In process Updated: 23    Specimen: Blood     RPR [[id number]] Collected: 23    Order Status: Sent Lab Status: In process Updated: 23    Specimen: Blood     Urinalysis [[id number]] Collected: 23    Order Status: Sent Lab Status: In process Updated: 23    Specimen: Urine           Discharged Condition:  good    Disposition: Home or Self Care    Follow Up:   Follow-up Information     [facility], [facility] Follow up in 1 week(s).                     Patient Instructions:   No discharge procedures on file.  Medications:  Current Discharge Medication List      START taking these medications    Details   ferrous sulfate 325 (65 FE) MG EC tablet Take 1 tablet (325 mg total) by mouth once daily.  Qty: 60 tablet, Refills: 1         CONTINUE these medications which have NOT CHANGED    Details   aspirin (ECOTRIN) 81 MG EC tablet Take 1 tablet (81 mg total) by mouth once daily.  Refills: 0             Pilar Brenner CNM  Obstetrics  [facility] - Labor [facility] Delivery

## 2023-07-18 LAB
BACTERIA UR CULT: ABNORMAL
BACTERIA UR CULT: ABNORMAL

## 2023-07-19 ENCOUNTER — TELEPHONE (OUTPATIENT)
Dept: OBSTETRICS AND GYNECOLOGY | Facility: CLINIC | Age: 28
End: 2023-07-19
Payer: MEDICAID

## 2023-07-19 DIAGNOSIS — Z36.89 ENCOUNTER FOR ULTRASOUND TO ASSESS FETAL GROWTH: Primary | ICD-10-CM

## 2023-07-19 DIAGNOSIS — O23.43 URINARY TRACT INFECTION IN MOTHER DURING THIRD TRIMESTER OF PREGNANCY: Primary | ICD-10-CM

## 2023-07-19 PROBLEM — N30.00 ACUTE CYSTITIS WITHOUT HEMATURIA: Status: ACTIVE | Noted: 2023-07-19

## 2023-07-19 LAB — BACTERIA SPEC AEROBE CULT: NORMAL

## 2023-07-19 RX ORDER — AMOXICILLIN AND CLAVULANATE POTASSIUM 875; 125 MG/1; MG/1
1 TABLET, FILM COATED ORAL EVERY 12 HOURS
Qty: 14 TABLET | Refills: 0 | Status: SHIPPED | OUTPATIENT
Start: 2023-07-19 | End: 2023-08-04 | Stop reason: ALTCHOICE

## 2023-07-27 ENCOUNTER — ROUTINE PRENATAL (OUTPATIENT)
Dept: OBSTETRICS AND GYNECOLOGY | Facility: CLINIC | Age: 28
End: 2023-07-27
Payer: MEDICAID

## 2023-07-27 ENCOUNTER — PROCEDURE VISIT (OUTPATIENT)
Dept: OBSTETRICS AND GYNECOLOGY | Facility: CLINIC | Age: 28
End: 2023-07-27
Payer: MEDICAID

## 2023-07-27 VITALS — BODY MASS INDEX: 46.9 KG/M2 | WEIGHT: 256.38 LBS | DIASTOLIC BLOOD PRESSURE: 68 MMHG | SYSTOLIC BLOOD PRESSURE: 124 MMHG

## 2023-07-27 DIAGNOSIS — O99.352 SEIZURE DISORDER DURING PREGNANCY IN SECOND TRIMESTER: ICD-10-CM

## 2023-07-27 DIAGNOSIS — O99.013 ANEMIA AFFECTING PREGNANCY IN THIRD TRIMESTER: ICD-10-CM

## 2023-07-27 DIAGNOSIS — Z36.89 ENCOUNTER FOR ULTRASOUND TO ASSESS FETAL GROWTH: ICD-10-CM

## 2023-07-27 DIAGNOSIS — O99.213 OBESITY AFFECTING PREGNANCY IN THIRD TRIMESTER: Primary | ICD-10-CM

## 2023-07-27 DIAGNOSIS — G40.909 SEIZURE DISORDER DURING PREGNANCY IN SECOND TRIMESTER: ICD-10-CM

## 2023-07-27 PROCEDURE — 99999 PR PBB SHADOW E&M-EST. PATIENT-LVL II: ICD-10-PCS | Mod: PBBFAC,,, | Performed by: ADVANCED PRACTICE MIDWIFE

## 2023-07-27 PROCEDURE — 76819 US OB/GYN PROCEDURE (VIEWPOINT): ICD-10-PCS | Mod: 26,S$PBB,, | Performed by: OBSTETRICS & GYNECOLOGY

## 2023-07-27 PROCEDURE — 76819 FETAL BIOPHYS PROFIL W/O NST: CPT | Mod: 26,S$PBB,, | Performed by: OBSTETRICS & GYNECOLOGY

## 2023-07-27 PROCEDURE — 99214 PR OFFICE/OUTPT VISIT, EST, LEVL IV, 30-39 MIN: ICD-10-PCS | Mod: TH,S$PBB,, | Performed by: ADVANCED PRACTICE MIDWIFE

## 2023-07-27 PROCEDURE — 99214 OFFICE O/P EST MOD 30 MIN: CPT | Mod: TH,S$PBB,, | Performed by: ADVANCED PRACTICE MIDWIFE

## 2023-07-27 PROCEDURE — 99999 PR PBB SHADOW E&M-EST. PATIENT-LVL II: CPT | Mod: PBBFAC,,, | Performed by: ADVANCED PRACTICE MIDWIFE

## 2023-07-27 PROCEDURE — 76816 US OB/GYN PROCEDURE (VIEWPOINT): ICD-10-PCS | Mod: 26,S$PBB,, | Performed by: OBSTETRICS & GYNECOLOGY

## 2023-07-27 PROCEDURE — 76816 OB US FOLLOW-UP PER FETUS: CPT | Mod: PBBFAC,PO | Performed by: OBSTETRICS & GYNECOLOGY

## 2023-07-27 PROCEDURE — 99212 OFFICE O/P EST SF 10 MIN: CPT | Mod: PBBFAC,25,TH,PO | Performed by: ADVANCED PRACTICE MIDWIFE

## 2023-07-28 PROBLEM — O99.013 ANEMIA AFFECTING PREGNANCY IN THIRD TRIMESTER: Status: ACTIVE | Noted: 2023-07-16

## 2023-07-28 NOTE — PROGRESS NOTES
28 y.o. female  at 36w5d  Reports + FM, denies VB, LOF or regular CTX  Doing well without concerns, had L&D visit, feeling better, has had lapse in care  /68   Wt 116.3 kg (256 lb 6.3 oz)   LMP  (LMP Unknown) Comment: 7 weeks pregnant  BMI 46.90 kg/m²   TW lbs   GBS collected today   The skin of the suprapubic region was evaluated and appears clean, dry and intact.    US today with cephalic presentation, posterior placenta, KRUPA WNL, MVP 5.0cm, EFW 34%, 6lb 6oz, BPP 8/8, AC 58%, reviewed with pt     Obesity affecting pregnancy in third trimester    Seizure disorder during pregnancy in second trimester    Anemia affecting pregnancy in third trimester    Reviewed warning signs, normal FKCs, labor precautions and how/when to call.  RTC x 1 wk, call or present sooner prn.

## 2023-08-03 ENCOUNTER — ROUTINE PRENATAL (OUTPATIENT)
Dept: OBSTETRICS AND GYNECOLOGY | Facility: CLINIC | Age: 28
End: 2023-08-03

## 2023-08-03 VITALS
DIASTOLIC BLOOD PRESSURE: 78 MMHG | WEIGHT: 254.19 LBS | BODY MASS INDEX: 46.49 KG/M2 | SYSTOLIC BLOOD PRESSURE: 132 MMHG

## 2023-08-03 DIAGNOSIS — R42 DIZZINESS: ICD-10-CM

## 2023-08-03 DIAGNOSIS — O99.213 OBESITY AFFECTING PREGNANCY IN THIRD TRIMESTER: Primary | ICD-10-CM

## 2023-08-03 LAB
CREAT UR-MCNC: 239 MG/DL (ref 15–325)
PROT UR-MCNC: 29 MG/DL (ref 0–15)
PROT/CREAT UR: 0.12 MG/G{CREAT} (ref 0–0.2)

## 2023-08-03 PROCEDURE — 99212 OFFICE O/P EST SF 10 MIN: CPT | Mod: PBBFAC,PO | Performed by: ADVANCED PRACTICE MIDWIFE

## 2023-08-03 PROCEDURE — 84156 ASSAY OF PROTEIN URINE: CPT | Performed by: ADVANCED PRACTICE MIDWIFE

## 2023-08-03 PROCEDURE — 0502F SUBSEQUENT PRENATAL CARE: CPT | Mod: ,,, | Performed by: ADVANCED PRACTICE MIDWIFE

## 2023-08-03 PROCEDURE — 99999 PR PBB SHADOW E&M-EST. PATIENT-LVL II: CPT | Mod: PBBFAC,,, | Performed by: ADVANCED PRACTICE MIDWIFE

## 2023-08-03 PROCEDURE — 99999 PR PBB SHADOW E&M-EST. PATIENT-LVL II: ICD-10-PCS | Mod: PBBFAC,,, | Performed by: ADVANCED PRACTICE MIDWIFE

## 2023-08-03 PROCEDURE — 0502F PR SUBSEQUENT PRENATAL CARE: ICD-10-PCS | Mod: ,,, | Performed by: ADVANCED PRACTICE MIDWIFE

## 2023-08-03 NOTE — PROGRESS NOTES
28 y.o. female  at 37w5d   Reports + FM, denies VB, LOF or regular CTX  Doing ok, reports dizziness, BLE, and elevated blood pressures at home, will do PIH workup today   /78   Wt 115.3 kg (254 lb 3.1 oz)   LMP  (LMP Unknown) Comment: 7 weeks pregnant  BMI 46.49 kg/m²   TW lbs     Obesity affecting pregnancy in third trimester  -     US OB/GYN Procedure ([facility])-Future; Standing    Dizziness  -     CBC W/ AUTO DIFFERENTIAL; Future; Expected date: 2023  -     Comprehensive Metabolic Panel; Future; Expected date: 2023  -     Protein / creatinine ratio, urine    BPP ordered for nv  VE per pt request  Reviewed warning signs, normal FKCs, labor precautions and how/when to call.  RTC x 1 wk, call or present sooner prn.

## 2023-08-09 ENCOUNTER — PATIENT MESSAGE (OUTPATIENT)
Dept: OBSTETRICS AND GYNECOLOGY | Facility: CLINIC | Age: 28
End: 2023-08-09

## 2023-10-20 ENCOUNTER — APPOINTMENT (OUTPATIENT)
Facility: HOSPITAL | Age: 28
End: 2023-10-20
Payer: COMMERCIAL

## 2023-10-20 ENCOUNTER — HOSPITAL ENCOUNTER (EMERGENCY)
Facility: HOSPITAL | Age: 28
Discharge: HOME | End: 2023-10-20
Attending: EMERGENCY MEDICINE
Payer: COMMERCIAL

## 2023-10-20 VITALS
RESPIRATION RATE: 18 BRPM | WEIGHT: 250 LBS | SYSTOLIC BLOOD PRESSURE: 102 MMHG | OXYGEN SATURATION: 96 % | HEIGHT: 62 IN | BODY MASS INDEX: 46.01 KG/M2 | HEART RATE: 114 BPM | DIASTOLIC BLOOD PRESSURE: 66 MMHG | TEMPERATURE: 98 F
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
## 2023-10-20 DIAGNOSIS — S09.90XA INJURY OF HEAD, INITIAL ENCOUNTER: ICD-10-CM

## 2023-10-20 DIAGNOSIS — S00.81XA ABRASION OF FACE, INITIAL ENCOUNTER: ICD-10-CM

## 2023-10-20 DIAGNOSIS — S29.019A THORACIC MYOFASCIAL STRAIN, INITIAL ENCOUNTER: ICD-10-CM

## 2023-10-20 LAB
ALBUMIN SERPL-MCNC: 4 G/DL (ref 3.5–5.2)
ALBUMIN/GLOB SERPL: 1.2 (ref 1.1–2.2)
ALP SERPL-CCNC: 90 U/L (ref 35–104)
ALT SERPL-CCNC: 19 U/L (ref 10–35)
ANION GAP SERPL CALC-SCNC: 10 MMOL/L (ref 5–15)
APPEARANCE UR: ABNORMAL
AST SERPL-CCNC: 18 U/L (ref 10–35)
BACTERIA URNS QL MICRO: ABNORMAL /HPF
BASOPHILS # BLD: 0 K/UL (ref 0–1)
BASOPHILS NFR BLD: 0 % (ref 0–1)
BILIRUB SERPL-MCNC: 0.2 MG/DL (ref 0.2–1)
BILIRUB UR QL: NEGATIVE
BUN SERPL-MCNC: 13 MG/DL (ref 6–20)
BUN/CREAT SERPL: 20 (ref 12–20)
CALCIUM SERPL-MCNC: 9.3 MG/DL (ref 8.6–10)
CHLORIDE SERPL-SCNC: 108 MMOL/L (ref 98–107)
CO2 SERPL-SCNC: 28 MMOL/L (ref 22–29)
COLOR UR: ABNORMAL
COMMENT:: NORMAL
CREAT SERPL-MCNC: 0.66 MG/DL (ref 0.5–0.9)
DIFFERENTIAL METHOD BLD: ABNORMAL
EOSINOPHIL # BLD: 0.4 K/UL (ref 0–0.4)
EOSINOPHIL NFR BLD: 3 %
EPITH CASTS URNS QL MICRO: ABNORMAL /LPF
ERYTHROCYTE [DISTWIDTH] IN BLOOD BY AUTOMATED COUNT: 16.3 % (ref 11.5–14.5)
GLOBULIN SER CALC-MCNC: 3.3 G/DL (ref 2–4)
GLUCOSE SERPL-MCNC: 87 MG/DL (ref 65–100)
GLUCOSE UR STRIP.AUTO-MCNC: NEGATIVE MG/DL
HCG UR QL: NEGATIVE
HCT VFR BLD AUTO: 42.6 % (ref 35–47)
HGB BLD-MCNC: 13.2 G/DL (ref 11.5–16)
HGB UR QL STRIP: ABNORMAL
IMM GRANULOCYTES # BLD AUTO: 0.1 K/UL (ref 0–0.04)
IMM GRANULOCYTES NFR BLD AUTO: 0 % (ref 0–0.5)
KETONES UR QL STRIP.AUTO: NEGATIVE MG/DL
LEUKOCYTE ESTERASE UR QL STRIP.AUTO: ABNORMAL
LYMPHOCYTES # BLD: 3.7 K/UL (ref 0.8–3.5)
LYMPHOCYTES NFR BLD: 25 % (ref 12–49)
MCH RBC QN AUTO: 28.1 PG (ref 26–34)
MCHC RBC AUTO-ENTMCNC: 31 G/DL (ref 30–36.5)
MCV RBC AUTO: 90.8 FL (ref 80–99)
MONOCYTES # BLD: 0.9 K/UL (ref 0–1)
MONOCYTES NFR BLD: 6 % (ref 5–13)
NEUTS SEG # BLD: 9.4 K/UL (ref 1.8–8)
NEUTS SEG NFR BLD: 66 % (ref 32–75)
NITRITE UR QL STRIP.AUTO: NEGATIVE
NRBC # BLD: 0 K/UL (ref 0–0.01)
NRBC BLD-RTO: 0 PER 100 WBC
PH UR STRIP: 6 (ref 5–8)
PLATELET # BLD AUTO: 370 K/UL (ref 150–400)
PMV BLD AUTO: 9.8 FL (ref 8.9–12.9)
POTASSIUM SERPL-SCNC: 4.4 MMOL/L (ref 3.5–5.1)
PROT SERPL-MCNC: 7.3 G/DL (ref 6.4–8.3)
PROT UR STRIP-MCNC: NEGATIVE MG/DL
RBC # BLD AUTO: 4.69 M/UL (ref 3.8–5.2)
RBC #/AREA URNS HPF: ABNORMAL /HPF
SODIUM SERPL-SCNC: 146 MMOL/L (ref 136–145)
SP GR UR REFRACTOMETRY: 1.02 (ref 1–1.03)
SPECIMEN HOLD: NORMAL
UROBILINOGEN UR QL STRIP.AUTO: 1 EU/DL (ref 0.2–1)
WBC # BLD AUTO: 14.4 K/UL (ref 3.6–11)
WBC URNS QL MICRO: ABNORMAL /HPF (ref 0–4)

## 2023-10-20 PROCEDURE — 99284 EMERGENCY DEPT VISIT MOD MDM: CPT

## 2023-10-20 PROCEDURE — 36415 COLL VENOUS BLD VENIPUNCTURE: CPT

## 2023-10-20 PROCEDURE — 70450 CT HEAD/BRAIN W/O DYE: CPT

## 2023-10-20 PROCEDURE — 81001 URINALYSIS AUTO W/SCOPE: CPT

## 2023-10-20 PROCEDURE — 72125 CT NECK SPINE W/O DYE: CPT

## 2023-10-20 PROCEDURE — 81025 URINE PREGNANCY TEST: CPT

## 2023-10-20 PROCEDURE — 85025 COMPLETE CBC W/AUTO DIFF WBC: CPT

## 2023-10-20 PROCEDURE — 71260 CT THORAX DX C+: CPT

## 2023-10-20 PROCEDURE — 80053 COMPREHEN METABOLIC PANEL: CPT

## 2023-10-20 ASSESSMENT — ENCOUNTER SYMPTOMS
COUGH: 0
SORE THROAT: 0
BACK PAIN: 0
DIARRHEA: 0
FACIAL SWELLING: 0
VOMITING: 0
SHORTNESS OF BREATH: 0
NAUSEA: 0
CHEST TIGHTNESS: 0
EYE DISCHARGE: 0
EYE PAIN: 0
ABDOMINAL PAIN: 0

## 2023-10-20 ASSESSMENT — LIFESTYLE VARIABLES
HOW MANY STANDARD DRINKS CONTAINING ALCOHOL DO YOU HAVE ON A TYPICAL DAY: PATIENT DOES NOT DRINK
HOW OFTEN DO YOU HAVE A DRINK CONTAINING ALCOHOL: NEVER

## 2023-10-20 ASSESSMENT — PAIN - FUNCTIONAL ASSESSMENT: PAIN_FUNCTIONAL_ASSESSMENT: 0-10

## 2023-10-20 ASSESSMENT — PAIN DESCRIPTION - LOCATION: LOCATION: HEAD

## 2023-10-20 ASSESSMENT — PAIN SCALES - GENERAL: PAINLEVEL_OUTOF10: 6
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
## 2023-10-22 LAB
EKG ATRIAL RATE: 82 BPM
EKG DIAGNOSIS: NORMAL
EKG P AXIS: 11 DEGREES
EKG P-R INTERVAL: 130 MS
EKG Q-T INTERVAL: 364 MS
EKG QRS DURATION: 78 MS
EKG QTC CALCULATION (BAZETT): 425 MS
EKG R AXIS: 42 DEGREES
EKG T AXIS: 41 DEGREES
EKG VENTRICULAR RATE: 82 BPM

## 2023-12-21 NOTE — PROGRESS NOTES
27 y.o. female  at 11w3d by  US here for NOB exam  Doing well without concerns. Mild nausea. Does not want meds. Suggestions for Life style modifications provided.    /72   Wt 107 kg (235 lb 14.3 oz)   LMP  (LMP Unknown) Comment: 7 weeks pregnant  BMI 43.15 kg/m²    US: single viable intrauterine pregnancy. Cardiac activity identified. No fluid in culdesac. Normal uterus, adnexae and cervix.   Reviewed warning signs, normal FM/FKCs, pregnancy precautions and how/when to call.  RTC x 4 wks, call or present sooner prn.     I spent 20 min with this patient. This includes face to face time and non-face to face time preparing to see the patient (eg, review of tests), obtaining and/or reviewing separately obtained history, documenting clinical information in the electronic or other health record, independently interpreting results and communicating results to the patient/family/caregiver, or care coordinator.    PRATIBHA ZHONG       
aaox3 nad  rrr s1s2  ctab  soft abdomen  no LE edema  nonfocal neuro  ambulatory  tolerating diet  feels better post IV of methylene blue  ros negative

## 2025-05-30 NOTE — ASSESSMENT & PLAN NOTE
1/6/23:  POD#1 s/p LAVELLE larry  -doing well  -manage per general surgery  -avoid NSAID's for pain; okay for short term use of narcotics as needed for post-op pain   2

## (undated) DEVICE — DRAPE THREE-QTR REINF 53X77IN

## (undated) DEVICE — SOL NS 1000CC

## (undated) DEVICE — BAG TISSUE RETRIEVAL 5MM

## (undated) DEVICE — DRAPE COLUMN DAVINCI XI

## (undated) DEVICE — CLIP HEMO-LOK ML

## (undated) DEVICE — TIP GRASPER FENESTRATED DISP

## (undated) DEVICE — ADHESIVE DERMABOND ADVANCED

## (undated) DEVICE — ELECTRODE REM PLYHSV RETURN 9

## (undated) DEVICE — SUT CTD VICRYL 0 UND BR SUT

## (undated) DEVICE — APPLICATOR CHLORAPREP ORN 26ML

## (undated) DEVICE — SUPPORT ULNA NERVE PROTECTOR

## (undated) DEVICE — OBTURATOR BLADELESS 8MM XI CLR

## (undated) DEVICE — CLIP HEMO-LOK MLX LARGE LF

## (undated) DEVICE — SUT MONOCRYL 4.0 PS2 CP496G

## (undated) DEVICE — DRAPE ARM DAVINCI XI

## (undated) DEVICE — TAPE SILK 3IN

## (undated) DEVICE — TOWEL OR DISP STRL BLUE 4/PK

## (undated) DEVICE — NDL SAFETY 22G X 1.5 ECLIPSE

## (undated) DEVICE — GLOVE SURGICAL LATEX SZ 6.5

## (undated) DEVICE — PACK BASIC SETUP SC BR

## (undated) DEVICE — SET PNEUMOCLEAR HEAT HUM SE HF

## (undated) DEVICE — MANIFOLD 4 PORT

## (undated) DEVICE — KIT ANTIFOG W/SPONG & FLUID

## (undated) DEVICE — SOL STRL WATER INJ 1000ML BG

## (undated) DEVICE — GOWN POLY REINF BRTH SLV XL

## (undated) DEVICE — DRAPE ABDOMINAL TIBURON 14X11

## (undated) DEVICE — SUT CTD VICRYL VIL BR UR-6

## (undated) DEVICE — SYR 3CC LUER LOC

## (undated) DEVICE — SYR 10CC LUER LOCK

## (undated) DEVICE — COVER LIGHT HANDLE 80/CA

## (undated) DEVICE — UNDERGLOVE BIOGEL PI SZ 6.5 LF

## (undated) DEVICE — TOWEL OR NONABSORB ADH 17X26

## (undated) DEVICE — HEADREST ROUND DISP FOAM 9IN

## (undated) DEVICE — COVER TIP CURVED SCISSORS XI